# Patient Record
Sex: FEMALE | Race: WHITE | Employment: OTHER | URBAN - NONMETROPOLITAN AREA
[De-identification: names, ages, dates, MRNs, and addresses within clinical notes are randomized per-mention and may not be internally consistent; named-entity substitution may affect disease eponyms.]

---

## 2020-10-17 ENCOUNTER — HOSPITAL ENCOUNTER (OUTPATIENT)
Dept: LAB | Age: 85
Discharge: HOME | End: 2020-10-17
Payer: MEDICARE

## 2020-10-17 ENCOUNTER — TRANSCRIBE ORDER (OUTPATIENT)
Dept: REGISTRATION | Age: 85
End: 2020-10-17

## 2020-10-17 DIAGNOSIS — E53.8 BIOTIN-(PROPIONYL-COA-CARBOXYLASE) LIGASE DEFICIENCY: ICD-10-CM

## 2020-10-17 DIAGNOSIS — E03.9 ACQUIRED HYPOTHYROIDISM: ICD-10-CM

## 2020-10-17 DIAGNOSIS — D50.9 IRON DEFICIENCY ANEMIA, UNSPECIFIED: ICD-10-CM

## 2020-10-17 DIAGNOSIS — I10 ESSENTIAL HYPERTENSION, MALIGNANT: Primary | ICD-10-CM

## 2020-10-17 DIAGNOSIS — I10 ESSENTIAL HYPERTENSION, MALIGNANT: ICD-10-CM

## 2020-10-17 DIAGNOSIS — E55.9 VITAMIN D DEFICIENCY DISEASE: ICD-10-CM

## 2020-10-17 DIAGNOSIS — D64.9 ANEMIA, UNSPECIFIED: ICD-10-CM

## 2020-10-17 DIAGNOSIS — E78.00 PURE HYPERCHOLESTEROLEMIA: ICD-10-CM

## 2020-10-17 DIAGNOSIS — D52.9 FOLATE-DEFICIENCY ANEMIA: ICD-10-CM

## 2020-10-17 LAB
ALBUMIN SERPL-MCNC: 4.1 G/DL (ref 3.5–4.7)
ALBUMIN/GLOB SERPL: 1.3 {RATIO}
ALP SERPL-CCNC: 60 U/L (ref 38–126)
ALT SERPL-CCNC: 11 U/L (ref 3–52)
ANION GAP SERPL CALC-SCNC: 11 MMOL/L
AST SERPL W P-5'-P-CCNC: 17 U/L (ref 14–74)
BASOPHILS # BLD: 0 K/UL (ref 0–0.1)
BASOPHILS NFR BLD: 0 % (ref 0–2)
BILIRUB SERPL-MCNC: 0.8 MG/DL (ref 0.2–1)
BUN SERPL-MCNC: 15 MG/DL (ref 9–21)
BUN/CREAT SERPL: 17
CA-I BLD-MCNC: 9.7 MG/DL (ref 8.5–10.5)
CHLORIDE SERPL-SCNC: 96 MMOL/L (ref 94–111)
CO2 SERPL-SCNC: 27 MMOL/L (ref 21–33)
CREAT SERPL-MCNC: 0.9 MG/DL (ref 0.7–1.2)
EOSINOPHIL # BLD: 0.1 K/UL (ref 0–0.4)
EOSINOPHIL NFR BLD: 1 % (ref 0–5)
ERYTHROCYTE [DISTWIDTH] IN BLOOD BY AUTOMATED COUNT: 14.3 % (ref 11.6–14.5)
GLOBULIN SER CALC-MCNC: 3.1 G/DL
GLUCOSE SERPL-MCNC: 110 MG/DL (ref 70–110)
HCT VFR BLD AUTO: 42.2 % (ref 35–45)
HGB BLD-MCNC: 13.9 G/DL (ref 12–16)
IMM GRANULOCYTES # BLD AUTO: 0 K/UL
IMM GRANULOCYTES NFR BLD AUTO: 0 %
LYMPHOCYTES # BLD: 1.3 K/UL (ref 0.9–3.6)
LYMPHOCYTES NFR BLD: 18 % (ref 21–52)
MCH RBC QN AUTO: 28.6 PG (ref 24–34)
MCHC RBC AUTO-ENTMCNC: 32.9 G/DL (ref 31–37)
MCV RBC AUTO: 86.8 FL (ref 74–97)
MONOCYTES # BLD: 0.5 K/UL (ref 0.05–1.2)
MONOCYTES NFR BLD: 7 % (ref 3–10)
NEUTS SEG # BLD: 5.1 K/UL (ref 1.8–8)
NEUTS SEG NFR BLD: 74 % (ref 40–73)
PLATELET # BLD AUTO: 245 K/UL (ref 135–420)
PMV BLD AUTO: 10.5 FL (ref 9.2–11.8)
POTASSIUM SERPL-SCNC: 4.3 MMOL/L (ref 3.2–5.1)
PROT SERPL-MCNC: 7.2 G/DL (ref 6.1–8.4)
RBC # BLD AUTO: 4.86 M/UL (ref 4.2–5.3)
SODIUM SERPL-SCNC: 134 MMOL/L (ref 135–145)
TSH SERPL DL<=0.05 MIU/L-ACNC: 2.79 UIU/ML (ref 0.35–6.2)
WBC # BLD AUTO: 6.9 K/UL (ref 4.6–13.2)

## 2020-10-17 PROCEDURE — 82306 VITAMIN D 25 HYDROXY: CPT

## 2020-10-17 PROCEDURE — 36415 COLL VENOUS BLD VENIPUNCTURE: CPT

## 2020-10-17 PROCEDURE — 80053 COMPREHEN METABOLIC PANEL: CPT

## 2020-10-17 PROCEDURE — 82728 ASSAY OF FERRITIN: CPT

## 2020-10-17 PROCEDURE — 85025 COMPLETE CBC W/AUTO DIFF WBC: CPT

## 2020-10-17 PROCEDURE — 80061 LIPID PANEL: CPT

## 2020-10-17 PROCEDURE — 82607 VITAMIN B-12: CPT

## 2020-10-17 PROCEDURE — 84439 ASSAY OF FREE THYROXINE: CPT

## 2020-10-17 PROCEDURE — 84443 ASSAY THYROID STIM HORMONE: CPT

## 2020-10-18 LAB
25(OH)D3 SERPL-MCNC: 43.9 NG/ML (ref 30–100)
CHOLEST SERPL-MCNC: 142 MG/DL
FERRITIN SERPL-MCNC: 94 NG/ML (ref 8–252)
HDLC SERPL-MCNC: 66 MG/DL
HDLC SERPL: 2.2 {RATIO} (ref 0–5)
LDLC SERPL CALC-MCNC: 56.6 MG/DL (ref 0–100)
LIPID PROFILE,FLP: NORMAL
T4 FREE SERPL-MCNC: 1 NG/DL (ref 0.8–1.5)
TRIGL SERPL-MCNC: 97 MG/DL (ref ?–150)
VIT B12 SERPL-MCNC: >2000 PG/ML (ref 193–986)
VLDLC SERPL CALC-MCNC: 19.4 MG/DL

## 2021-04-06 ENCOUNTER — TRANSCRIBE ORDER (OUTPATIENT)
Dept: REGISTRATION | Age: 86
End: 2021-04-06

## 2021-04-06 ENCOUNTER — HOSPITAL ENCOUNTER (OUTPATIENT)
Dept: LAB | Age: 86
Discharge: HOME | End: 2021-04-06
Payer: MEDICARE

## 2021-04-06 DIAGNOSIS — D50.9 NORMOCYTIC HYPOCHROMIC ANEMIA: ICD-10-CM

## 2021-04-06 DIAGNOSIS — D52.9 DIHYDROFOLATE REDUCTASE DEFICIENCY: ICD-10-CM

## 2021-04-06 DIAGNOSIS — E78.00 HYPERCHOLESTERINEMIC XANTHOMATOSIS: ICD-10-CM

## 2021-04-06 DIAGNOSIS — D64.9 ABSOLUTE ANEMIA: ICD-10-CM

## 2021-04-06 DIAGNOSIS — N39.0 URINARY TRACT INFECTIOUS DISEASE: ICD-10-CM

## 2021-04-06 DIAGNOSIS — E03.9 ACQUIRED HYPOTHYROIDISM: ICD-10-CM

## 2021-04-06 DIAGNOSIS — I10 ESSENTIAL HYPERTENSION, BENIGN: Primary | ICD-10-CM

## 2021-04-06 DIAGNOSIS — I10 ESSENTIAL HYPERTENSION, BENIGN: ICD-10-CM

## 2021-04-06 LAB
ALBUMIN SERPL-MCNC: 4 G/DL (ref 3.5–4.7)
ALBUMIN/GLOB SERPL: 1.4 {RATIO}
ALP SERPL-CCNC: 57 U/L (ref 38–126)
ALT SERPL-CCNC: 18 U/L (ref 3–52)
ANION GAP SERPL CALC-SCNC: 9 MMOL/L
APPEARANCE UR: ABNORMAL
AST SERPL W P-5'-P-CCNC: 23 U/L (ref 14–74)
BACTERIA URNS QL MICRO: SLIGHT /HPF
BASOPHILS # BLD: 0 K/UL (ref 0–0.1)
BASOPHILS NFR BLD: 0 % (ref 0–2)
BILIRUB SERPL-MCNC: 1 MG/DL (ref 0.2–1)
BILIRUB UR QL: NEGATIVE
BUN SERPL-MCNC: 24 MG/DL (ref 9–21)
BUN/CREAT SERPL: 30
CA-I BLD-MCNC: 9.2 MG/DL (ref 8.5–10.5)
CHLORIDE SERPL-SCNC: 102 MMOL/L (ref 94–111)
CO2 SERPL-SCNC: 28 MMOL/L (ref 21–33)
COLOR UR: ABNORMAL
CREAT SERPL-MCNC: 0.8 MG/DL (ref 0.7–1.2)
EOSINOPHIL # BLD: 0.1 K/UL (ref 0–0.4)
EOSINOPHIL NFR BLD: 2 % (ref 0–5)
EPITH CASTS URNS QL MICRO: ABNORMAL /LPF (ref 0–20)
ERYTHROCYTE [DISTWIDTH] IN BLOOD BY AUTOMATED COUNT: 15.1 % (ref 11.6–14.5)
GLOBULIN SER CALC-MCNC: 2.8 G/DL
GLUCOSE SERPL-MCNC: 100 MG/DL (ref 70–110)
GLUCOSE UR STRIP.AUTO-MCNC: NEGATIVE MG/DL
HCT VFR BLD AUTO: 40.2 % (ref 35–45)
HGB BLD-MCNC: 12.5 G/DL (ref 12–16)
HGB UR QL STRIP: ABNORMAL
IMM GRANULOCYTES # BLD AUTO: 0 K/UL
IMM GRANULOCYTES NFR BLD AUTO: 0 %
KETONES UR QL STRIP.AUTO: NEGATIVE MG/DL
LEUKOCYTE ESTERASE UR QL STRIP.AUTO: ABNORMAL
LYMPHOCYTES # BLD: 1.4 K/UL (ref 0.9–3.6)
LYMPHOCYTES NFR BLD: 24 % (ref 21–52)
MCH RBC QN AUTO: 28.2 PG (ref 24–34)
MCHC RBC AUTO-ENTMCNC: 31.1 G/DL (ref 31–37)
MCV RBC AUTO: 90.7 FL (ref 74–97)
MONOCYTES # BLD: 0.4 K/UL (ref 0.05–1.2)
MONOCYTES NFR BLD: 7 % (ref 3–10)
NEUTS SEG # BLD: 3.9 K/UL (ref 1.8–8)
NEUTS SEG NFR BLD: 67 % (ref 40–73)
NITRITE UR QL STRIP.AUTO: NEGATIVE
PH UR STRIP: 5 [PH] (ref 5–9)
PLATELET # BLD AUTO: 185 K/UL (ref 135–420)
PMV BLD AUTO: 10.7 FL (ref 9.2–11.8)
POTASSIUM SERPL-SCNC: 3.7 MMOL/L (ref 3.2–5.1)
PROT SERPL-MCNC: 6.8 G/DL (ref 6.1–8.4)
PROT UR STRIP-MCNC: 15 MG/DL
RBC # BLD AUTO: 4.43 M/UL (ref 4.2–5.3)
RBC #/AREA URNS HPF: ABNORMAL /HPF (ref 0–2)
SODIUM SERPL-SCNC: 139 MMOL/L (ref 135–145)
SP GR UR REFRACTOMETRY: 1.01 (ref 1–1.03)
TSH SERPL DL<=0.05 MIU/L-ACNC: 2.29 UIU/ML (ref 0.35–6.2)
UROBILINOGEN UR QL STRIP.AUTO: 0.2 EU/DL (ref 0.2–1)
WBC # BLD AUTO: 5.8 K/UL (ref 4.6–13.2)
WBC URNS QL MICRO: >100 /HPF (ref 0–4)

## 2021-04-06 PROCEDURE — 84443 ASSAY THYROID STIM HORMONE: CPT

## 2021-04-06 PROCEDURE — 87077 CULTURE AEROBIC IDENTIFY: CPT

## 2021-04-06 PROCEDURE — 82728 ASSAY OF FERRITIN: CPT

## 2021-04-06 PROCEDURE — 87186 SC STD MICRODIL/AGAR DIL: CPT

## 2021-04-06 PROCEDURE — 36415 COLL VENOUS BLD VENIPUNCTURE: CPT

## 2021-04-06 PROCEDURE — 80053 COMPREHEN METABOLIC PANEL: CPT

## 2021-04-06 PROCEDURE — 81001 URINALYSIS AUTO W/SCOPE: CPT

## 2021-04-06 PROCEDURE — 84439 ASSAY OF FREE THYROXINE: CPT

## 2021-04-06 PROCEDURE — 82746 ASSAY OF FOLIC ACID SERUM: CPT

## 2021-04-06 PROCEDURE — 87086 URINE CULTURE/COLONY COUNT: CPT

## 2021-04-06 PROCEDURE — 80061 LIPID PANEL: CPT

## 2021-04-06 PROCEDURE — 85025 COMPLETE CBC W/AUTO DIFF WBC: CPT

## 2021-04-07 LAB
CHOLEST SERPL-MCNC: 137 MG/DL
FERRITIN SERPL-MCNC: 65 NG/ML (ref 26–388)
FOLATE SERPL-MCNC: 11.9 NG/ML (ref 5–21)
HDLC SERPL-MCNC: 71 MG/DL
HDLC SERPL: 1.9 {RATIO} (ref 0–5)
LDLC SERPL CALC-MCNC: 52.8 MG/DL (ref 0–100)
LIPID PROFILE,FLP: NORMAL
T4 FREE SERPL-MCNC: 0.8 NG/DL (ref 0.8–1.5)
TRIGL SERPL-MCNC: 66 MG/DL (ref ?–150)
VLDLC SERPL CALC-MCNC: 13.2 MG/DL

## 2021-04-09 LAB
BACTERIA SPEC CULT: ABNORMAL
COLONY COUNT,CNT: ABNORMAL
SPECIAL REQUESTS,SREQ: ABNORMAL

## 2021-08-27 ENCOUNTER — TRANSCRIBE ORDER (OUTPATIENT)
Dept: SCHEDULING | Age: 86
End: 2021-08-27

## 2021-08-27 DIAGNOSIS — N39.0 RECURRENT UTI: Primary | ICD-10-CM

## 2021-09-03 ENCOUNTER — HOSPITAL ENCOUNTER (OUTPATIENT)
Dept: ULTRASOUND IMAGING | Age: 86
Discharge: HOME | End: 2021-09-03
Payer: MEDICARE

## 2021-09-03 DIAGNOSIS — N39.0 RECURRENT UTI: ICD-10-CM

## 2021-09-03 PROCEDURE — 76770 US EXAM ABDO BACK WALL COMP: CPT

## 2021-10-03 ENCOUNTER — APPOINTMENT (OUTPATIENT)
Dept: CT IMAGING | Age: 86
End: 2021-10-03
Attending: EMERGENCY MEDICINE
Payer: MEDICARE

## 2021-10-03 ENCOUNTER — HOSPITAL ENCOUNTER (EMERGENCY)
Age: 86
Discharge: HOME | End: 2021-10-03
Attending: EMERGENCY MEDICINE
Payer: MEDICARE

## 2021-10-03 VITALS
SYSTOLIC BLOOD PRESSURE: 157 MMHG | HEIGHT: 69 IN | RESPIRATION RATE: 18 BRPM | HEART RATE: 81 BPM | TEMPERATURE: 97.9 F | OXYGEN SATURATION: 98 % | WEIGHT: 133 LBS | BODY MASS INDEX: 19.7 KG/M2 | DIASTOLIC BLOOD PRESSURE: 75 MMHG

## 2021-10-03 DIAGNOSIS — E87.1 HYPONATREMIA: ICD-10-CM
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
## 2021-10-03 DIAGNOSIS — S41.111A SKIN TEAR OF RIGHT UPPER ARM WITHOUT COMPLICATION, INITIAL ENCOUNTER: ICD-10-CM

## 2021-10-03 PROBLEM — J45.909 ASTHMA: Status: ACTIVE | Noted: 2021-10-03

## 2021-10-03 PROBLEM — R69 ILL-DEFINED CONDITION: Status: ACTIVE | Noted: 2021-10-03

## 2021-10-03 PROBLEM — F03.90 DEMENTIA (HCC): Status: ACTIVE | Noted: 2021-10-03

## 2021-10-03 PROBLEM — I10 HYPERTENSION: Status: ACTIVE | Noted: 2021-10-03

## 2021-10-03 LAB
ANION GAP SERPL CALC-SCNC: 6 MMOL/L
APPEARANCE UR: CLEAR
BASOPHILS # BLD: 0.1 K/UL (ref 0–0.1)
BASOPHILS NFR BLD: 1 % (ref 0–2)
BILIRUB UR QL: NEGATIVE
BUN SERPL-MCNC: 22 MG/DL (ref 9–21)
BUN/CREAT SERPL: 31
CA-I BLD-MCNC: 8.9 MG/DL (ref 8.5–10.5)
CHLORIDE SERPL-SCNC: 95 MMOL/L (ref 94–111)
CO2 SERPL-SCNC: 29 MMOL/L (ref 21–33)
COLOR UR: YELLOW
CREAT SERPL-MCNC: 0.7 MG/DL (ref 0.7–1.2)
DIFFERENTIAL METHOD BLD: ABNORMAL
EOSINOPHIL # BLD: 0.1 K/UL (ref 0–0.4)
EOSINOPHIL NFR BLD: 1 % (ref 0–5)
ERYTHROCYTE [DISTWIDTH] IN BLOOD BY AUTOMATED COUNT: 15.4 % (ref 11.6–14.5)
GLUCOSE SERPL-MCNC: 98 MG/DL (ref 70–110)
GLUCOSE UR STRIP.AUTO-MCNC: NEGATIVE MG/DL
HCT VFR BLD AUTO: 38.1 % (ref 35–45)
HGB BLD-MCNC: 12.6 G/DL (ref 12–16)
HGB UR QL STRIP: NEGATIVE
IMM GRANULOCYTES # BLD AUTO: 0 K/UL (ref 0–0.04)
IMM GRANULOCYTES NFR BLD AUTO: 0 % (ref 0–0.5)
KETONES UR QL STRIP.AUTO: NEGATIVE MG/DL
LEUKOCYTE ESTERASE UR QL STRIP.AUTO: NEGATIVE
LYMPHOCYTES # BLD: 2.2 K/UL (ref 0.9–3.6)
LYMPHOCYTES NFR BLD: 26 % (ref 21–52)
MCH RBC QN AUTO: 29.5 PG (ref 24–34)
MCHC RBC AUTO-ENTMCNC: 33.1 G/DL (ref 31–37)
MCV RBC AUTO: 89.2 FL (ref 78–100)
MONOCYTES # BLD: 0.8 K/UL (ref 0.05–1.2)
MONOCYTES NFR BLD: 9 % (ref 3–10)
NEUTS SEG # BLD: 5.2 K/UL (ref 1.8–8)
NEUTS SEG NFR BLD: 63 % (ref 40–73)
NITRITE UR QL STRIP.AUTO: NEGATIVE
NRBC # BLD: 0 K/UL (ref 0–0.01)
NRBC BLD-RTO: 0 PER 100 WBC
PH UR STRIP: 6 [PH] (ref 5–8)
PLATELET # BLD AUTO: 183 K/UL (ref 135–420)
PMV BLD AUTO: 10.4 FL (ref 9.2–11.8)
POTASSIUM SERPL-SCNC: 4.5 MMOL/L (ref 3.2–5.1)
PROT UR STRIP-MCNC: NEGATIVE MG/DL
RBC # BLD AUTO: 4.27 M/UL (ref 4.2–5.3)
SODIUM SERPL-SCNC: 130 MMOL/L (ref 135–145)
SP GR UR REFRACTOMETRY: 1.01 (ref 1–1.03)
TROPONIN I SERPL-MCNC: <0.02 NG/ML (ref 0.02–0.05)
UROBILINOGEN UR QL STRIP.AUTO: 1 EU/DL (ref 0.2–1)
WBC # BLD AUTO: 8.3 K/UL (ref 4.6–13.2)

## 2021-10-03 PROCEDURE — 99285 EMERGENCY DEPT VISIT HI MDM: CPT

## 2021-10-03 PROCEDURE — 81003 URINALYSIS AUTO W/O SCOPE: CPT

## 2021-10-03 PROCEDURE — 84484 ASSAY OF TROPONIN QUANT: CPT

## 2021-10-03 PROCEDURE — 74011250636 HC RX REV CODE- 250/636: Performed by: EMERGENCY MEDICINE

## 2021-10-03 PROCEDURE — 90715 TDAP VACCINE 7 YRS/> IM: CPT | Performed by: EMERGENCY MEDICINE

## 2021-10-03 PROCEDURE — 70450 CT HEAD/BRAIN W/O DYE: CPT

## 2021-10-03 PROCEDURE — 93005 ELECTROCARDIOGRAM TRACING: CPT

## 2021-10-03 PROCEDURE — 90471 IMMUNIZATION ADMIN: CPT

## 2021-10-03 PROCEDURE — 85025 COMPLETE CBC W/AUTO DIFF WBC: CPT

## 2021-10-03 PROCEDURE — 80048 BASIC METABOLIC PNL TOTAL CA: CPT

## 2021-10-03 RX ORDER — ALLOPURINOL 300 MG/1
300 TABLET ORAL DAILY
COMMUNITY

## 2021-10-03 RX ORDER — CYANOCOBALAMIN 1000 UG/ML
1000 INJECTION, SOLUTION INTRAMUSCULAR; SUBCUTANEOUS ONCE
COMMUNITY
End: 2022-06-29

## 2021-10-03 RX ORDER — LANOLIN ALCOHOL/MO/W.PET/CERES
2000 CREAM (GRAM) TOPICAL DAILY
COMMUNITY

## 2021-10-03 RX ORDER — SODIUM CHLORIDE 9 MG/ML
125 INJECTION, SOLUTION INTRAVENOUS ONCE
Status: DISCONTINUED | OUTPATIENT
Start: 2021-10-03 | End: 2021-10-03

## 2021-10-03 RX ORDER — ALBUTEROL SULFATE 0.63 MG/3ML
0.63 SOLUTION RESPIRATORY (INHALATION)
COMMUNITY
End: 2022-04-24

## 2021-10-03 RX ORDER — MONTELUKAST SODIUM 10 MG/1
10 TABLET ORAL DAILY
COMMUNITY

## 2021-10-03 RX ORDER — CETIRIZINE HCL 10 MG
10 TABLET ORAL
COMMUNITY

## 2021-10-03 RX ORDER — LEVOTHYROXINE AND LIOTHYRONINE 38; 9 UG/1; UG/1
60 TABLET ORAL DAILY
COMMUNITY

## 2021-10-03 RX ORDER — FUROSEMIDE 20 MG/1
20 TABLET ORAL
COMMUNITY

## 2021-10-03 RX ORDER — MELATONIN
1000 DAILY
COMMUNITY

## 2021-10-03 RX ORDER — ALBUTEROL SULFATE 90 UG/1
AEROSOL, METERED RESPIRATORY (INHALATION)
COMMUNITY
End: 2022-04-24

## 2021-10-03 RX ADMIN — TETANUS TOXOID, REDUCED DIPHTHERIA TOXOID AND ACELLULAR PERTUSSIS VACCINE, ADSORBED 0.5 ML: 5; 2.5; 8; 8; 2.5 SUSPENSION INTRAMUSCULAR at 03:37

## 2021-10-03 NOTE — CONSULTS
Hospitalist Consultation Note    NAME:  Butch Sparks   :   1934   MRN:   [id number]     ATTENDING: No admitting provider for patient encounter. PCP:  Sagrario Boland MD    Date/Time:  10/3/2021 5:45 AM      Recommendations/Plan:       Ill-defined condition  -Follow-up with PCP  -May benefit from home therapy on outpatient basis    Hypertension  -Continue with current medication  -Review medication with PCP for any hypotensive issues    Dementia ([facility] [address]  -Follow-up with PCP    Asthma  -Continue with current medications                    Subjective:   REQUESTING PHYSICIAN: Dr Fabian Giordano:    Medical Evaluation and Neurological check  Karishma Moreno is a 80 y.o.  female who I was asked to see for possible admission    Evaluated patient in the emergency department with her daughter bedside. Caregiver reports that mother has been more challenging with her increasing dementia symptoms and frequent falls. They had someone to come into the home to help but have had difficulty with staffing this service. She was again brought into the emergency department today for a fall. Head CT was negative and she was neurologically intact. Review of the record was undertaken as well as a brief medical evaluation and neurologic check. Family are looking for options with respect to placement for 24-hour care.     A case management courtesy consult was placed to request assistance for the family should contact Thais De La Rosa at [phone]            Past Medical History:   Diagnosis Date    Arthropathy     Asthma     Atrial fibrillation ([facility] [address]     Community acquired pneumonia     Hypertension     Ill-defined condition     dementia    Thyroid disease       Past Surgical History:   Procedure Laterality Date    HX ORTHOPAEDIC      left ankle orif    HX TONSILLECTOMY       Social History     Tobacco Use    Smoking status: Never Smoker    Smokeless tobacco: Never Used   Substance Use Topics    Alcohol use: Never      Allergies   Allergen Reactions    Penicillin Other (comments)    Shrimp Extract Other (comments)      Prior to Admission medications    Medication Sig Start Date End Date Taking? Authorizing Provider   allopurinoL (ZYLOPRIM) 300 mg tablet Take 300 mg by mouth daily. Yes Bhavya, MD Jena   thyroid, Pork, ([place] Thyroid) 60 mg tablet Take 60 mg by mouth daily. Yes Bhavya, MD Jena   cyanocobalamin (Vitamin B-12) 1,000 mcg/mL injection 1,000 mcg by IntraMUSCular route once. Yes Bhavya, MD Jena   cyanocobalamin (Vitamin B-12) 1,000 mcg tablet Take 2,000 mcg by mouth daily. Yes Bhavya, MD Jena   montelukast (SINGULAIR) 10 mg tablet Take 10 mg by mouth daily. Yes Bhavya, MD Jena   cholecalciferol (Vitamin D3) (1000 Units /25 mcg) tablet Take 1,000 Units by mouth daily. Yes Bhavya, MD Jena   cetirizine (ZyrTEC) 10 mg tablet Take 10 mg by mouth nightly. Yes Bhavya, MD Jena   albuterol (Proventil HFA) 90 mcg/actuation inhaler Take  by inhalation every six (6) hours as needed for Wheezing. Yes Bhavya, MD Jena   albuterol (ACCUNEB) 0.63 mg/3 mL nebulizer solution 0.63 mg by Nebulization route four (4) times daily as needed for Wheezing. Yes Bhavya, MD Jena   furosemide (Lasix) 20 mg tablet Take 20 mg by mouth daily as needed. Yes Other, MD Jena   doxycycline (VIBRAMYCIN) 100 mg capsule Take 1 Capsule by mouth two (2) times a day. 10/1/21   Junior Turcios, NP   Symbicort 160-4.5 mcg/actuation HFAA  7/10/21   Provider, Historical   lisinopriL (PRINIVIL, ZESTRIL) 5 mg tablet  5/25/21   Provider, Historical   memantine (NAMENDA) 5 mg tablet TAKE 1 TABLET BY MOUTH EVERY DAY FOR 7 DAYS THEN 1 TABLET BY MOUTH TWICE DAILY 8/12/21   Provider, Historical   metoprolol tartrate (LOPRESSOR) 50 mg tablet 50 mg daily. Take 50mg in am and 25mg in pm 5/25/21   Provider, Historical   Xarelto 20 mg tab tablet 15 mg daily (with breakfast).  6/25/21   Provider, Historical   sertraline (ZOLOFT) 50 mg tablet Take 50 mg by mouth daily. 21   Provider, Historical   nitrofurantoin, macrocrystal-monohydrate, (MACROBID) 100 mg capsule Take 1 Capsule by mouth two (2) times a day. 21   Elise Salcido NP       REVIEW OF SYSTEMS:     Total of 12 systems reviewed as follows:        POSITIVE= underlined text  Negative = text not underlined  General:  fever, chills, sweats, generalized weakness, weight loss/gain,      loss of appetite, frequent falls and disorientation at home  Eyes:    blurred vision, eye pain, loss of vision, double vision  ENT:    rhinorrhea, pharyngitis   Respiratory:   cough, sputum production, SOB, wheezing, MCGILL, pleuritic pain   Cardiology:   chest pain, palpitations, orthopnea, PND, edema, syncope   Gastrointestinal:  abdominal pain , N/V, dysphagia, diarrhea, constipation, bleeding   Genitourinary:  frequency, urgency, dysuria, hematuria, incontinence   Muskuloskeletal :  arthralgia, myalgia   Hematology:  easy bruising, nose or gum bleeding, lymphadenopathy   Dermatological: rash, ulceration, pruritis   Endocrine:   hot flashes or polydipsia   Neurological:  headache, dizziness, confusion, focal weakness, paresthesia,     Speech difficulties, memory loss, gait disturbance, neuro exam form completed   Psychological: Obvious signs of dementia    Objective:   VITALS:    Visit Vitals  /65   Pulse 67   Temp 97.9 °F (36.6 °C)   Resp 16   Ht 5' 9\" (1.753 m)   Wt 60.3 kg (133 lb)   SpO2 99%   BMI 19.64 kg/m²     Temp (24hrs), Av.9 °F (36.6 °C), Min:97.9 °F (36.6 °C), Max:97.9 °F (36.6 °C)      PHYSICAL EXAM:   General:    Alert, cooperative, no distress, appears stated age. HEENT: Atraumatic, anicteric sclerae, pink conjunctivae     No oral ulcers, mucosa moist, throat clear  Neck:  Supple, symmetrical,  thyroid: non tender  Lungs:   Clear to auscultation bilaterally. No Wheezing or Rhonchi. No rales. Chest wall:  No tenderness  No Accessory muscle use.   Heart:   Regular rhythm,  No  murmur   No edema  Abdomen:   Soft, non-tender. Not distended. Bowel sounds normal  Extremities: No cyanosis. No clubbing  Skin:     Not pale. Not Jaundiced  No rashes   Psych:  Short-term memory loss and cognitive impairment. Not depressed. Not anxious or agitated. Neurologic: EOMs intact. No facial asymmetry. No aphasia or slurred speech. Symmetrical strength, Alert and oriented X 4. Comments    X Reviewed previous records   >50% of visit spent in counseling and coordination of care X Discussion with patient and/or family and questions answered       TOTAL TIME:     25 mins    LAB DATA REVIEWED:    Recent Results (from the past 24 hour(s))   CBC WITH AUTOMATED DIFF    Collection Time: 10/03/21  2:50 AM   Result Value Ref Range    WBC 8.3 4.6 - 13.2 K/uL    RBC 4.27 4.20 - 5.30 M/uL    HGB 12.6 12.0 - 16.0 g/dL    HCT 38.1 35.0 - 45.0 %    MCV 89.2 78.0 - 100.0 FL    MCH 29.5 24.0 - 34.0 PG    MCHC 33.1 31.0 - 37.0 g/dL    RDW 15.4 (H) 11.6 - 14.5 %    PLATELET 896 439 - 786 K/uL    MPV 10.4 9.2 - 11.8 FL    NRBC 0.0 0.0  WBC    ABSOLUTE NRBC 0.00 0.00 - 0.01 K/uL    NEUTROPHILS 63 40 - 73 %    LYMPHOCYTES 26 21 - 52 %    MONOCYTES 9 3 - 10 %    EOSINOPHILS 1 0 - 5 %    BASOPHILS 1 0 - 2 %    IMMATURE GRANULOCYTES 0 0 - 0.5 %    ABS. NEUTROPHILS 5.2 1.8 - 8.0 K/UL    ABS. LYMPHOCYTES 2.2 0.9 - 3.6 K/UL    ABS. MONOCYTES 0.8 0.05 - 1.2 K/UL    ABS. EOSINOPHILS 0.1 0.0 - 0.4 K/UL    ABS. BASOPHILS 0.1 0.0 - 0.1 K/UL    ABS. IMM.  GRANS. 0.0 0.00 - 0.04 K/UL    DF AUTOMATED     METABOLIC PANEL, BASIC    Collection Time: 10/03/21  2:50 AM   Result Value Ref Range    Sodium 130 (L) 135 - 145 mmol/L    Potassium 4.5 3.2 - 5.1 mmol/L    Chloride 95 94 - 111 mmol/L    CO2 29 21 - 33 mmol/L    Anion gap 6 mmol/L    Glucose 98 70 - 110 mg/dL    BUN 22 (H) 9 - 21 mg/dL    Creatinine 0.70 0.70 - 1.20 mg/dL    BUN/Creatinine ratio 31      GFR est AA >60 ml/min/1.73m2    GFR est non-AA >60 ml/min/1.73m2    Calcium 8.9 8.5 - 10.5 mg/dL   TROPONIN I    Collection Time: 10/03/21  2:50 AM   Result Value Ref Range    Troponin-I, Qt. <0.02 (L) 0.02 - 0.05 ng/mL   URINALYSIS W/ RFLX MICROSCOPIC    Collection Time: 10/03/21  4:00 AM   Result Value Ref Range    Color Yellow      Appearance Clear      Specific gravity 1.014 1.005 - 1.030      pH (UA) 6.0 5.0 - 8.0      Protein Negative Negative mg/dL    Glucose Negative Negative mg/dL    Ketone Negative Negative mg/dL    Bilirubin Negative Negative      Blood Negative Negative      Urobilinogen 1.0 0.2 - 1.0 EU/dL    Nitrites Negative Negative      Leukocyte Esterase Negative Negative         _____________________________  Hospitalist: Meet Rosas, PhD, PA-C

## 2021-10-03 NOTE — ED TRIAGE NOTES
Pt to ER with daughter who reports that patient has recurrent UTIs and has been treated and followed by [facility] for past several months. States that the urologist told them to come to the ER if patient had increased confusion. Daughter reports that the confusion is worse today and that pt has had multiple falls over the past week.  Pt noted to have multiple bruises and bandages in place

## 2021-10-03 NOTE — ED NOTES
Pt resting in bed with daughter at bedside. No c/o or acute distress noted. Pt is AAOx3, is able to move all 4 extremities with purpose, is able to carry on a coherent conversation.

## 2021-10-03 NOTE — ED PROVIDER NOTES
Pt brought in due to increasing confusion daughter, states h/o dementia but getting worse. Very forgetful. Seen at [facility] 4 days ago, placed on doxy for uti, neg head ct, has had numerous falls, fallen and hit head again since dc that night. C/o scrapes to rt arm also. Pt on xarelto for a fib. No palpitations. Pt denies any pain inc no neck or chest pain. Nl po intake. Daughter states pt lives alone and isn't safe to anymore. Unknown last tetanus shot. Past Medical History:   Diagnosis Date    Arthropathy     Asthma     [facility] fibrillation ([facility] [address]     Community acquired pneumonia     Hypertension     Ill-defined condition     dementia    Thyroid disease        Past Surgical History:   Procedure Laterality Date    HX ORTHOPAEDIC      left ankle orif    HX TONSILLECTOMY           History reviewed. No pertinent family history. Social History     Socioeconomic History    Marital status:      Spouse name: Not on file    Number of children: Not on file    Years of education: Not on file    Highest education level: Not on file   Occupational History    Not on file   Tobacco Use    Smoking status: Never Smoker    Smokeless tobacco: Never Used   Substance and Sexual Activity    Alcohol use: Never    Drug use: Never    Sexual activity: Not on file   Other Topics Concern    Not on file   Social History Narrative    Not on file     Social Determinants of Health     Financial Resource Strain:     Difficulty of Paying Living Expenses:    Food Insecurity:     Worried About Running Out of Food in the Last Year:     [address] in the Last Year:    Transportation Needs:     Lack of Transportation (Medical):      Lack of Transportation (Non-Medical):    Physical Activity:     Days of Exercise per Week:     Minutes of Exercise per Session:    Stress:     Feeling of Stress :    Social Connections:     Frequency of Communication with Friends and Family:     Frequency of Social Gatherings with Friends and Family:     Attends Faith Services:     Active Member of Clubs or Organizations:     Attends Club or Organization Meetings:     Marital Status:    Intimate Partner Violence:     Fear of Current or Ex-Partner:     Emotionally Abused:     Physically Abused:     Sexually Abused: ALLERGIES: Penicillin and Shrimp extract    Review of Systems   Constitutional: Negative for fever. HENT: Negative for congestion. Respiratory: Negative for cough and shortness of breath. Cardiovascular: Negative for chest pain. Gastrointestinal: Negative for abdominal pain and vomiting. Musculoskeletal: Negative for back pain. Skin: Positive for wound. Neurological: Negative for light-headedness. Psychiatric/Behavioral: Positive for confusion. All other systems reviewed and are negative. Vitals:    10/03/21 0246 10/03/21 0303   BP: (!) 109/56 135/65   Pulse: 67    Resp: 16    Temp: 97.9 °F (36.6 °C)    SpO2: 99%    Weight: 60.3 kg (133 lb)    Height: 5' 9\" (1.753 m)             Physical Exam  Vitals and nursing note reviewed. Constitutional:       Appearance: She is well-developed. She is not diaphoretic. HENT:      Head: Normocephalic and atraumatic. Eyes:      Pupils: Pupils are equal, round, and reactive to light. Cardiovascular:      Rate and Rhythm: Normal rate and regular rhythm. Heart sounds: No murmur heard. Pulmonary:      Effort: Pulmonary effort is normal.      Breath sounds: No wheezing. Abdominal:      Palpations: Abdomen is soft. Tenderness: There is no abdominal tenderness. Musculoskeletal:         General: No tenderness. Cervical back: Normal range of motion. Skin:     General: Skin is dry. Capillary Refill: Capillary refill takes less than 2 seconds. Findings: No rash.       Comments: + skin tear, healing, rt mid post upper arm   Neurological:      Mental Status: She is alert and oriented to person, place, and time.      Sensory: No sensory deficit. Motor: No weakness. Psychiatric:         Mood and Affect: Mood normal.          MDM       Procedures    Vitals:  Patient Vitals for the past 12 hrs:   Temp Pulse Resp BP SpO2   10/03/21 0303 -- -- -- 135/65 --   10/03/21 0246 97.9 °F (36.6 °C) 67 16 (!) 109/56 99 %         Medications ordered:   Medications   diph,Pertuss(AC),Tet Vac-PF (BOOSTRIX) suspension 0.5 mL (0.5 mL IntraMUSCular Given 10/3/21 0337)         Lab findings:  Recent Results (from the past 12 hour(s))   CBC WITH AUTOMATED DIFF    Collection Time: 10/03/21  2:50 AM   Result Value Ref Range    WBC 8.3 4.6 - 13.2 K/uL    RBC 4.27 4.20 - 5.30 M/uL    HGB 12.6 12.0 - 16.0 g/dL    HCT 38.1 35.0 - 45.0 %    MCV 89.2 78.0 - 100.0 FL    MCH 29.5 24.0 - 34.0 PG    MCHC 33.1 31.0 - 37.0 g/dL    RDW 15.4 (H) 11.6 - 14.5 %    PLATELET 703 843 - 689 K/uL    MPV 10.4 9.2 - 11.8 FL    NRBC 0.0 0.0  WBC    ABSOLUTE NRBC 0.00 0.00 - 0.01 K/uL    NEUTROPHILS 63 40 - 73 %    LYMPHOCYTES 26 21 - 52 %    MONOCYTES 9 3 - 10 %    EOSINOPHILS 1 0 - 5 %    BASOPHILS 1 0 - 2 %    IMMATURE GRANULOCYTES 0 0 - 0.5 %    ABS. NEUTROPHILS 5.2 1.8 - 8.0 K/UL    ABS. LYMPHOCYTES 2.2 0.9 - 3.6 K/UL    ABS. MONOCYTES 0.8 0.05 - 1.2 K/UL    ABS. EOSINOPHILS 0.1 0.0 - 0.4 K/UL    ABS. BASOPHILS 0.1 0.0 - 0.1 K/UL    ABS. IMM.  GRANS. 0.0 0.00 - 0.04 K/UL    DF AUTOMATED     METABOLIC PANEL, BASIC    Collection Time: 10/03/21  2:50 AM   Result Value Ref Range    Sodium 130 (L) 135 - 145 mmol/L    Potassium 4.5 3.2 - 5.1 mmol/L    Chloride 95 94 - 111 mmol/L    CO2 29 21 - 33 mmol/L    Anion gap 6 mmol/L    Glucose 98 70 - 110 mg/dL    BUN 22 (H) 9 - 21 mg/dL    Creatinine 0.70 0.70 - 1.20 mg/dL    BUN/Creatinine ratio 31      GFR est AA >60 ml/min/1.73m2    GFR est non-AA >60 ml/min/1.73m2    Calcium 8.9 8.5 - 10.5 mg/dL   TROPONIN I    Collection Time: 10/03/21  2:50 AM   Result Value Ref Range    Troponin-I, Qt. <0.02 (L) 0.02 - 0.05 ng/mL   URINALYSIS W/ RFLX MICROSCOPIC    Collection Time: 10/03/21  4:00 AM   Result Value Ref Range    Color Yellow      Appearance Clear      Specific gravity 1.014 1.005 - 1.030      pH (UA) 6.0 5.0 - 8.0      Protein Negative Negative mg/dL    Glucose Negative Negative mg/dL    Ketone Negative Negative mg/dL    Bilirubin Negative Negative      Blood Negative Negative      Urobilinogen 1.0 0.2 - 1.0 EU/dL    Nitrites Negative Negative      Leukocyte Esterase Negative Negative             X-Ray, CT or other radiology findings or impressions:  CT HEAD WO CONT   Final Result      1. No acute intracranial abnormalities. Specifically, no hemorrhage, mass effect   or CT evident acute cortical infarction. 2. Generalized cerebral volume loss with sequelae of chronic microvascular   ischemia. Progress notes, Consult notes or additional Procedure notes:   5:43 AM family concerned re confusion, but curr a and o x 3, they state fluctuating worse pta. Labs w no remarkable abnl x mild hyponatremia. D/w PA Linda Munson, who eval pt and d/w family, but no medical admit criteria, declined admit, d/w family re this and need for fu for placement as outpt, family agrees w his plan, to dc per his d/w family. Urged to ret for any changes. Diagnosis:   1. Fall, initial encounter    2. Skin tear of right upper arm without complication, initial encounter    3.  Hyponatremia        Disposition: home    Follow-up Information     Follow up With Specialties Details Why Contact [facility] EMERGENCY DEPT Emergency Medicine Go to  As needed, If symptoms worsen [employer] [address]    Sagrario Boland MD Internal Medicine Call in 1 day  [address] [facility]  [phone]             Patient's Medications   Start Taking    No medications on file   Continue Taking    ALBUTEROL (ACCUNEB) 0.63 MG/3 ML NEBULIZER SOLUTION    0.63 mg by Nebulization route four (4) times daily as needed for Wheezing. ALBUTEROL (PROVENTIL HFA) 90 MCG/ACTUATION INHALER    Take  by inhalation every six (6) hours as needed for Wheezing. ALLOPURINOL (ZYLOPRIM) 300 MG TABLET    Take 300 mg by mouth daily. CETIRIZINE (ZYRTEC) 10 MG TABLET    Take 10 mg by mouth nightly. CHOLECALCIFEROL (VITAMIN D3) (1000 UNITS /25 MCG) TABLET    Take 1,000 Units by mouth daily. CYANOCOBALAMIN (VITAMIN B-12) 1,000 MCG TABLET    Take 2,000 mcg by mouth daily. CYANOCOBALAMIN (VITAMIN B-12) 1,000 MCG/ML INJECTION    1,000 mcg by IntraMUSCular route once. DOXYCYCLINE (VIBRAMYCIN) 100 MG CAPSULE    Take 1 Capsule by mouth two (2) times a day. FUROSEMIDE (LASIX) 20 MG TABLET    Take 20 mg by mouth daily as needed. LISINOPRIL (PRINIVIL, ZESTRIL) 5 MG TABLET        MEMANTINE (NAMENDA) 5 MG TABLET    TAKE 1 TABLET BY MOUTH EVERY DAY FOR 7 DAYS THEN 1 TABLET BY MOUTH TWICE DAILY    METOPROLOL TARTRATE (LOPRESSOR) 50 MG TABLET    50 mg daily. Take 50mg in am and 25mg in pm    MONTELUKAST (SINGULAIR) 10 MG TABLET    Take 10 mg by mouth daily. NITROFURANTOIN, MACROCRYSTAL-MONOHYDRATE, (MACROBID) 100 MG CAPSULE    Take 1 Capsule by mouth two (2) times a day. SERTRALINE (ZOLOFT) 50 MG TABLET    Take 50 mg by mouth daily. SYMBICORT 160-4.5 MCG/ACTUATION HFAA        THYROID, PORK, ([place]OUR THYROID) 60 MG TABLET    Take 60 mg by mouth daily. XARELTO 20 MG TAB TABLET    15 mg daily (with breakfast).    These Medications have changed    No medications on file   Stop Taking    No medications on file

## 2021-10-04 LAB
ATRIAL RATE: 0 BPM
CALCULATED R AXIS, ECG10: 24 DEGREES
CALCULATED T AXIS, ECG11: 51 DEGREES
DIAGNOSIS, 93000: NORMAL
P-R INTERVAL, ECG05: 36 MS
Q-T INTERVAL, ECG07: 394 MS
QRS DURATION, ECG06: 105 MS
QTC CALCULATION (BEZET), ECG08: 426 MS
VENTRICULAR RATE, ECG03: 70 BPM

## 2021-11-15 ENCOUNTER — HOSPITAL ENCOUNTER (OUTPATIENT)
Dept: LAB | Age: 86
Discharge: HOME | End: 2021-11-15
Payer: MEDICARE

## 2021-11-15 LAB
APPEARANCE UR: ABNORMAL
BACTERIA URNS QL MICRO: ABNORMAL /HPF
BILIRUB UR QL: NEGATIVE
COLOR UR: YELLOW
EPITH CASTS URNS QL MICRO: ABNORMAL /LPF (ref 0–20)
GLUCOSE UR STRIP.AUTO-MCNC: NEGATIVE MG/DL
HGB UR QL STRIP: ABNORMAL
KETONES UR QL STRIP.AUTO: NEGATIVE MG/DL
LEUKOCYTE ESTERASE UR QL STRIP.AUTO: ABNORMAL
NITRITE UR QL STRIP.AUTO: POSITIVE
PH UR STRIP: 6 [PH] (ref 5–8)
PROT UR STRIP-MCNC: NEGATIVE MG/DL
RBC #/AREA URNS HPF: ABNORMAL /HPF (ref 0–2)
SP GR UR REFRACTOMETRY: 1.02 (ref 1–1.03)
UROBILINOGEN UR QL STRIP.AUTO: 1 EU/DL (ref 0.2–1)
WBC URNS QL MICRO: >100 /HPF (ref 0–4)

## 2021-11-15 PROCEDURE — 87077 CULTURE AEROBIC IDENTIFY: CPT

## 2021-11-15 PROCEDURE — 87086 URINE CULTURE/COLONY COUNT: CPT

## 2021-11-15 PROCEDURE — 87186 SC STD MICRODIL/AGAR DIL: CPT

## 2021-11-15 PROCEDURE — 81001 URINALYSIS AUTO W/SCOPE: CPT

## 2021-11-17 LAB
BACTERIA SPEC CULT: ABNORMAL
COLONY COUNT,CNT: ABNORMAL
SPECIAL REQUESTS,SREQ: ABNORMAL

## 2022-02-03 ENCOUNTER — HOSPITAL ENCOUNTER (OUTPATIENT)
Dept: LAB | Age: 87
Discharge: HOME | End: 2022-02-03
Payer: MEDICARE

## 2022-02-03 LAB
APPEARANCE UR: CLEAR
BACTERIA URNS QL MICRO: ABNORMAL /HPF
BILIRUB UR QL: NEGATIVE
COLOR UR: YELLOW
EPITH CASTS URNS QL MICRO: ABNORMAL /LPF (ref 0–20)
GLUCOSE UR STRIP.AUTO-MCNC: NEGATIVE MG/DL
HGB UR QL STRIP: NEGATIVE
KETONES UR QL STRIP.AUTO: NEGATIVE MG/DL
LEUKOCYTE ESTERASE UR QL STRIP.AUTO: NEGATIVE
NITRITE UR QL STRIP.AUTO: NEGATIVE
PH UR STRIP: 5 [PH] (ref 5–8)
PROT UR STRIP-MCNC: NEGATIVE MG/DL
RBC #/AREA URNS HPF: ABNORMAL /HPF (ref 0–2)
SP GR UR REFRACTOMETRY: 1.02 (ref 1–1.03)
UROBILINOGEN UR QL STRIP.AUTO: 0.2 EU/DL (ref 0.2–1)
WBC URNS QL MICRO: ABNORMAL /HPF (ref 0–4)

## 2022-02-03 PROCEDURE — 87086 URINE CULTURE/COLONY COUNT: CPT

## 2022-02-03 PROCEDURE — 81001 URINALYSIS AUTO W/SCOPE: CPT

## 2022-02-04 LAB
BACTERIA SPEC CULT: NORMAL
SPECIAL REQUESTS,SREQ: NORMAL

## 2022-03-18 PROBLEM — J45.909 ASTHMA: Status: ACTIVE | Noted: 2021-10-03

## 2022-03-18 PROBLEM — F03.90 DEMENTIA (HCC): Status: ACTIVE | Noted: 2021-10-03

## 2022-03-19 PROBLEM — R69 ILL-DEFINED CONDITION: Status: ACTIVE | Noted: 2021-10-03

## 2022-03-19 PROBLEM — I10 HYPERTENSION: Status: ACTIVE | Noted: 2021-10-03

## 2022-04-24 ENCOUNTER — APPOINTMENT (OUTPATIENT)
Dept: GENERAL RADIOLOGY | Age: 87
End: 2022-04-24
Attending: EMERGENCY MEDICINE
Payer: MEDICARE

## 2022-04-24 ENCOUNTER — HOSPITAL ENCOUNTER (EMERGENCY)
Age: 87
Discharge: HOME | End: 2022-04-24
Attending: EMERGENCY MEDICINE
Payer: MEDICARE

## 2022-04-24 VITALS
RESPIRATION RATE: 18 BRPM | DIASTOLIC BLOOD PRESSURE: 60 MMHG | OXYGEN SATURATION: 98 % | TEMPERATURE: 98.9 F | BODY MASS INDEX: 20.67 KG/M2 | HEART RATE: 74 BPM | SYSTOLIC BLOOD PRESSURE: 125 MMHG | WEIGHT: 140 LBS

## 2022-04-24 DIAGNOSIS — J20.9 ACUTE BRONCHITIS, UNSPECIFIED ORGANISM: ICD-10-CM

## 2022-04-24 DIAGNOSIS — J45.901 PERSISTENT ASTHMA WITH ACUTE EXACERBATION, UNSPECIFIED ASTHMA SEVERITY: Primary | ICD-10-CM

## 2022-04-24 LAB
ANION GAP SERPL CALC-SCNC: 10 MMOL/L
BASOPHILS # BLD: 0 K/UL (ref 0–0.1)
BASOPHILS NFR BLD: 0 % (ref 0–2)
BNP SERPL-MCNC: 746 PG/ML (ref 0–100)
BUN SERPL-MCNC: 23 MG/DL (ref 9–21)
BUN/CREAT SERPL: 23
CA-I BLD-MCNC: 8.9 MG/DL (ref 8.5–10.5)
CHLORIDE SERPL-SCNC: 96 MMOL/L (ref 94–111)
CO2 SERPL-SCNC: 26 MMOL/L (ref 21–33)
CREAT SERPL-MCNC: 1 MG/DL (ref 0.7–1.2)
DIFFERENTIAL METHOD BLD: ABNORMAL
EOSINOPHIL # BLD: 0 K/UL (ref 0–0.4)
EOSINOPHIL NFR BLD: 0 % (ref 0–5)
ERYTHROCYTE [DISTWIDTH] IN BLOOD BY AUTOMATED COUNT: 14.7 % (ref 11.6–14.5)
GLUCOSE SERPL-MCNC: 162 MG/DL (ref 70–110)
HCT VFR BLD AUTO: 37.3 % (ref 35–45)
HGB BLD-MCNC: 12 G/DL (ref 12–16)
IMM GRANULOCYTES # BLD AUTO: 0 K/UL (ref 0–0.04)
IMM GRANULOCYTES NFR BLD AUTO: 0 % (ref 0–0.5)
LYMPHOCYTES # BLD: 1.3 K/UL (ref 0.9–3.6)
LYMPHOCYTES NFR BLD: 15 % (ref 21–52)
MCH RBC QN AUTO: 29.6 PG (ref 24–34)
MCHC RBC AUTO-ENTMCNC: 32.2 G/DL (ref 31–37)
MCV RBC AUTO: 92.1 FL (ref 78–100)
MONOCYTES # BLD: 0.8 K/UL (ref 0.05–1.2)
MONOCYTES NFR BLD: 9 % (ref 3–10)
NEUTS SEG # BLD: 6.6 K/UL (ref 1.8–8)
NEUTS SEG NFR BLD: 76 % (ref 40–73)
NRBC # BLD: 0 K/UL (ref 0–0.01)
NRBC BLD-RTO: 0 PER 100 WBC
PLATELET # BLD AUTO: 149 K/UL (ref 135–420)
PMV BLD AUTO: 10.5 FL (ref 9.2–11.8)
POTASSIUM SERPL-SCNC: 3.9 MMOL/L (ref 3.2–5.1)
RBC # BLD AUTO: 4.05 M/UL (ref 4.2–5.3)
SODIUM SERPL-SCNC: 132 MMOL/L (ref 135–145)
TROPONIN I SERPL-MCNC: 0.02 NG/ML (ref 0.02–0.05)
WBC # BLD AUTO: 8.9 K/UL (ref 4.6–13.2)

## 2022-04-24 PROCEDURE — 71045 X-RAY EXAM CHEST 1 VIEW: CPT

## 2022-04-24 PROCEDURE — 74011000250 HC RX REV CODE- 250: Performed by: EMERGENCY MEDICINE

## 2022-04-24 PROCEDURE — 80048 BASIC METABOLIC PNL TOTAL CA: CPT

## 2022-04-24 PROCEDURE — 74011250637 HC RX REV CODE- 250/637: Performed by: EMERGENCY MEDICINE

## 2022-04-24 PROCEDURE — 96374 THER/PROPH/DIAG INJ IV PUSH: CPT

## 2022-04-24 PROCEDURE — 85025 COMPLETE CBC W/AUTO DIFF WBC: CPT

## 2022-04-24 PROCEDURE — 74011250636 HC RX REV CODE- 250/636: Performed by: EMERGENCY MEDICINE

## 2022-04-24 PROCEDURE — 99285 EMERGENCY DEPT VISIT HI MDM: CPT

## 2022-04-24 PROCEDURE — 94640 AIRWAY INHALATION TREATMENT: CPT

## 2022-04-24 PROCEDURE — 84484 ASSAY OF TROPONIN QUANT: CPT

## 2022-04-24 PROCEDURE — 83880 ASSAY OF NATRIURETIC PEPTIDE: CPT

## 2022-04-24 PROCEDURE — 96375 TX/PRO/DX INJ NEW DRUG ADDON: CPT

## 2022-04-24 PROCEDURE — 93005 ELECTROCARDIOGRAM TRACING: CPT

## 2022-04-24 RX ORDER — BENZONATATE 100 MG/1
100 CAPSULE ORAL
Status: COMPLETED | OUTPATIENT
Start: 2022-04-24 | End: 2022-04-24

## 2022-04-24 RX ORDER — FUROSEMIDE 40 MG/1
40 TABLET ORAL DAILY
Qty: 3 TABLET | Refills: 0 | Status: SHIPPED | OUTPATIENT
Start: 2022-04-24 | End: 2022-04-27

## 2022-04-24 RX ORDER — FUROSEMIDE 10 MG/ML
20 INJECTION INTRAMUSCULAR; INTRAVENOUS ONCE
Status: COMPLETED | OUTPATIENT
Start: 2022-04-24 | End: 2022-04-24

## 2022-04-24 RX ORDER — ALBUTEROL SULFATE 90 UG/1
1-2 AEROSOL, METERED RESPIRATORY (INHALATION)
Qty: 1 G | Refills: 0 | Status: SHIPPED | OUTPATIENT
Start: 2022-04-24

## 2022-04-24 RX ORDER — IPRATROPIUM BROMIDE AND ALBUTEROL SULFATE 2.5; .5 MG/3ML; MG/3ML
3 SOLUTION RESPIRATORY (INHALATION)
Status: COMPLETED | OUTPATIENT
Start: 2022-04-24 | End: 2022-04-24

## 2022-04-24 RX ORDER — AZITHROMYCIN 250 MG/1
250 TABLET, FILM COATED ORAL DAILY
Qty: 4 TABLET | Refills: 0 | Status: SHIPPED | OUTPATIENT
Start: 2022-04-25 | End: 2022-04-29

## 2022-04-24 RX ORDER — PREDNISONE 20 MG/1
60 TABLET ORAL DAILY
Qty: 12 TABLET | Refills: 0 | Status: SHIPPED | OUTPATIENT
Start: 2022-04-25 | End: 2022-04-29

## 2022-04-24 RX ORDER — AZITHROMYCIN 250 MG/1
500 TABLET, FILM COATED ORAL
Status: COMPLETED | OUTPATIENT
Start: 2022-04-24 | End: 2022-04-24

## 2022-04-24 RX ADMIN — METHYLPREDNISOLONE SODIUM SUCCINATE 125 MG: 125 INJECTION, POWDER, FOR SOLUTION INTRAMUSCULAR; INTRAVENOUS at 15:23

## 2022-04-24 RX ADMIN — FUROSEMIDE 20 MG: 10 INJECTION, SOLUTION INTRAMUSCULAR; INTRAVENOUS at 16:50

## 2022-04-24 RX ADMIN — BENZONATATE 100 MG: 100 CAPSULE ORAL at 16:50

## 2022-04-24 RX ADMIN — IPRATROPIUM BROMIDE AND ALBUTEROL SULFATE 3 ML: .5; 2.5 SOLUTION RESPIRATORY (INHALATION) at 15:24

## 2022-04-24 RX ADMIN — AZITHROMYCIN 500 MG: 250 TABLET, FILM COATED ORAL at 16:50

## 2022-04-24 RX ADMIN — IPRATROPIUM BROMIDE AND ALBUTEROL SULFATE 3 ML: .5; 2.5 SOLUTION RESPIRATORY (INHALATION) at 17:31

## 2022-04-24 NOTE — ED PROVIDER NOTES
Pt w h/o mild dementia[place], h/o asthma, has wheezing w cough x 2-3 days. Yellow sputum. No fever. No new confusioon. No leg pain or swelling. Mild body aches, diffuse. No chest pain. no abd pain. Given duoneb today, helped some. Pt on xarelto for h/o a fib. Past Medical History:   Diagnosis Date    Arthropathy     Asthma     Atrial fibrillation ([address]     Community acquired pneumonia     Hypertension     Ill-defined condition     dementia    Thyroid disease        Past Surgical History:   Procedure Laterality Date    HX ORTHOPAEDIC      left ankle orif    HX TONSILLECTOMY           History reviewed. No pertinent family history. Social History     Socioeconomic History    Marital status:      Spouse name: Not on file    Number of children: Not on file    Years of education: Not on file    Highest education level: Not on file   Occupational History    Not on file   Tobacco Use    Smoking status: Never Smoker    Smokeless tobacco: Never Used   Substance and Sexual Activity    Alcohol use: Never    Drug use: Never    Sexual activity: Not on file   Other Topics Concern    Not on file   Social History Narrative    Not on file     Social Determinants of Health     Financial Resource Strain:     Difficulty of Paying Living Expenses: Not on file   Food Insecurity:     Worried About Running Out of Food in the Last Year: Not on file    Danica of Food in the Last Year: Not on file   Transportation Needs:     Lack of Transportation (Medical): Not on file    Lack of Transportation (Non-Medical):  Not on file   Physical Activity:     Days of Exercise per Week: Not on file    Minutes of Exercise per Session: Not on file   Stress:     Feeling of Stress : Not on file   Social Connections:     Frequency of Communication with Friends and Family: Not on file    Frequency of Social Gatherings with Friends and Family: Not on file    Attends Jewish Services: Not on file    Active Member of Clubs or Organizations: Not on file    Attends Club or Organization Meetings: Not on file    Marital Status: Not on file   Intimate Partner Violence:     Fear of Current or Ex-Partner: Not on file    Emotionally Abused: Not on file    Physically Abused: Not on file    Sexually Abused: Not on file   Housing Stability:     Unable to Pay for Housing in the Last Year: Not on file    Number of [place] in the Last Year: Not on file    Unstable Housing in the Last Year: Not on file         ALLERGIES: Penicillin and Shrimp extract    Review of Systems   Constitutional: Negative for fever. HENT: Negative for congestion. Respiratory: Positive for cough and wheezing. Cardiovascular: Negative for chest pain. Gastrointestinal: Negative for abdominal pain and vomiting. Musculoskeletal: Negative for back pain. Skin: Negative for rash. Neurological: Negative for light-headedness. All other systems reviewed and are negative. Vitals:    04/24/22 1500 04/24/22 1524   BP: 125/60    Pulse: 74    Resp: 18    Temp: 98.9 °F (37.2 °C)    SpO2: 96% 97%   Weight: 63.5 kg (140 lb)             Physical Exam  Vitals and nursing note reviewed. Constitutional:       Appearance: She is well-developed. She is not diaphoretic. HENT:      Head: Normocephalic and atraumatic. Eyes:      Pupils: Pupils are equal, round, and reactive to light. Cardiovascular:      Rate and Rhythm: Normal rate and regular rhythm. Heart sounds: No murmur heard. Pulmonary:      Effort: Pulmonary effort is normal.      Breath sounds: Wheezing (diffuse b/l ) present. Abdominal:      Palpations: Abdomen is soft. Tenderness: There is no abdominal tenderness. Musculoskeletal:         General: No tenderness. Cervical back: Normal range of motion. Skin:     General: Skin is dry. Capillary Refill: Capillary refill takes less than 2 seconds. Findings: No rash. Neurological:      Mental Status: She is alert and oriented to person, place, and time. Psychiatric:         Mood and Affect: Mood normal.          MDM       Procedures      Vitals:  Patient Vitals for the past 12 hrs:   Temp Pulse Resp BP SpO2   04/24/22 1524     97 %   04/24/22 1500 98.9 °F (37.2 °C) 74 18 125/60 96 %         Medications ordered:   Medications   albuterol-ipratropium (DUO-NEB) 2.5 MG-0.5 MG/3 ML (has no administration in time range)   methylPREDNISolone (PF) (Solu-MEDROL) injection 125 mg (125 mg IntraVENous Given 4/24/22 1523)   albuterol-ipratropium (DUO-NEB) 2.5 MG-0.5 MG/3 ML (3 mL Nebulization Given 4/24/22 1524)   azithromycin (ZITHROMAX) tablet 500 mg (500 mg Oral Given 4/24/22 1650)   furosemide (LASIX) injection 20 mg (20 mg IntraVENous Given 4/24/22 1650)   benzonatate (TESSALON) capsule 100 mg (100 mg Oral Given 4/24/22 1650)         Lab findings:  Recent Results (from the past 12 hour(s))   CBC WITH AUTOMATED DIFF    Collection Time: 04/24/22  3:18 PM   Result Value Ref Range    WBC 8.9 4.6 - 13.2 K/uL    RBC 4.05 (L) 4.20 - 5.30 M/uL    HGB 12.0 12.0 - 16.0 g/dL    HCT 37.3 35.0 - 45.0 %    MCV 92.1 78.0 - 100.0 FL    MCH 29.6 24.0 - 34.0 PG    MCHC 32.2 31.0 - 37.0 g/dL    RDW 14.7 (H) 11.6 - 14.5 %    PLATELET 453 129 - 861 K/uL    MPV 10.5 9.2 - 11.8 FL    NRBC 0.0 0.0  WBC    ABSOLUTE NRBC 0.00 0.00 - 0.01 K/uL    NEUTROPHILS 76 (H) 40 - 73 %    LYMPHOCYTES 15 (L) 21 - 52 %    MONOCYTES 9 3 - 10 %    EOSINOPHILS 0 0 - 5 %    BASOPHILS 0 0 - 2 %    IMMATURE GRANULOCYTES 0 0 - 0.5 %    ABS. NEUTROPHILS 6.6 1.8 - 8.0 K/UL    ABS. LYMPHOCYTES 1.3 0.9 - 3.6 K/UL    ABS. MONOCYTES 0.8 0.05 - 1.2 K/UL    ABS. EOSINOPHILS 0.0 0.0 - 0.4 K/UL    ABS. BASOPHILS 0.0 0.0 - 0.1 K/UL    ABS. IMM.  GRANS. 0.0 0.00 - 0.04 K/UL    DF AUTOMATED     METABOLIC PANEL, BASIC    Collection Time: 04/24/22  3:18 PM   Result Value Ref Range    Sodium 132 (L) 135 - 145 mmol/L    Potassium 3.9 3.2 - 5.1 mmol/L    Chloride 96 94 - 111 mmol/L    CO2 26 21 - 33 mmol/L    Anion gap 10 mmol/L    Glucose 162 (H) 70 - 110 mg/dL    BUN 23 (H) 9 - 21 mg/dL    Creatinine 1.00 0.70 - 1.20 mg/dL    BUN/Creatinine ratio 23      GFR est AA >60 ml/min/1.73m2    GFR est non-AA 52 ml/min/1.73m2    Calcium 8.9 8.5 - 10.5 mg/dL   TROPONIN I    Collection Time: 04/24/22  3:18 PM   Result Value Ref Range    Troponin-I, Qt. 0.02 0.02 - 0.05 ng/mL   BNP    Collection Time: 04/24/22  3:18 PM   Result Value Ref Range     (H) 0 - 100 pg/mL           X-Ray, CT or other radiology findings or impressions:  XR CHEST PORT   Final Result      Borderline cardiomegaly with coarse interstitial markings bilaterally which may   be chronic. No focal consolidation or pneumothorax. Progress notes, Consult notes or additional Procedure notes:     Ekg: rate 75, irreg irreg c/w a fib, qrs 60, qrs 99, no st/t changes  5:21 PM improved, dec cough, still no sob. Nl sats. Pt and family prefer dc now. To dc per discussion. No emc. Not c/w pe/ptx//hypoxia/sepsis/acs. Det ret inst given. Pt verb und and agrees w dc plan . Diagnosis:   1. Persistent asthma with acute exacerbation, unspecified asthma severity    2. Acute bronchitis, unspecified organism        Disposition: home    Follow-up Information     Follow up With Specialties Details Why Contact [facility] EMERGENCY DEPT Emergency Medicine Go to  As needed, If symptoms worsen [address] MD Internal Medicine   [address] [facility]  [phone]             Patient's Medications   Start Taking    ALBUTEROL (PROVENTIL HFA, VENTOLIN HFA, PROAIR HFA) 90 MCG/ACTUATION INHALER    Take 1-2 Puffs by inhalation every four (4) hours as needed for Wheezing. AZITHROMYCIN (ZITHROMAX Z-YULISA) 250 MG TABLET    Take 1 Tablet by mouth daily for 4 days.     FUROSEMIDE (LASIX) 40 MG TABLET    Take 1 Tablet by mouth daily for 3 days. PREDNISONE (DELTASONE) 20 MG TABLET    Take 60 mg by mouth daily for 4 days. Continue Taking    ALLOPURINOL (ZYLOPRIM) 300 MG TABLET    Take 300 mg by mouth daily. CETIRIZINE (ZYRTEC) 10 MG TABLET    Take 10 mg by mouth nightly. CHOLECALCIFEROL (VITAMIN D3) (1000 UNITS /25 MCG) TABLET    Take 1,000 Units by mouth daily. CYANOCOBALAMIN (VITAMIN B-12) 1,000 MCG TABLET    Take 2,000 mcg by mouth daily. CYANOCOBALAMIN (VITAMIN B-12) 1,000 MCG/ML INJECTION    1,000 mcg by IntraMUSCular route once. DOXYCYCLINE (VIBRAMYCIN) 100 MG CAPSULE    Take 1 Capsule by mouth two (2) times a day. FUROSEMIDE (LASIX) 20 MG TABLET    Take 20 mg by mouth daily as needed. LISINOPRIL (PRINIVIL, ZESTRIL) 5 MG TABLET        MEMANTINE (NAMENDA) 5 MG TABLET    TAKE 1 TABLET BY MOUTH EVERY DAY FOR 7 DAYS THEN 1 TABLET BY MOUTH TWICE DAILY    METOPROLOL TARTRATE (LOPRESSOR) 50 MG TABLET    50 mg daily. Take 50mg in am and 25mg in pm    MONTELUKAST (SINGULAIR) 10 MG TABLET    Take 10 mg by mouth daily. NITROFURANTOIN, MACROCRYSTAL-MONOHYDRATE, (MACROBID) 100 MG CAPSULE    Take 1 Capsule by mouth two (2) times a day. SERTRALINE (ZOLOFT) 50 MG TABLET    Take 50 mg by mouth daily. SYMBICORT 160-4.5 MCG/ACTUATION HFAA        THYROID, PORK, (ARMOUR THYROID) 60 MG TABLET    Take 60 mg by mouth daily. XARELTO 20 MG TAB TABLET    15 mg daily (with breakfast). These Medications have changed    No medications on file   Stop Taking    ALBUTEROL (ACCUNEB) 0.63 MG/3 ML NEBULIZER SOLUTION    0.63 mg by Nebulization route four (4) times daily as needed for Wheezing. ALBUTEROL (PROVENTIL HFA) 90 MCG/ACTUATION INHALER    Take  by inhalation every six (6) hours as needed for Wheezing.

## 2022-04-24 NOTE — DISCHARGE INSTRUCTIONS
Return for pain, fever not resolving with motrin or tylenol, shortness of breath, vomiting, decreased fluid intake, weakness, numbness, dizziness, or any change or concerns. Take the lasix 40 mg each day for 3 days as prescribed instead of your normal 20 mg dose.

## 2022-04-24 NOTE — ED TRIAGE NOTES
Pt family states she has had a cough for the past couple of days that has gotten worse. [place] and it was [place].

## 2022-04-25 LAB
ATRIAL RATE: 0 BPM
CALCULATED R AXIS, ECG10: 60 DEGREES
CALCULATED T AXIS, ECG11: 56 DEGREES
DIAGNOSIS, 93000: NORMAL
P-R INTERVAL, ECG05: 185 MS
Q-T INTERVAL, ECG07: 374 MS
QRS DURATION, ECG06: 99 MS
QTC CALCULATION (BEZET), ECG08: 418 MS
VENTRICULAR RATE, ECG03: 75 BPM

## 2022-05-11 ENCOUNTER — HOSPITAL ENCOUNTER (OUTPATIENT)
Dept: LAB | Age: 87
Discharge: HOME | End: 2022-05-11
Payer: MEDICARE

## 2022-05-11 ENCOUNTER — HOSPITAL ENCOUNTER (OUTPATIENT)
Dept: GENERAL RADIOLOGY | Age: 87
Discharge: HOME | End: 2022-05-11
Payer: MEDICARE

## 2022-05-11 ENCOUNTER — TRANSCRIBE ORDER (OUTPATIENT)
Dept: REGISTRATION | Age: 87
End: 2022-05-11

## 2022-05-11 DIAGNOSIS — D64.9 ANEMIA, UNSPECIFIED: ICD-10-CM

## 2022-05-11 DIAGNOSIS — I50.42 CHRONIC COMBINED SYSTOLIC AND DIASTOLIC HEART FAILURE (HCC): ICD-10-CM

## 2022-05-11 DIAGNOSIS — E03.9 MYXEDEMA HEART DISEASE: ICD-10-CM

## 2022-05-11 DIAGNOSIS — D52.9 FOLATE-DEFICIENCY ANEMIA: ICD-10-CM

## 2022-05-11 DIAGNOSIS — I51.9 MYXEDEMA HEART DISEASE: ICD-10-CM

## 2022-05-11 DIAGNOSIS — D50.9 IRON DEFICIENCY ANEMIA, UNSPECIFIED: ICD-10-CM

## 2022-05-11 DIAGNOSIS — E55.9 AVITAMINOSIS D: ICD-10-CM

## 2022-05-11 DIAGNOSIS — I51.9 MYXEDEMA HEART DISEASE: Primary | ICD-10-CM

## 2022-05-11 DIAGNOSIS — E53.8 BIOTIN-(PROPIONYL-COA-CARBOXYLASE) LIGASE DEFICIENCY: ICD-10-CM

## 2022-05-11 DIAGNOSIS — I50.42 CHRONIC COMBINED SYSTOLIC AND DIASTOLIC HEART FAILURE (HCC): Primary | ICD-10-CM

## 2022-05-11 DIAGNOSIS — E03.9 MYXEDEMA HEART DISEASE: Primary | ICD-10-CM

## 2022-05-11 LAB
25(OH)D3 SERPL-MCNC: 23.3 NG/ML (ref 30–100)
ALBUMIN SERPL-MCNC: 3.2 G/DL (ref 3.4–5)
ALBUMIN/GLOB SERPL: 0.9 {RATIO} (ref 0.8–1.7)
ALP SERPL-CCNC: 86 U/L (ref 45–117)
ALT SERPL-CCNC: 15 U/L (ref 13–56)
ANION GAP SERPL CALC-SCNC: 4 MMOL/L (ref 3–18)
AST SERPL W P-5'-P-CCNC: 17 U/L (ref 10–38)
BASOPHILS # BLD: 0 K/UL (ref 0–0.1)
BASOPHILS NFR BLD: 1 % (ref 0–2)
BILIRUB SERPL-MCNC: 0.6 MG/DL (ref 0.2–1)
BUN SERPL-MCNC: 26 MG/DL (ref 7–18)
BUN/CREAT SERPL: 34 (ref 12–20)
CA-I BLD-MCNC: 9 MG/DL (ref 8.5–10.1)
CHLORIDE SERPL-SCNC: 104 MMOL/L (ref 100–111)
CO2 SERPL-SCNC: 29 MMOL/L (ref 21–32)
CREAT SERPL-MCNC: 0.76 MG/DL (ref 0.6–1.3)
DIFFERENTIAL METHOD BLD: ABNORMAL
EOSINOPHIL # BLD: 0.1 K/UL (ref 0–0.4)
EOSINOPHIL NFR BLD: 2 % (ref 0–5)
ERYTHROCYTE [DISTWIDTH] IN BLOOD BY AUTOMATED COUNT: 15.5 % (ref 11.6–14.5)
FERRITIN SERPL-MCNC: 114 NG/ML (ref 8–388)
FOLATE SERPL-MCNC: 12.9 NG/ML (ref 3.1–17.5)
GLOBULIN SER CALC-MCNC: 3.5 G/DL (ref 2–4)
GLUCOSE SERPL-MCNC: 62 MG/DL (ref 74–99)
HCT VFR BLD AUTO: 37.4 % (ref 35–45)
HGB BLD-MCNC: 11.8 G/DL (ref 12–16)
IMM GRANULOCYTES # BLD AUTO: 0 K/UL (ref 0–0.04)
IMM GRANULOCYTES NFR BLD AUTO: 0 % (ref 0–0.5)
LYMPHOCYTES # BLD: 1.7 K/UL (ref 0.9–3.6)
LYMPHOCYTES NFR BLD: 24 % (ref 21–52)
MCH RBC QN AUTO: 28.5 PG (ref 24–34)
MCHC RBC AUTO-ENTMCNC: 31.6 G/DL (ref 31–37)
MCV RBC AUTO: 90.3 FL (ref 78–100)
MONOCYTES # BLD: 0.6 K/UL (ref 0.05–1.2)
MONOCYTES NFR BLD: 9 % (ref 3–10)
NEUTS SEG # BLD: 4.4 K/UL (ref 1.8–8)
NEUTS SEG NFR BLD: 64 % (ref 40–73)
NRBC # BLD: 0 K/UL (ref 0–0.01)
NRBC BLD-RTO: 0 PER 100 WBC
PLATELET # BLD AUTO: 208 K/UL (ref 135–420)
PMV BLD AUTO: 10.7 FL (ref 9.2–11.8)
POTASSIUM SERPL-SCNC: 4.3 MMOL/L (ref 3.5–5.5)
PROT SERPL-MCNC: 6.7 G/DL (ref 6.4–8.2)
RBC # BLD AUTO: 4.14 M/UL (ref 4.2–5.3)
SODIUM SERPL-SCNC: 137 MMOL/L (ref 136–145)
T4 FREE SERPL-MCNC: 0.8 NG/DL (ref 0.7–1.5)
TSH SERPL DL<=0.05 MIU/L-ACNC: 0.9 UIU/ML (ref 0.36–3.74)
VIT B12 SERPL-MCNC: 1555 PG/ML (ref 211–911)
WBC # BLD AUTO: 6.8 K/UL (ref 4.6–13.2)

## 2022-05-11 PROCEDURE — 82306 VITAMIN D 25 HYDROXY: CPT

## 2022-05-11 PROCEDURE — 84439 ASSAY OF FREE THYROXINE: CPT

## 2022-05-11 PROCEDURE — 82746 ASSAY OF FOLIC ACID SERUM: CPT

## 2022-05-11 PROCEDURE — 84443 ASSAY THYROID STIM HORMONE: CPT

## 2022-05-11 PROCEDURE — 85025 COMPLETE CBC W/AUTO DIFF WBC: CPT

## 2022-05-11 PROCEDURE — 82607 VITAMIN B-12: CPT

## 2022-05-11 PROCEDURE — 71046 X-RAY EXAM CHEST 2 VIEWS: CPT

## 2022-05-11 PROCEDURE — 80053 COMPREHEN METABOLIC PANEL: CPT

## 2022-05-11 PROCEDURE — 82728 ASSAY OF FERRITIN: CPT

## 2022-05-11 PROCEDURE — 36415 COLL VENOUS BLD VENIPUNCTURE: CPT

## 2022-06-22 ENCOUNTER — HOSPITAL ENCOUNTER (EMERGENCY)
Age: 87
Discharge: LEFT BEFORE DISCHARGE | End: 2022-06-22
Payer: MEDICARE

## 2022-06-22 PROCEDURE — 75810000275 HC EMERGENCY DEPT VISIT NO LEVEL OF CARE

## 2022-06-29 ENCOUNTER — APPOINTMENT (OUTPATIENT)
Dept: GENERAL RADIOLOGY | Age: 87
End: 2022-06-29
Attending: INTERNAL MEDICINE
Payer: MEDICARE

## 2022-06-29 ENCOUNTER — HOSPITAL ENCOUNTER (EMERGENCY)
Age: 87
Discharge: HOME | End: 2022-06-29
Attending: INTERNAL MEDICINE
Payer: MEDICARE

## 2022-06-29 VITALS
HEART RATE: 91 BPM | BODY MASS INDEX: 20.73 KG/M2 | WEIGHT: 140 LBS | OXYGEN SATURATION: 97 % | SYSTOLIC BLOOD PRESSURE: 142 MMHG | HEIGHT: 69 IN | TEMPERATURE: 97.7 F | RESPIRATION RATE: 18 BRPM | DIASTOLIC BLOOD PRESSURE: 73 MMHG

## 2022-06-29 DIAGNOSIS — J45.901 MILD ASTHMA WITH ACUTE EXACERBATION, UNSPECIFIED WHETHER PERSISTENT: Primary | ICD-10-CM

## 2022-06-29 LAB
APPEARANCE UR: CLEAR
BACTERIA URNS QL MICRO: NEGATIVE /HPF
BILIRUB UR QL: NEGATIVE
COLOR UR: YELLOW
EPITH CASTS URNS QL MICRO: ABNORMAL /LPF (ref 0–20)
GLUCOSE UR STRIP.AUTO-MCNC: NEGATIVE MG/DL
HGB UR QL STRIP: NEGATIVE
KETONES UR QL STRIP.AUTO: NEGATIVE MG/DL
LEUKOCYTE ESTERASE UR QL STRIP.AUTO: ABNORMAL
NITRITE UR QL STRIP.AUTO: NEGATIVE
PH UR STRIP: 6.5 [PH] (ref 5–8)
PROT UR STRIP-MCNC: NEGATIVE MG/DL
RBC #/AREA URNS HPF: ABNORMAL /HPF (ref 0–2)
SP GR UR REFRACTOMETRY: 1.01 (ref 1–1.03)
UROBILINOGEN UR QL STRIP.AUTO: 1 EU/DL (ref 0.2–1)
WBC URNS QL MICRO: ABNORMAL /HPF (ref 0–4)

## 2022-06-29 PROCEDURE — 74011250637 HC RX REV CODE- 250/637: Performed by: INTERNAL MEDICINE

## 2022-06-29 PROCEDURE — 94640 AIRWAY INHALATION TREATMENT: CPT

## 2022-06-29 PROCEDURE — 99284 EMERGENCY DEPT VISIT MOD MDM: CPT

## 2022-06-29 PROCEDURE — 81001 URINALYSIS AUTO W/SCOPE: CPT

## 2022-06-29 PROCEDURE — 74011000250 HC RX REV CODE- 250: Performed by: INTERNAL MEDICINE

## 2022-06-29 PROCEDURE — 71045 X-RAY EXAM CHEST 1 VIEW: CPT

## 2022-06-29 RX ORDER — IPRATROPIUM BROMIDE AND ALBUTEROL SULFATE 2.5; .5 MG/3ML; MG/3ML
3 SOLUTION RESPIRATORY (INHALATION)
Status: COMPLETED | OUTPATIENT
Start: 2022-06-29 | End: 2022-06-29

## 2022-06-29 RX ORDER — DEXAMETHASONE SODIUM PHOSPHATE 4 MG/ML
6 INJECTION, SOLUTION INTRA-ARTICULAR; INTRALESIONAL; INTRAMUSCULAR; INTRAVENOUS; SOFT TISSUE
Status: COMPLETED | OUTPATIENT
Start: 2022-06-29 | End: 2022-06-29

## 2022-06-29 RX ORDER — SAME BUTANEDISULFONATE/BETAINE 400-600 MG
250 POWDER IN PACKET (EA) ORAL 2 TIMES DAILY
COMMUNITY

## 2022-06-29 RX ADMIN — DEXAMETHASONE SODIUM PHOSPHATE 6 MG: 4 INJECTION, SOLUTION INTRAMUSCULAR; INTRAVENOUS at 14:37

## 2022-06-29 RX ADMIN — IPRATROPIUM BROMIDE AND ALBUTEROL SULFATE 3 ML: .5; 2.5 SOLUTION RESPIRATORY (INHALATION) at 12:55

## 2022-06-29 NOTE — ED TRIAGE NOTES
PER EMS pt. Told staff at [place] [id number] she was having trouble breathing. Pt. Was given inhaler this morning and still had wheezing around noon so she was given an nebulizer. Pt. Felt better after nebulizer but family wanted her scene in the ER to check.

## 2022-06-29 NOTE — ED NOTES
Pt. Continues to rest quietly with daughter at bedside. VSS. Pt. Continues to deny shortness of breath.

## 2022-06-29 NOTE — ED NOTES
Pt. discharged at this time via wheelchair with daughter. I have reviewed discharge instructions with the patient. The patient verbalized understanding.

## 2022-06-29 NOTE — ED PROVIDER NOTES
EMERGENCY DEPARTMENT HISTORY AND PHYSICAL EXAM      Date: 6/29/2022  Patient Name: Carmel Marsh      History of Presenting Illness     Chief Complaint   Patient presents with    Shortness of Breath       History Provided By: Patient    HPI: Carmel Marsh, 80 y.o. DNR female from the [facility] [[phone]] with a past medical history significant for HTN; asthma; gout; hypothyroid; Alzheimers Dementia;CHF; AFib on Xarelto presents to the ED with cc of asthma. Per [facility] note: Pt had inspiratory wheezing this morning and given albuterol inhaler at 9 AM.  Not effective and the patient continued to have shortness of breath with wheezing in the right lung. Some confusion as well (possibly UTI to). Seen in urgent care last week and diagnosed with sinusitis treated with azithromycin for 4 days which was completed. Albuterol nebulizer was given at 12 PM and the patient was sent to the emergency room. On arrival to the ER pt states that she feels much better from the neb given before she left the [facility]. Allergy: cipro; ketek; mobic; PCN; Sulfa. POA: Marga Raw: [phone] C: [phone]    There are no other complaints, changes, or physical findings at this time. PCP: Martha Moore MD    Current Outpatient Medications   Medication Sig Dispense Refill    Saccharomyces boulardii (Florastor) 250 mg capsule Take 250 mg by mouth two (2) times a day.  albuterol (PROVENTIL HFA, VENTOLIN HFA, PROAIR HFA) 90 mcg/actuation inhaler Take 1-2 Puffs by inhalation every four (4) hours as needed for Wheezing. 1 g 0    allopurinoL (ZYLOPRIM) 300 mg tablet Take 300 mg by mouth daily.  thyroid, Pork, ([place] Thyroid) 60 mg tablet Take 60 mg by mouth daily.  cyanocobalamin (Vitamin B-12) 1,000 mcg tablet Take 2,000 mcg by mouth daily.  montelukast (SINGULAIR) 10 mg tablet Take 10 mg by mouth daily.  cetirizine (ZyrTEC) 10 mg tablet Take 10 mg by mouth nightly.       lisinopriL (PRINIVIL, ZESTRIL) 5 mg tablet Take 5 mg by mouth daily.  memantine (NAMENDA) 5 mg tablet TAKE 1 TABLET BY MOUTH EVERY DAY FOR 7 DAYS THEN 1 TABLET BY MOUTH TWICE DAILY      metoprolol tartrate (LOPRESSOR) 50 mg tablet 50 mg daily. Take 50mg in am and 25mg in pm      Xarelto 20 mg tab tablet 15 mg daily (with breakfast).  sertraline (ZOLOFT) 50 mg tablet Take 50 mg by mouth daily.  cholecalciferol (Vitamin D3) (1000 Units /25 mcg) tablet Take 1,000 Units by mouth daily. (Patient not taking: Reported on 6/29/2022)      furosemide (Lasix) 20 mg tablet Take 20 mg by mouth daily as needed. (Patient not taking: Reported on 6/29/2022)      doxycycline (VIBRAMYCIN) 100 mg capsule Take 1 Capsule by mouth two (2) times a day. (Patient not taking: Reported on 6/29/2022) 14 Capsule 0    Symbicort 160-4.5 mcg/actuation HFAA       nitrofurantoin, macrocrystal-monohydrate, (MACROBID) 100 mg capsule Take 1 Capsule by mouth two (2) times a day. (Patient not taking: Reported on 6/29/2022) 10 Capsule 0       Past History     Past Medical History:  Past Medical History:   Diagnosis Date    Anxiety     Arthropathy     Asthma     Atrial fibrillation ([address]     [facility]) [facility]     Community acquired pneumonia     Dementia ([facility]     Gout     Hypertension     Ill-defined condition     dementia    Thyroid disease        Past Surgical History:  Past Surgical History:   Procedure Laterality Date    HX ORTHOPAEDIC      left ankle orif    HX TONSILLECTOMY         Family History:  History reviewed. No pertinent family history. Social History:  Social History     Tobacco Use    Smoking status: Never Smoker    Smokeless tobacco: Never Used   Substance Use Topics    Alcohol use: Never    Drug use: Never       Allergies:   Allergies   Allergen Reactions    Penicillin Other (comments)    Shrimp Extract Other (comments)         Review of Systems     Review of Systems   Constitutional: Negative for chills and fever. HENT: Negative for sore throat and trouble swallowing. Eyes: Negative for visual disturbance. Respiratory: Positive for shortness of breath and wheezing. Negative for cough and chest tightness. Cardiovascular: Negative for chest pain. Gastrointestinal: Negative for abdominal pain, diarrhea, nausea and vomiting. Genitourinary: Negative for dysuria and flank pain. Musculoskeletal: Negative for arthralgias and myalgias. Skin: Negative for rash. Neurological: Negative for headaches. Psychiatric/Behavioral: Negative for confusion. Physical Exam     Physical Exam  Vitals and nursing note reviewed. Constitutional:       General: She is not in acute distress. Appearance: She is well-developed. She is not ill-appearing or diaphoretic. HENT:      Head: Normocephalic. Cardiovascular:      Rate and Rhythm: Normal rate and regular rhythm. Pulses: Normal pulses. Heart sounds: Normal heart sounds. No murmur heard. Pulmonary:      Effort: Pulmonary effort is normal. No tachypnea, accessory muscle usage or respiratory distress. Breath sounds: Examination of the right-lower field reveals wheezing. Examination of the left-lower field reveals wheezing. Wheezing present. No decreased breath sounds, rhonchi or rales. Chest:      Chest wall: No tenderness. Abdominal:      General: Bowel sounds are normal.      Palpations: There is no hepatomegaly. Tenderness: There is no abdominal tenderness. Musculoskeletal:         General: Normal range of motion. Cervical back: Neck supple. Skin:     General: Skin is warm and dry. Neurological:      Mental Status: She is alert and oriented to person, place, and time.    Psychiatric:         Behavior: Behavior normal.         Lab and Diagnostic Study Results     Labs -     Recent Results (from the past 12 hour(s))   URINALYSIS W/ RFLX MICROSCOPIC    Collection Time: 06/29/22  2:51 PM   Result Value Ref Range Color Yellow      Appearance Clear      Specific gravity 1.013 1.005 - 1.030      pH (UA) 6.5 5.0 - 8.0      Protein Negative Negative mg/dL    Glucose Negative Negative mg/dL    Ketone Negative Negative mg/dL    Bilirubin Negative Negative      Blood Negative Negative      Urobilinogen 1.0 0.2 - 1.0 EU/dL    Nitrites Negative Negative      Leukocyte Esterase Trace (A) Negative     URINE MICROSCOPIC    Collection Time: 06/29/22  2:51 PM   Result Value Ref Range    WBC 0-4 0 - 4 /hpf    RBC 0-5 0 - 2 /hpf    Epithelial cells Moderate 0 - 20 /lpf    Bacteria Negative (A) None /hpf       Radiologic Studies -   [unfilled]  CT Results  (Last 48 hours)    None        CXR Results  (Last 48 hours)               06/29/22 1312  XR CHEST PORT Final result    Impression:      1. Stable exam. Otherwise, no acute process. Narrative:  EXAM: XR CHEST PORT       CLINICAL INDICATION/HISTORY: asthma   -Additional: None       COMPARISON: 5/11/2022, 4/24/2022       TECHNIQUE: Frontal view of the chest       _______________       FINDINGS:       HEART AND MEDIASTINUM: Stable midline cardiac silhouette, mediastinal and hilar   contours       LUNGS AND PLEURAL SPACES: Stable appearing chronic coarse bilateral interstitial   markings. No consolidation, pneumothorax or effusion. Peripheral right lung   calcified granuloma, as before. BONY THORAX AND SOFT TISSUES: No acute or destructive osseous abnormality. _______________                 Medical Decision Making and ED Course   - I am the first and primary provider for this patient AND AM THE PRIMARY PROVIDER OF RECORD. - I reviewed the vital signs, available nursing notes, past medical history, past surgical history, family history and social history. - Initial assessment performed. The patients presenting problems have been discussed, and the staff are in agreement with the care plan formulated and outlined with them.   I have encouraged them to ask questions as they arise throughout their visit. Vital Signs-Reviewed the patient's vital signs. Patient Vitals for the past 12 hrs:   Temp Pulse Resp BP SpO2   06/29/22 1607 -- 77 18 (!) 148/89 99 %   06/29/22 1518 -- 89 -- (!) 168/96 96 %   06/29/22 1442 -- 81 -- (!) 141/76 99 %   06/29/22 1347 -- 67 -- (!) 149/78 100 %   06/29/22 1336 -- -- -- -- 99 %   06/29/22 1249 97.7 °F (36.5 °C) 79 18 (!) 148/82 100 %           Records Reviewed: Nursing Notes  ED Course:   2:06 PM  Sounds and feels better after ER neb. Was going to DC pt but daughter here with her wants us to check her urine because she is sometimes confused and it may be from a UTI      Consultations:       Consultations:       Procedures and Critical Care           Disposition     Disposition: Discharge    Remove if not discharged  DISCHARGE PLAN:  1. Current Discharge Medication List      CONTINUE these medications which have NOT CHANGED    Details   Saccharomyces boulardii (Florastor) 250 mg capsule Take 250 mg by mouth two (2) times a day. albuterol (PROVENTIL HFA, VENTOLIN HFA, PROAIR HFA) 90 mcg/actuation inhaler Take 1-2 Puffs by inhalation every four (4) hours as needed for Wheezing. Qty: 1 g, Refills: 0      allopurinoL (ZYLOPRIM) 300 mg tablet Take 300 mg by mouth daily. thyroid, Pork, ([place] Thyroid) 60 mg tablet Take 60 mg by mouth daily. cyanocobalamin (Vitamin B-12) 1,000 mcg tablet Take 2,000 mcg by mouth daily. montelukast (SINGULAIR) 10 mg tablet Take 10 mg by mouth daily. cetirizine (ZyrTEC) 10 mg tablet Take 10 mg by mouth nightly. lisinopriL (PRINIVIL, ZESTRIL) 5 mg tablet Take 5 mg by mouth daily. memantine (NAMENDA) 5 mg tablet TAKE 1 TABLET BY MOUTH EVERY DAY FOR 7 DAYS THEN 1 TABLET BY MOUTH TWICE DAILY      metoprolol tartrate (LOPRESSOR) 50 mg tablet 50 mg daily. Take 50mg in am and 25mg in pm      Xarelto 20 mg tab tablet 15 mg daily (with breakfast).       sertraline (ZOLOFT) 50 mg tablet Take 50 mg by mouth daily. cholecalciferol (Vitamin D3) (1000 Units /25 mcg) tablet Take 1,000 Units by mouth daily. furosemide (Lasix) 20 mg tablet Take 20 mg by mouth daily as needed. doxycycline (VIBRAMYCIN) 100 mg capsule Take 1 Capsule by mouth two (2) times a day. Qty: 14 Capsule, Refills: 0      Symbicort 160-4.5 mcg/actuation HFAA       nitrofurantoin, macrocrystal-monohydrate, (MACROBID) 100 mg capsule Take 1 Capsule by mouth two (2) times a day. Qty: 10 Capsule, Refills: 0           2. Follow-up Information     Follow up With Specialties Details Why Contact Info    Albania Berumen MD Internal Medicine Physician Schedule an appointment as soon as possible for a visit in 1 week  [address] [place] [address]  [phone]          3. Return to ED if worse   4. Current Discharge Medication List          Diagnosis     Clinical Impression:   1. Mild asthma with acute exacerbation, unspecified whether persistent        Attestations:    Rios Castillo MD    Please note that this dictation was completed with [employer], the [employer] voice recognition software. Quite often unanticipated grammatical, syntax, homophones, and other interpretive errors are inadvertently transcribed by the computer software. Please disregard these errors. Please excuse any errors that have escaped final proofreading. Thank you.

## 2022-08-12 ENCOUNTER — HOSPITAL ENCOUNTER (OUTPATIENT)
Dept: LAB | Age: 87
Discharge: HOME | End: 2022-08-12
Payer: MEDICARE

## 2022-08-12 LAB
APPEARANCE UR: CLEAR
BACTERIA URNS QL MICRO: ABNORMAL /HPF
BILIRUB UR QL: NEGATIVE
COLOR UR: YELLOW
EPITH CASTS URNS QL MICRO: ABNORMAL /LPF (ref 0–20)
GLUCOSE UR STRIP.AUTO-MCNC: NEGATIVE MG/DL
HGB UR QL STRIP: NEGATIVE
KETONES UR QL STRIP.AUTO: NEGATIVE MG/DL
LEUKOCYTE ESTERASE UR QL STRIP.AUTO: ABNORMAL
NITRITE UR QL STRIP.AUTO: NEGATIVE
PH UR STRIP: 5.5 [PH] (ref 5–8)
PROT UR STRIP-MCNC: NEGATIVE MG/DL
RBC #/AREA URNS HPF: ABNORMAL /HPF (ref 0–2)
SP GR UR REFRACTOMETRY: 1.02 (ref 1–1.03)
UROBILINOGEN UR QL STRIP.AUTO: 0.2 EU/DL (ref 0.2–1)
WBC URNS QL MICRO: ABNORMAL /HPF (ref 0–4)

## 2022-08-12 PROCEDURE — 81001 URINALYSIS AUTO W/SCOPE: CPT

## 2022-08-12 PROCEDURE — 87086 URINE CULTURE/COLONY COUNT: CPT

## 2022-08-13 LAB
BACTERIA SPEC CULT: NORMAL
SPECIAL REQUESTS,SREQ: NORMAL

## 2022-12-14 ENCOUNTER — HOSPITAL ENCOUNTER (INPATIENT)
Age: 87
LOS: 2 days | Discharge: HOME | End: 2022-12-16
Attending: EMERGENCY MEDICINE | Admitting: INTERNAL MEDICINE
Payer: MEDICARE

## 2022-12-14 ENCOUNTER — APPOINTMENT (OUTPATIENT)
Dept: GENERAL RADIOLOGY | Age: 87
End: 2022-12-14
Attending: EMERGENCY MEDICINE
Payer: MEDICARE

## 2022-12-14 DIAGNOSIS — J45.41 MODERATE PERSISTENT ASTHMA WITH ACUTE EXACERBATION: ICD-10-CM

## 2022-12-14 DIAGNOSIS — I50.9 ACUTE ON CHRONIC CONGESTIVE HEART FAILURE, UNSPECIFIED HEART FAILURE TYPE (HCC): Primary | ICD-10-CM

## 2022-12-14 PROBLEM — J45.902 STATUS ASTHMATICUS WITH COPD (CHRONIC OBSTRUCTIVE PULMONARY DISEASE): Status: ACTIVE | Noted: 2022-12-14

## 2022-12-14 PROBLEM — J45.902 STATUS ASTHMATICUS WITH COPD (CHRONIC OBSTRUCTIVE PULMONARY DISEASE) (HCC): Status: ACTIVE | Noted: 2022-12-14

## 2022-12-14 PROBLEM — J44.9 STATUS ASTHMATICUS WITH COPD (CHRONIC OBSTRUCTIVE PULMONARY DISEASE) (HCC): Status: ACTIVE | Noted: 2022-12-14

## 2022-12-14 PROBLEM — J45.901 ASTHMA ATTACK: Status: ACTIVE | Noted: 2022-12-14

## 2022-12-14 PROBLEM — J44.89 STATUS ASTHMATICUS WITH COPD (CHRONIC OBSTRUCTIVE PULMONARY DISEASE): Status: ACTIVE | Noted: 2022-12-14

## 2022-12-14 LAB
ANION GAP SERPL CALC-SCNC: 8 MMOL/L (ref 3–18)
ATRIAL RATE: 124 BPM
BASOPHILS # BLD: 0 K/UL (ref 0–0.1)
BASOPHILS NFR BLD: 0 % (ref 0–2)
BNP SERPL-MCNC: 3511 PG/ML (ref 0–1800)
BUN SERPL-MCNC: 23 MG/DL (ref 7–18)
BUN/CREAT SERPL: 23 (ref 12–20)
CA-I BLD-MCNC: 8.7 MG/DL (ref 8.5–10.1)
CALCULATED R AXIS, ECG10: -52 DEGREES
CALCULATED T AXIS, ECG11: 78 DEGREES
CHLORIDE SERPL-SCNC: 97 MMOL/L (ref 100–111)
CO2 SERPL-SCNC: 29 MMOL/L (ref 21–32)
COVID-19 RAPID TEST, COVR: NOT DETECTED
CREAT SERPL-MCNC: 0.99 MG/DL (ref 0.6–1.3)
DIAGNOSIS, 93000: NORMAL
DIFFERENTIAL METHOD BLD: ABNORMAL
EOSINOPHIL # BLD: 0 K/UL (ref 0–0.4)
EOSINOPHIL NFR BLD: 0 % (ref 0–5)
ERYTHROCYTE [DISTWIDTH] IN BLOOD BY AUTOMATED COUNT: 14.5 % (ref 11.6–14.5)
FLUAV AG NPH QL IA: NEGATIVE
FLUBV AG NOSE QL IA: NEGATIVE
GLUCOSE SERPL-MCNC: 151 MG/DL (ref 74–99)
HCT VFR BLD AUTO: 37.8 % (ref 35–45)
HGB BLD-MCNC: 12.3 G/DL (ref 12–16)
IMM GRANULOCYTES # BLD AUTO: 0 K/UL
IMM GRANULOCYTES NFR BLD AUTO: 0 %
INR PPP: 2.4 (ref 0.8–1.2)
LYMPHOCYTES # BLD: 0.7 K/UL (ref 0.9–3.6)
LYMPHOCYTES NFR BLD: 13 % (ref 21–52)
MCH RBC QN AUTO: 29.3 PG (ref 24–34)
MCHC RBC AUTO-ENTMCNC: 32.5 G/DL (ref 31–37)
MCV RBC AUTO: 90 FL (ref 78–100)
MONOCYTES # BLD: 0.6 K/UL (ref 0.05–1.2)
MONOCYTES NFR BLD: 10 % (ref 3–10)
NEUTS SEG # BLD: 4.3 K/UL (ref 1.8–8)
NEUTS SEG NFR BLD: 76 % (ref 40–73)
NRBC # BLD: 0 K/UL (ref 0–0.01)
NRBC BLD-RTO: 0 PER 100 WBC
OTHER CELLS NFR BLD MANUAL: 1 %
P-R INTERVAL, ECG05: 148 MS
PLATELET # BLD AUTO: 165 K/UL (ref 135–420)
PMV BLD AUTO: 10.8 FL (ref 9.2–11.8)
POTASSIUM SERPL-SCNC: 4.2 MMOL/L (ref 3.5–5.5)
PROTHROMBIN TIME: 26.6 SEC (ref 11.5–15.2)
Q-T INTERVAL, ECG07: 386 MS
QRS DURATION, ECG06: 105 MS
QTC CALCULATION (BEZET), ECG08: 420 MS
RBC # BLD AUTO: 4.2 M/UL (ref 4.2–5.3)
RBC MORPH BLD: ABNORMAL
SODIUM SERPL-SCNC: 134 MMOL/L (ref 136–145)
TROPONIN-HIGH SENSITIVITY: 13 NG/L (ref 0–54)
VENTRICULAR RATE, ECG03: 71 BPM
WBC # BLD AUTO: 5.6 K/UL (ref 4.6–13.2)

## 2022-12-14 PROCEDURE — 96374 THER/PROPH/DIAG INJ IV PUSH: CPT

## 2022-12-14 PROCEDURE — 71045 X-RAY EXAM CHEST 1 VIEW: CPT

## 2022-12-14 PROCEDURE — 94640 AIRWAY INHALATION TREATMENT: CPT

## 2022-12-14 PROCEDURE — 87804 INFLUENZA ASSAY W/OPTIC: CPT

## 2022-12-14 PROCEDURE — 96375 TX/PRO/DX INJ NEW DRUG ADDON: CPT

## 2022-12-14 PROCEDURE — 74011250636 HC RX REV CODE- 250/636: Performed by: EMERGENCY MEDICINE

## 2022-12-14 PROCEDURE — 74011250637 HC RX REV CODE- 250/637: Performed by: NURSE PRACTITIONER

## 2022-12-14 PROCEDURE — 87635 SARS-COV-2 COVID-19 AMP PRB: CPT

## 2022-12-14 PROCEDURE — 94667 MNPJ CHEST WALL 1ST: CPT

## 2022-12-14 PROCEDURE — 85025 COMPLETE CBC W/AUTO DIFF WBC: CPT

## 2022-12-14 PROCEDURE — 83880 ASSAY OF NATRIURETIC PEPTIDE: CPT

## 2022-12-14 PROCEDURE — 85610 PROTHROMBIN TIME: CPT

## 2022-12-14 PROCEDURE — 93005 ELECTROCARDIOGRAM TRACING: CPT

## 2022-12-14 PROCEDURE — 94668 MNPJ CHEST WALL SBSQ: CPT

## 2022-12-14 PROCEDURE — 74011000250 HC RX REV CODE- 250: Performed by: EMERGENCY MEDICINE

## 2022-12-14 PROCEDURE — 65270000029 HC RM PRIVATE

## 2022-12-14 PROCEDURE — 74011250636 HC RX REV CODE- 250/636: Performed by: NURSE PRACTITIONER

## 2022-12-14 PROCEDURE — 74011000250 HC RX REV CODE- 250: Performed by: NURSE PRACTITIONER

## 2022-12-14 PROCEDURE — 84484 ASSAY OF TROPONIN QUANT: CPT

## 2022-12-14 PROCEDURE — 94644 CONT INHLJ TX 1ST HOUR: CPT

## 2022-12-14 PROCEDURE — 80048 BASIC METABOLIC PNL TOTAL CA: CPT

## 2022-12-14 PROCEDURE — 99285 EMERGENCY DEPT VISIT HI MDM: CPT

## 2022-12-14 PROCEDURE — 94664 DEMO&/EVAL PT USE INHALER: CPT

## 2022-12-14 RX ORDER — MONTELUKAST SODIUM 10 MG/1
10 TABLET ORAL DAILY
Status: DISCONTINUED | OUTPATIENT
Start: 2022-12-15 | End: 2022-12-16 | Stop reason: HOSPADM

## 2022-12-14 RX ORDER — LISINOPRIL 5 MG/1
5 TABLET ORAL DAILY
Status: DISCONTINUED | OUTPATIENT
Start: 2022-12-15 | End: 2022-12-16 | Stop reason: HOSPADM

## 2022-12-14 RX ORDER — ALLOPURINOL 300 MG/1
300 TABLET ORAL DAILY
Status: DISCONTINUED | OUTPATIENT
Start: 2022-12-15 | End: 2022-12-16 | Stop reason: HOSPADM

## 2022-12-14 RX ORDER — ALBUTEROL SULFATE 0.83 MG/ML
2.5 SOLUTION RESPIRATORY (INHALATION)
Status: DISCONTINUED | OUTPATIENT
Start: 2022-12-14 | End: 2022-12-16 | Stop reason: HOSPADM

## 2022-12-14 RX ORDER — ALBUTEROL SULFATE 0.83 MG/ML
5 SOLUTION RESPIRATORY (INHALATION)
Status: COMPLETED | OUTPATIENT
Start: 2022-12-14 | End: 2022-12-14

## 2022-12-14 RX ORDER — METOPROLOL TARTRATE 50 MG/1
50 TABLET ORAL DAILY
Status: DISCONTINUED | OUTPATIENT
Start: 2022-12-15 | End: 2022-12-16 | Stop reason: HOSPADM

## 2022-12-14 RX ORDER — ONDANSETRON 4 MG/1
4 TABLET, ORALLY DISINTEGRATING ORAL
Status: DISCONTINUED | OUTPATIENT
Start: 2022-12-14 | End: 2022-12-16 | Stop reason: HOSPADM

## 2022-12-14 RX ORDER — IPRATROPIUM BROMIDE AND ALBUTEROL SULFATE 2.5; .5 MG/3ML; MG/3ML
3 SOLUTION RESPIRATORY (INHALATION)
Status: DISCONTINUED | OUTPATIENT
Start: 2022-12-14 | End: 2022-12-14

## 2022-12-14 RX ORDER — SAME BUTANEDISULFONATE/BETAINE 400-600 MG
250 POWDER IN PACKET (EA) ORAL 2 TIMES DAILY
Status: DISCONTINUED | OUTPATIENT
Start: 2022-12-15 | End: 2022-12-15

## 2022-12-14 RX ORDER — ACETAMINOPHEN 325 MG/1
650 TABLET ORAL
Status: DISCONTINUED | OUTPATIENT
Start: 2022-12-14 | End: 2022-12-16 | Stop reason: HOSPADM

## 2022-12-14 RX ORDER — FUROSEMIDE 10 MG/ML
20 INJECTION INTRAMUSCULAR; INTRAVENOUS
Status: COMPLETED | OUTPATIENT
Start: 2022-12-14 | End: 2022-12-14

## 2022-12-14 RX ORDER — FUROSEMIDE 10 MG/ML
40 INJECTION INTRAMUSCULAR; INTRAVENOUS DAILY
Status: DISCONTINUED | OUTPATIENT
Start: 2022-12-15 | End: 2022-12-16 | Stop reason: HOSPADM

## 2022-12-14 RX ORDER — MEMANTINE HYDROCHLORIDE 5 MG/1
5 TABLET ORAL DAILY
Status: DISCONTINUED | OUTPATIENT
Start: 2022-12-15 | End: 2022-12-16 | Stop reason: HOSPADM

## 2022-12-14 RX ORDER — LANOLIN ALCOHOL/MO/W.PET/CERES
2000 CREAM (GRAM) TOPICAL DAILY
Status: DISCONTINUED | OUTPATIENT
Start: 2022-12-15 | End: 2022-12-16 | Stop reason: HOSPADM

## 2022-12-14 RX ORDER — SERTRALINE HYDROCHLORIDE 50 MG/1
50 TABLET, FILM COATED ORAL DAILY
Status: DISCONTINUED | OUTPATIENT
Start: 2022-12-15 | End: 2022-12-16 | Stop reason: HOSPADM

## 2022-12-14 RX ORDER — IPRATROPIUM BROMIDE AND ALBUTEROL SULFATE 2.5; .5 MG/3ML; MG/3ML
3 SOLUTION RESPIRATORY (INHALATION)
Status: DISCONTINUED | OUTPATIENT
Start: 2022-12-15 | End: 2022-12-16 | Stop reason: HOSPADM

## 2022-12-14 RX ORDER — IPRATROPIUM BROMIDE AND ALBUTEROL SULFATE 2.5; .5 MG/3ML; MG/3ML
3 SOLUTION RESPIRATORY (INHALATION)
Status: COMPLETED | OUTPATIENT
Start: 2022-12-14 | End: 2022-12-14

## 2022-12-14 RX ORDER — GUAIFENESIN 600 MG/1
600 TABLET, EXTENDED RELEASE ORAL EVERY 12 HOURS
Status: DISCONTINUED | OUTPATIENT
Start: 2022-12-14 | End: 2022-12-16 | Stop reason: HOSPADM

## 2022-12-14 RX ORDER — MAGNESIUM SULFATE 1 G/100ML
1 INJECTION INTRAVENOUS ONCE
Status: COMPLETED | OUTPATIENT
Start: 2022-12-14 | End: 2022-12-14

## 2022-12-14 RX ORDER — BENZONATATE 100 MG/1
100 CAPSULE ORAL
Status: DISCONTINUED | OUTPATIENT
Start: 2022-12-14 | End: 2022-12-16 | Stop reason: HOSPADM

## 2022-12-14 RX ORDER — LEVOTHYROXINE AND LIOTHYRONINE 19; 4.5 UG/1; UG/1
60 TABLET ORAL DAILY
Status: DISCONTINUED | OUTPATIENT
Start: 2022-12-15 | End: 2022-12-15

## 2022-12-14 RX ORDER — CETIRIZINE HYDROCHLORIDE 10 MG/1
10 TABLET ORAL
Status: DISCONTINUED | OUTPATIENT
Start: 2022-12-14 | End: 2022-12-16 | Stop reason: HOSPADM

## 2022-12-14 RX ADMIN — METHYLPREDNISOLONE SODIUM SUCCINATE 60 MG: 40 INJECTION, POWDER, FOR SOLUTION INTRAMUSCULAR; INTRAVENOUS at 20:48

## 2022-12-14 RX ADMIN — ALBUTEROL SULFATE 5 MG: 2.5 SOLUTION RESPIRATORY (INHALATION) at 15:51

## 2022-12-14 RX ADMIN — METHYLPREDNISOLONE SODIUM SUCCINATE 125 MG: 125 INJECTION, POWDER, FOR SOLUTION INTRAMUSCULAR; INTRAVENOUS at 14:48

## 2022-12-14 RX ADMIN — MAGNESIUM SULFATE HEPTAHYDRATE 1 G: 1 INJECTION, SOLUTION INTRAVENOUS at 17:55

## 2022-12-14 RX ADMIN — IPRATROPIUM BROMIDE AND ALBUTEROL SULFATE 3 ML: .5; 2.5 SOLUTION RESPIRATORY (INHALATION) at 23:18

## 2022-12-14 RX ADMIN — CETIRIZINE HYDROCHLORIDE 10 MG: 10 TABLET, FILM COATED ORAL at 21:20

## 2022-12-14 RX ADMIN — FUROSEMIDE 20 MG: 10 INJECTION, SOLUTION INTRAMUSCULAR; INTRAVENOUS at 15:58

## 2022-12-14 RX ADMIN — IPRATROPIUM BROMIDE AND ALBUTEROL SULFATE 3 ML: .5; 2.5 SOLUTION RESPIRATORY (INHALATION) at 15:02

## 2022-12-14 RX ADMIN — GUAIFENESIN 600 MG: 600 TABLET, EXTENDED RELEASE ORAL at 20:48

## 2022-12-14 RX ADMIN — ALBUTEROL SULFATE 5 MG: 2.5 SOLUTION RESPIRATORY (INHALATION) at 16:07

## 2022-12-14 RX ADMIN — ALBUTEROL SULFATE 5 MG: 2.5 SOLUTION RESPIRATORY (INHALATION) at 16:08

## 2022-12-14 NOTE — ED PROVIDER NOTES
EMERGENCY DEPARTMENT HISTORY AND PHYSICAL EXAM      Date: 12/14/2022  Patient Name: Margret Felder    History of Presenting Illness     Chief Complaint   Patient presents with    Wheezing    Shortness of Breath       History Provided By: Patient and Patient's Daughter    HPI: Margret Felder, 80 y.o. female arrives to us from memory care at the [facility] with complaint of cough and congestion for the past week brought in with wheezing, shortness of breath and difficulty breathing. Received a nebulizer at 10 AM at the nursing facility received additional neb in route by EMS. He has a history of asthma, A. fib, dementia and heart failure. No nausea no vomiting no fevers. She denies body aches chills congestion runny nose. There are no other complaints, changes, or physical findings at this time. Past History   Past Medical History:  Past Medical History:   Diagnosis Date    Anxiety     Arthropathy     Asthma     Atrial fibrillation (HCC)     CHF (congestive heart failure) ([facility] [address]     Community acquired pneumonia     Dementia ([facility] [address]     Gout     Hypertension     Ill-defined condition     dementia    Thyroid disease        Past Surgical History:  Past Surgical History:   Procedure Laterality Date    HX ORTHOPAEDIC      left ankle orif    HX TONSILLECTOMY         Family History:  History reviewed. No pertinent family history. Social History:  Social History     Tobacco Use    Smoking status: Never    Smokeless tobacco: Never   Substance Use Topics    Alcohol use: Never    Drug use: Never       Allergies:   Allergies   Allergen Reactions    Cipro [Ciprofloxacin Hcl] Other (comments)    Ketek [Telithromycin] Other (comments)    Mobic [Meloxicam] Other (comments)    Penicillin Other (comments)    Shrimp Extract Other (comments)       PCP: Nano Moore MD    Current Facility-Administered Medications   Medication Dose Route Frequency Provider Last Rate Last Admin    magnesium sulfate 1 g/100 ml IVPB (premix or compounded)  1 g IntraVENous ONCE Alanna Duran  mL/hr at 12/14/22 1755 1 g at 12/14/22 1755     Current Outpatient Medications   Medication Sig Dispense Refill    Saccharomyces boulardii (Florastor) 250 mg capsule Take 250 mg by mouth two (2) times a day. albuterol (PROVENTIL HFA, VENTOLIN HFA, PROAIR HFA) 90 mcg/actuation inhaler Take 1-2 Puffs by inhalation every four (4) hours as needed for Wheezing. 1 g 0    allopurinoL (ZYLOPRIM) 300 mg tablet Take 300 mg by mouth daily. thyroid, Pork, ([place] Thyroid) 60 mg tablet Take 60 mg by mouth daily. cyanocobalamin (Vitamin B-12) 1,000 mcg tablet Take 2,000 mcg by mouth daily. montelukast (SINGULAIR) 10 mg tablet Take 10 mg by mouth daily. cholecalciferol (Vitamin D3) (1000 Units /25 mcg) tablet Take 1,000 Units by mouth daily. (Patient not taking: Reported on 6/29/2022)      cetirizine (ZyrTEC) 10 mg tablet Take 10 mg by mouth nightly. furosemide (Lasix) 20 mg tablet Take 20 mg by mouth daily as needed. (Patient not taking: Reported on 6/29/2022)      doxycycline (VIBRAMYCIN) 100 mg capsule Take 1 Capsule by mouth two (2) times a day. (Patient not taking: Reported on 6/29/2022) 14 Capsule 0    Symbicort 160-4.5 mcg/actuation HFAA       lisinopriL (PRINIVIL, ZESTRIL) 5 mg tablet Take 5 mg by mouth daily. memantine (NAMENDA) 5 mg tablet TAKE 1 TABLET BY MOUTH EVERY DAY FOR 7 DAYS THEN 1 TABLET BY MOUTH TWICE DAILY      metoprolol tartrate (LOPRESSOR) 50 mg tablet 50 mg daily. Take 50mg in am and 25mg in pm      Xarelto 20 mg tab tablet 15 mg daily (with breakfast). sertraline (ZOLOFT) 50 mg tablet Take 50 mg by mouth daily. nitrofurantoin, macrocrystal-monohydrate, (MACROBID) 100 mg capsule Take 1 Capsule by mouth two (2) times a day. (Patient not taking: Reported on 6/29/2022) 10 Capsule 0     Review of Systems   Review of Systems   Constitutional:  Positive for activity change.  Negative for chills, diaphoresis and fatigue. HENT:  Negative for congestion, ear pain, nosebleeds and sore throat. Eyes: Negative. Negative for pain, discharge and redness. Respiratory:  Positive for cough, chest tightness, shortness of breath and wheezing. Cardiovascular: Negative. Negative for chest pain, palpitations and leg swelling. Gastrointestinal: Negative. Negative for abdominal pain, constipation, diarrhea, nausea and vomiting. Endocrine: Negative. Negative for cold intolerance. Genitourinary: Negative. Negative for difficulty urinating, dysuria and hematuria. Musculoskeletal: Negative. Negative for arthralgias, joint swelling and neck pain. Skin: Negative. Negative for color change, pallor, rash and wound. Allergic/Immunologic: Negative. Neurological: Negative. Negative for dizziness, syncope, weakness, light-headedness and headaches. Hematological: Negative. Does not bruise/bleed easily. Psychiatric/Behavioral: Negative. Negative for behavioral problems, confusion and suicidal ideas. All other systems reviewed and are negative. Physical Exam   Physical Exam  Vitals reviewed. Constitutional:       General: She is not in acute distress. Appearance: She is well-developed. She is not ill-appearing. HENT:      Head: Normocephalic. Mouth/Throat:      Mouth: Mucous membranes are moist.   Eyes:      Pupils: Pupils are equal, round, and reactive to light. Cardiovascular:      Rate and Rhythm: Normal rate and regular rhythm. Heart sounds: Murmur heard. Pulmonary:      Effort: Tachypnea, accessory muscle usage and respiratory distress (Moderate) present. Breath sounds: Examination of the right-upper field reveals wheezing. Examination of the left-upper field reveals wheezing. Examination of the right-middle field reveals wheezing. Examination of the left-middle field reveals wheezing. Examination of the right-lower field reveals wheezing.  Examination of the left-lower field reveals wheezing. Wheezing present. Chest:      Chest wall: No mass, deformity, tenderness or crepitus. Musculoskeletal:         General: Normal range of motion. Cervical back: Normal range of motion. Skin:     General: Skin is warm and dry. Capillary Refill: Capillary refill takes less than 2 seconds. Neurological:      General: No focal deficit present. Mental Status: She is alert. Psychiatric:         Mood and Affect: Mood normal.       Lab and Diagnostic Study Results   Labs -     Recent Results (from the past 12 hour(s))   EKG, 12 LEAD, INITIAL    Collection Time: 12/14/22  2:36 PM   Result Value Ref Range    Ventricular Rate 71 BPM    Atrial Rate 124 BPM    P-R Interval 148 ms    QRS Duration 105 ms    Q-T Interval 386 ms    QTC Calculation (Bezet) 420 ms    Calculated R Axis -52 degrees    Calculated T Axis 78 degrees    Diagnosis       Atrial fibrillation  Ventricular premature complex  Left anterior fascicular block  Minimal ST depression, lateral leads     CBC WITH AUTOMATED DIFF    Collection Time: 12/14/22  2:50 PM   Result Value Ref Range    WBC 5.6 4.6 - 13.2 K/uL    RBC 4.20 4. 20 - 5.30 M/uL    HGB 12.3 12.0 - 16.0 g/dL    HCT 37.8 35.0 - 45.0 %    MCV 90.0 78.0 - 100.0 FL    MCH 29.3 24.0 - 34.0 PG    MCHC 32.5 31.0 - 37.0 g/dL    RDW 14.5 11.6 - 14.5 %    PLATELET 421 631 - 071 K/uL    MPV 10.8 9.2 - 11.8 FL    NRBC 0.0 0.0  WBC    ABSOLUTE NRBC 0.00 0.00 - 0.01 K/uL    NEUTROPHILS 76 (H) 40 - 73 %    LYMPHOCYTES 13 (L) 21 - 52 %    MONOCYTES 10 3 - 10 %    EOSINOPHILS 0 0 - 5 %    BASOPHILS 0 0 - 2 %    OTHER CELL 1 %    IMMATURE GRANULOCYTES 0 %    ABS. NEUTROPHILS 4.3 1.8 - 8.0 K/UL    ABS. LYMPHOCYTES 0.7 (L) 0.9 - 3.6 K/UL    ABS. MONOCYTES 0.6 0.05 - 1.2 K/UL    ABS. EOSINOPHILS 0.0 0.0 - 0.4 K/UL    ABS. BASOPHILS 0.0 0.0 - 0.1 K/UL    ABS. IMM.  GRANS. 0.0 K/UL    DF AUTOMATED      RBC COMMENTS Anisocytosis  1+       PROTHROMBIN TIME + INR Collection Time: 12/14/22  2:50 PM   Result Value Ref Range    Prothrombin time 26.6 (H) 11.5 - 15.2 sec    INR 2.4 (H) 0.8 - 1.2     METABOLIC PANEL, BASIC    Collection Time: 12/14/22  2:50 PM   Result Value Ref Range    Sodium 134 (L) 136 - 145 mmol/L    Potassium 4.2 3.5 - 5.5 mmol/L    Chloride 97 (L) 100 - 111 mmol/L    CO2 29 21 - 32 mmol/L    Anion gap 8 3.0 - 18.0 mmol/L    Glucose 151 (H) 74 - 99 mg/dL    BUN 23 (H) 7 - 18 mg/dL    Creatinine 0.99 0.60 - 1.30 mg/dL    BUN/Creatinine ratio 23 (H) 12 - 20      eGFR 55 (L) >60 ml/min/1.73m2    Calcium 8.7 8.5 - 10.1 mg/dL   TROPONIN-HIGH SENSITIVITY    Collection Time: 12/14/22  2:50 PM   Result Value Ref Range    Troponin-High Sensitivity 13 0 - 54 ng/L   NT-PRO BNP    Collection Time: 12/14/22  2:50 PM   Result Value Ref Range    NT pro-BNP 3,511 (H) 0 - 1,800 pg/mL   COVID-19 RAPID TEST    Collection Time: 12/14/22  2:50 PM   Result Value Ref Range    COVID-19 rapid test Not Detected Not Detected     INFLUENZA A & B AG (RAPID TEST)    Collection Time: 12/14/22  2:50 PM   Result Value Ref Range    Influenza A Antigen Negative Negative      Influenza B Antigen Negative Negative         Radiologic Studies -      CXR Results  (Last 48 hours)                 12/14/22 1452  XR CHEST PORT Final result    Impression:      Findings of mild vascular congestion with small bilateral (left greater than   right) pleural effusions. Narrative:  EXAM: XR CHEST PORT       CLINICAL INDICATION/HISTORY: cp, sob   -Additional: None       COMPARISON: June 29, 2022       TECHNIQUE: Frontal view of the chest       _______________       FINDINGS:       HEART AND MEDIASTINUM: Stable appearing cardiac size and mediastinal contours. LUNGS AND PLEURAL SPACES: Mild pulmonary vascular congestion noted with small   left greater than right pleural effusions. No dense consolidation or   pneumothorax.        BONY THORAX AND SOFT TISSUES: No acute osseous abnormality _______________                   Medical Decision Making and ED Course   Differential Diagnosis & Medical Decision Making Provider Note:       Patient presenting to the emergency department with shortness of breath diffuse inspiratory and expiratory wheezes noted in all lung fields on arrival.  She has had multiple rounds of albuterol starting at her nursing facility earlier today. She received steroid here as well as magnesium. She has had additional duo nebs as well as a continuous neb here in the emergency department she is improved but still diffusely wheezing still tachypneic with some accessory muscle use. X-ray shows some mild to moderate pulmonary edema given Lasix for mild volume overload. Clinically no evidence of infection. No viral infection, heart rate is within normal limits respiratory rate oxygen saturation and temperature showed no sign of SIRS. WBCs normal.      Given patient's multiple medication allergies and lack of findings of infectious process holding antibiotics at this time. Given patient's volume status no fluids given at this time. She is not requiring additional oxygen at this time but still having diffuse wheezes and still moderately tachypneic.    - I am the first and primary provider for this patient. I reviewed the vital signs, available nursing notes, past medical history, past surgical history, family history and social history. The patient's presenting problems have been discussed, and the staff are in agreement with the care plan formulated and outlined with them. I have encouraged them to ask questions as they arise throughout their visit. Vital Signs-Reviewed the patient's vital signs.   Patient Vitals for the past 12 hrs:   Temp Pulse Resp BP SpO2   12/14/22 1757 -- (!) 109 22 121/60 98 %   12/14/22 1600 -- -- -- -- 94 %   12/14/22 1558 -- 82 -- 136/78 --   12/14/22 1504 -- -- -- -- 94 %   12/14/22 1410 98.6 °F (37 °C) 95 22 (!) 120/51 96 %       EKG interpretation: (Preliminary): EKG Interpreted by me. Shows shows atrial fibrillation. No STEMI, rate of 71 bpm.   ms. ED Course:      0    Procedures and Critical Care     Performed by: Charlette Lechuga MD  Procedures       CRITICAL CARE NOTE :    5:50 PM    IMPENDING DETERIORATION -Airway and Respiratory  ASSOCIATED RISK FACTORS - Hypoxia, Dysrhythmia, and Metabolic changes  MANAGEMENT- Bedside Assessment and Supervision of Care  INTERPRETATION -  Xrays, ECG, Blood Pressure, and Cardiac Output Measures   INTERVENTIONS - hemodynamic mngmt, vent mngmt, and Metobolic interventions  CASE REVIEW - Hospitalist/Intensivist, Nursing, and Family  TREATMENT RESPONSE -Improved  PERFORMED BY - Self    NOTES   :  I have spent 35 minutes of critical care time involved in lab review, consultations with specialist, family decision- making, bedside attention and documentation. This time excludes time spent in any separate billed procedures. During this entire length of time I was immediately available to the patient . Charlette Lechuga MD    Disposition   Disposition: Admitted to Floor Medical Floor the case was discussed with the admitting physician Dr. Oren Neely       Diagnosis/Clinical Impression     Clinical Impression:   1. Acute on chronic congestive heart failure, unspecified heart failure type [facility])        Attestations: Melinda Pérez MD, am the primary clinician of record. Please note that this dictation was completed with [employer]orm[employer], the computer voice recognition software. Quite often unanticipated grammatical, syntax, homophones, and other interpretive errors are inadvertently transcribed by the computer software. Please disregard these errors. Please excuse any errors that have escaped final proofreading. Thank you.

## 2022-12-14 NOTE — Clinical Note
Status[de-identified] INPATIENT [101]   Type of Bed: Telemetry [19]   Cardiac Monitoring Required?: Yes   Inpatient Hospitalization Certified Necessary for the Following Reasons: 3.  Patient receiving treatment that can only be provided in an inpatient setting (further clarification in H&P documentation)   Admitting Diagnosis: Asthma attack [[id number]]   Admitting Diagnosis: Status asthmaticus with COPD (chronic obstructive pulmonary disease) [facility] [[id number]]   Admitting Physician: Zac Uriostegui [[id number]]   Attending Physician: Zac Uriostegui [[id number]]   Estimated Length of Stay: 2 Midnights   Discharge Plan[de-identified] [address] (e.g. Adult Home, Nursing Home, etc.)

## 2022-12-14 NOTE — ED TRIAGE NOTES
Lives at memory care at the [facility]. Cough and congestion x 1 week. Brought in by EMS for difficulty breathing. Wheezing, SOB, neb at 10 am. Nebulizer adm by EMS in route to ED. Auditory wheezing noted.    # 20 left AC

## 2022-12-14 NOTE — PROGRESS NOTES
Duoneb given to pt via nebulizer, Exp wheezes were loud,  and more audible after tx plus improved aeration. No resp distress. PT sats 99% on room air and HR 68.

## 2022-12-15 ENCOUNTER — APPOINTMENT (OUTPATIENT)
Dept: NON INVASIVE DIAGNOSTICS | Age: 87
End: 2022-12-15
Attending: NURSE PRACTITIONER
Payer: MEDICARE

## 2022-12-15 LAB
ANION GAP SERPL CALC-SCNC: 12 MMOL/L (ref 3–18)
BASOPHILS # BLD: 0 K/UL (ref 0–0.1)
BASOPHILS NFR BLD: 0 % (ref 0–2)
BUN SERPL-MCNC: 23 MG/DL (ref 7–18)
BUN/CREAT SERPL: 26 (ref 12–20)
CA-I BLD-MCNC: 9 MG/DL (ref 8.5–10.1)
CHLORIDE SERPL-SCNC: 93 MMOL/L (ref 100–111)
CO2 SERPL-SCNC: 26 MMOL/L (ref 21–32)
CREAT SERPL-MCNC: 0.9 MG/DL (ref 0.6–1.3)
DIFFERENTIAL METHOD BLD: ABNORMAL
ECHO AO ASC DIAM: 4 CM
ECHO AO ASCENDING AORTA INDEX: 2.25 CM/M2
ECHO AO ROOT DIAM: 3.2 CM
ECHO AO ROOT INDEX: 1.8 CM/M2
ECHO AV AREA PEAK VELOCITY: 2.5 CM2
ECHO AV AREA VTI: 2.5 CM2
ECHO AV AREA/BSA PEAK VELOCITY: 1.4 CM2/M2
ECHO AV AREA/BSA VTI: 1.4 CM2/M2
ECHO AV MEAN GRADIENT: 8 MMHG
ECHO AV MEAN VELOCITY: 1.4 M/S
ECHO AV PEAK GRADIENT: 12 MMHG
ECHO AV PEAK VELOCITY: 1.7 M/S
ECHO AV VELOCITY RATIO: 0.59
ECHO AV VTI: 37.7 CM
ECHO EST RA PRESSURE: 8 MMHG
ECHO IVC PROX: 2.6 CM
ECHO LA AREA 2C: 23.9 CM2
ECHO LA AREA 4C: 28.4 CM2
ECHO LA MAJOR AXIS: 6.2 CM
ECHO LA MINOR AXIS: 5.8 CM
ECHO LA VOL BP: 96 ML (ref 22–52)
ECHO LA VOL/BSA BIPLANE: 54 ML/M2 (ref 16–34)
ECHO LV EDV A2C: 71 ML
ECHO LV EDV A4C: 71 ML
ECHO LV EDV INDEX A4C: 40 ML/M2
ECHO LV EDV NDEX A2C: 40 ML/M2
ECHO LV EJECTION FRACTION A2C: 44 %
ECHO LV EJECTION FRACTION A4C: 47 %
ECHO LV EJECTION FRACTION BIPLANE: 43 % (ref 55–100)
ECHO LV ESV A2C: 39 ML
ECHO LV ESV A4C: 38 ML
ECHO LV ESV INDEX A2C: 22 ML/M2
ECHO LV ESV INDEX A4C: 21 ML/M2
ECHO LV FRACTIONAL SHORTENING: 24 % (ref 28–44)
ECHO LV INTERNAL DIMENSION DIASTOLE INDEX: 2.87 CM/M2
ECHO LV INTERNAL DIMENSION DIASTOLIC: 5.1 CM (ref 3.9–5.3)
ECHO LV INTERNAL DIMENSION SYSTOLIC INDEX: 2.19 CM/M2
ECHO LV INTERNAL DIMENSION SYSTOLIC: 3.9 CM
ECHO LV IVSD: 1.4 CM (ref 0.6–0.9)
ECHO LV MASS 2D: 270.1 G (ref 67–162)
ECHO LV MASS INDEX 2D: 151.7 G/M2 (ref 43–95)
ECHO LV POSTERIOR WALL DIASTOLIC: 1.2 CM (ref 0.6–0.9)
ECHO LV RELATIVE WALL THICKNESS RATIO: 0.47
ECHO LVOT AREA: 4.5 CM2
ECHO LVOT AV VTI INDEX: 0.56
ECHO LVOT DIAM: 2.4 CM
ECHO LVOT MEAN GRADIENT: 2 MMHG
ECHO LVOT PEAK GRADIENT: 4 MMHG
ECHO LVOT PEAK VELOCITY: 1 M/S
ECHO LVOT STROKE VOLUME INDEX: 53.3 ML/M2
ECHO LVOT SV: 95 ML
ECHO LVOT VTI: 21 CM
ECHO MV AREA VTI: 3.5 CM2
ECHO MV LVOT VTI INDEX: 1.3
ECHO MV MAX VELOCITY: 1.3 M/S
ECHO MV MEAN GRADIENT: 3 MMHG
ECHO MV MEAN VELOCITY: 0.7 M/S
ECHO MV PEAK GRADIENT: 7 MMHG
ECHO MV VTI: 27.4 CM
ECHO PULMONARY ARTERY END DIASTOLIC PRESSURE: 10 MMHG
ECHO PULMONARY ARTERY END DIASTOLIC PRESSURE: 21 MMHG
ECHO PV MAX VELOCITY: 0.8 M/S
ECHO PV MAX VELOCITY: 2.3 M/S
ECHO PV PEAK GRADIENT: 3 MMHG
ECHO PV REGURGITANT MAX VELOCITY: 1.6 M/S
ECHO RA AREA 4C: 23.7 CM2
ECHO RA END SYSTOLIC VOLUME APICAL 4 CHAMBER INDEX BSA: 39 ML/M2
ECHO RA VOLUME: 70 ML
ECHO RIGHT VENTRICULAR SYSTOLIC PRESSURE (RVSP): 41 MMHG
ECHO RV BASAL DIMENSION: 4.1 CM
ECHO RV LONGITUDINAL DIMENSION: 6.2 CM
ECHO RV MID DIMENSION: 3 CM
ECHO RV TAPSE: 1.7 CM (ref 1.7–?)
ECHO TV REGURGITANT MAX VELOCITY: 2.88 M/S
ECHO TV REGURGITANT PEAK GRADIENT: 33 MMHG
EOSINOPHIL # BLD: 0 K/UL (ref 0–0.4)
EOSINOPHIL NFR BLD: 1 % (ref 0–5)
ERYTHROCYTE [DISTWIDTH] IN BLOOD BY AUTOMATED COUNT: 14.2 % (ref 11.6–14.5)
GLUCOSE SERPL-MCNC: 253 MG/DL (ref 74–99)
HCT VFR BLD AUTO: 36.9 % (ref 35–45)
HGB BLD-MCNC: 12.2 G/DL (ref 12–16)
IMM GRANULOCYTES # BLD AUTO: 0 K/UL
IMM GRANULOCYTES NFR BLD AUTO: 0 %
LACTATE SERPL-SCNC: 2.3 MMOL/L (ref 0.4–2)
LYMPHOCYTES # BLD: 0.3 K/UL (ref 0.9–3.6)
LYMPHOCYTES NFR BLD: 8 % (ref 21–52)
MAGNESIUM SERPL-MCNC: 2.4 MG/DL (ref 1.6–2.6)
MCH RBC QN AUTO: 28.4 PG (ref 24–34)
MCHC RBC AUTO-ENTMCNC: 33.1 G/DL (ref 31–37)
MCV RBC AUTO: 86 FL (ref 78–100)
MONOCYTES # BLD: 0 K/UL (ref 0.05–1.2)
MONOCYTES NFR BLD: 1 % (ref 3–10)
NEUTS SEG # BLD: 2.9 K/UL (ref 1.8–8)
NEUTS SEG NFR BLD: 83 % (ref 40–73)
NRBC # BLD: 0 K/UL (ref 0–0.01)
NRBC BLD-RTO: 0 PER 100 WBC
OTHER CELLS NFR BLD MANUAL: 7 %
PLATELET # BLD AUTO: 144 K/UL (ref 135–420)
PMV BLD AUTO: 9.7 FL (ref 9.2–11.8)
POTASSIUM SERPL-SCNC: 3.6 MMOL/L (ref 3.5–5.5)
RBC # BLD AUTO: 4.29 M/UL (ref 4.2–5.3)
RBC MORPH BLD: ABNORMAL
SODIUM SERPL-SCNC: 131 MMOL/L (ref 136–145)
TROPONIN-HIGH SENSITIVITY: 14 NG/L (ref 0–54)
TROPONIN-HIGH SENSITIVITY: 15 NG/L (ref 0–54)
WBC # BLD AUTO: 3.5 K/UL (ref 4.6–13.2)

## 2022-12-15 PROCEDURE — 85025 COMPLETE CBC W/AUTO DIFF WBC: CPT

## 2022-12-15 PROCEDURE — 94761 N-INVAS EAR/PLS OXIMETRY MLT: CPT

## 2022-12-15 PROCEDURE — 94640 AIRWAY INHALATION TREATMENT: CPT

## 2022-12-15 PROCEDURE — 74011250636 HC RX REV CODE- 250/636: Performed by: NURSE PRACTITIONER

## 2022-12-15 PROCEDURE — 36415 COLL VENOUS BLD VENIPUNCTURE: CPT

## 2022-12-15 PROCEDURE — 83735 ASSAY OF MAGNESIUM: CPT

## 2022-12-15 PROCEDURE — 77010033678 HC OXYGEN DAILY

## 2022-12-15 PROCEDURE — 97161 PT EVAL LOW COMPLEX 20 MIN: CPT

## 2022-12-15 PROCEDURE — 74011250636 HC RX REV CODE- 250/636: Performed by: INTERNAL MEDICINE

## 2022-12-15 PROCEDURE — 74011000250 HC RX REV CODE- 250: Performed by: INTERNAL MEDICINE

## 2022-12-15 PROCEDURE — 74011250637 HC RX REV CODE- 250/637: Performed by: NURSE PRACTITIONER

## 2022-12-15 PROCEDURE — 65270000029 HC RM PRIVATE

## 2022-12-15 PROCEDURE — 93306 TTE W/DOPPLER COMPLETE: CPT

## 2022-12-15 PROCEDURE — 80048 BASIC METABOLIC PNL TOTAL CA: CPT

## 2022-12-15 PROCEDURE — 83605 ASSAY OF LACTIC ACID: CPT

## 2022-12-15 PROCEDURE — 84484 ASSAY OF TROPONIN QUANT: CPT

## 2022-12-15 RX ORDER — LOPERAMIDE HCL 2 MG
2 TABLET ORAL
COMMUNITY

## 2022-12-15 RX ORDER — LISINOPRIL 2.5 MG/1
2.5 TABLET ORAL DAILY
COMMUNITY

## 2022-12-15 RX ORDER — EPINEPHRINE 0.3 MG/.3ML
0.3 INJECTION SUBCUTANEOUS
COMMUNITY

## 2022-12-15 RX ORDER — FUROSEMIDE 20 MG/1
TABLET ORAL
COMMUNITY

## 2022-12-15 RX ORDER — SAME BUTANEDISULFONATE/BETAINE 400-600 MG
250 POWDER IN PACKET (EA) ORAL 2 TIMES DAILY
Status: DISCONTINUED | OUTPATIENT
Start: 2022-12-15 | End: 2022-12-15 | Stop reason: CLARIF

## 2022-12-15 RX ORDER — ACETAMINOPHEN 500 MG
1000 TABLET ORAL
COMMUNITY

## 2022-12-15 RX ORDER — UREA 10 %
2 LOTION (ML) TOPICAL 2 TIMES DAILY
Status: DISCONTINUED | OUTPATIENT
Start: 2022-12-15 | End: 2022-12-15

## 2022-12-15 RX ORDER — PUMPKIN SEED EXTRACT/SOY GERM 300 MG
2 CAPSULE ORAL DAILY
COMMUNITY

## 2022-12-15 RX ORDER — GUAIFENESIN 600 MG/1
600 TABLET, EXTENDED RELEASE ORAL
COMMUNITY

## 2022-12-15 RX ORDER — ALBUTEROL SULFATE 0.83 MG/ML
2.5 SOLUTION RESPIRATORY (INHALATION) DAILY
Status: ON HOLD | COMMUNITY
End: 2022-12-16 | Stop reason: SDUPTHER

## 2022-12-15 RX ORDER — LEVOTHYROXINE AND LIOTHYRONINE 19; 4.5 UG/1; UG/1
60 TABLET ORAL DAILY
Status: DISCONTINUED | OUTPATIENT
Start: 2022-12-16 | End: 2022-12-16 | Stop reason: HOSPADM

## 2022-12-15 RX ORDER — ZOLPIDEM TARTRATE 5 MG/1
5 TABLET ORAL
COMMUNITY

## 2022-12-15 RX ORDER — PSEUDOEPH/DM/GUAIFEN/ACETAMIN 30-10-324
EXPECTORANT ORAL
COMMUNITY

## 2022-12-15 RX ORDER — ERGOCALCIFEROL 1.25 MG/1
50000 CAPSULE ORAL
COMMUNITY

## 2022-12-15 RX ORDER — SAME BUTANEDISULFONATE/BETAINE 400-600 MG
250 POWDER IN PACKET (EA) ORAL 2 TIMES DAILY
Status: DISCONTINUED | OUTPATIENT
Start: 2022-12-15 | End: 2022-12-16 | Stop reason: HOSPADM

## 2022-12-15 RX ADMIN — GUAIFENESIN 600 MG: 600 TABLET, EXTENDED RELEASE ORAL at 08:59

## 2022-12-15 RX ADMIN — METHYLPREDNISOLONE SODIUM SUCCINATE 40 MG: 40 INJECTION, POWDER, FOR SOLUTION INTRAMUSCULAR; INTRAVENOUS at 17:24

## 2022-12-15 RX ADMIN — METOPROLOL TARTRATE 50 MG: 50 TABLET, FILM COATED ORAL at 08:59

## 2022-12-15 RX ADMIN — CYANOCOBALAMIN TAB 500 MCG 2000 MCG: 500 TAB at 08:59

## 2022-12-15 RX ADMIN — GUAIFENESIN 600 MG: 600 TABLET, EXTENDED RELEASE ORAL at 21:18

## 2022-12-15 RX ADMIN — MOMETASONE FUROATE AND FORMOTEROL FUMARATE DIHYDRATE 2 PUFF: 200; 5 AEROSOL RESPIRATORY (INHALATION) at 07:31

## 2022-12-15 RX ADMIN — MEMANTINE 5 MG: 5 TABLET ORAL at 08:59

## 2022-12-15 RX ADMIN — MOMETASONE FUROATE AND FORMOTEROL FUMARATE DIHYDRATE 2 PUFF: 200; 5 AEROSOL RESPIRATORY (INHALATION) at 20:30

## 2022-12-15 RX ADMIN — LISINOPRIL 5 MG: 5 TABLET ORAL at 08:59

## 2022-12-15 RX ADMIN — ALLOPURINOL 300 MG: 300 TABLET ORAL at 08:59

## 2022-12-15 RX ADMIN — IPRATROPIUM BROMIDE AND ALBUTEROL SULFATE 3 ML: .5; 2.5 SOLUTION RESPIRATORY (INHALATION) at 15:33

## 2022-12-15 RX ADMIN — METHYLPREDNISOLONE SODIUM SUCCINATE 60 MG: 40 INJECTION, POWDER, FOR SOLUTION INTRAMUSCULAR; INTRAVENOUS at 05:08

## 2022-12-15 RX ADMIN — METHYLPREDNISOLONE SODIUM SUCCINATE 60 MG: 40 INJECTION, POWDER, FOR SOLUTION INTRAMUSCULAR; INTRAVENOUS at 00:03

## 2022-12-15 RX ADMIN — FUROSEMIDE 40 MG: 10 INJECTION, SOLUTION INTRAMUSCULAR; INTRAVENOUS at 08:59

## 2022-12-15 RX ADMIN — SERTRALINE 50 MG: 50 TABLET, FILM COATED ORAL at 08:59

## 2022-12-15 RX ADMIN — IPRATROPIUM BROMIDE AND ALBUTEROL SULFATE 3 ML: .5; 2.5 SOLUTION RESPIRATORY (INHALATION) at 11:49

## 2022-12-15 RX ADMIN — IPRATROPIUM BROMIDE AND ALBUTEROL SULFATE 3 ML: .5; 2.5 SOLUTION RESPIRATORY (INHALATION) at 07:31

## 2022-12-15 RX ADMIN — CETIRIZINE HYDROCHLORIDE 10 MG: 10 TABLET, FILM COATED ORAL at 21:18

## 2022-12-15 RX ADMIN — BENZONATATE 100 MG: 100 CAPSULE ORAL at 21:18

## 2022-12-15 RX ADMIN — MONTELUKAST 10 MG: 10 TABLET, FILM COATED ORAL at 08:59

## 2022-12-15 RX ADMIN — IPRATROPIUM BROMIDE AND ALBUTEROL SULFATE 3 ML: .5; 2.5 SOLUTION RESPIRATORY (INHALATION) at 20:30

## 2022-12-15 RX ADMIN — RIVAROXABAN 15 MG: 15 TABLET, FILM COATED ORAL at 08:59

## 2022-12-15 NOTE — PROGRESS NOTES
12/15/22 0325   Critical Result Types   Type of Critical Result Laboratory   Critical Lab Result Types   Critical Lab Value Lactic Acid   Lactic Acid Value 2.3   Notification Information   Notified By (Name) [facility]   Time of Critical Result Notification 0320   Verbal Readback Provided Yes   Provider Notification   Was Provider Notified Yes   Name of Provider SAÚL Heath NP   Provider Gave Verbal Readback Yes   Time Provider Notified [id number]   Date Provider Notified 12/15/22   Were Orders Received No

## 2022-12-15 NOTE — H&P
History and Physical    Subjective:     Sudha Peters is a 80 y.o. female with a past medical history significant for CHF (unknown EF), HTN, HLP, atrial fibrillation (on Xarelto), asthma, dementia, hypothyroidism and gout who presents to the ED today from the [facility] with complaints of upper respiratory symptoms in the last 1 week. History is obtained from patient, and supplemented by her daughter who was present at bedside. Daughter reports that patient had been coughing in the last 1 week. She has also been having runny noses which she also attributes to \"my seasonal allergies\". Per daughter, cough worsened in the last 3 days with associated wheezing, she was started on cough medicine and some inhalers but without any effectiveness. No febrile episodes reported. No nausea, or vomit. Patient denies any weakness or body aches. States \" I am just tired\". Patient also complains of chest tightness associated with coughing 3 days ago but not at the moment. Due to worsening cough and wheezing she was sent over to the ED. In the ED, a chest x-ray shows mild pulmonary vascular congestion with small bilateral pleural effusions. Her proBNP was 3511, afebrile no tachycardia, not hypoxic, Hospitalist was asked to admit. Past Medical History:   Diagnosis Date    Anxiety     Arthropathy     Asthma     Atrial fibrillation (HCC)     CHF (congestive heart failure) ([address]     Community acquired pneumonia     Dementia ([facility] [address]     Gout     Hypertension     Ill-defined condition     dementia    Thyroid disease       Past Surgical History:   Procedure Laterality Date    HX ORTHOPAEDIC      left ankle orif    HX TONSILLECTOMY       History reviewed. No pertinent family history. Social History     Tobacco Use    Smoking status: Never    Smokeless tobacco: Never   Substance Use Topics    Alcohol use: Never       Prior to Admission medications    Medication Sig Start Date End Date Taking?  Authorizing Provider Saccharomyces boulardii (Florastor) 250 mg capsule Take 250 mg by mouth two (2) times a day. Jena Latif MD   albuterol (PROVENTIL HFA, VENTOLIN HFA, PROAIR HFA) 90 mcg/actuation inhaler Take 1-2 Puffs by inhalation every four (4) hours as needed for Wheezing. 4/24/22   Sarah Olea MD   allopurinoL (ZYLOPRIM) 300 mg tablet Take 300 mg by mouth daily. Jena Latif MD   thyroid, Pork, ([place] Thyroid) 60 mg tablet Take 60 mg by mouth daily. Jena Latif MD   cyanocobalamin (Vitamin B-12) 1,000 mcg tablet Take 2,000 mcg by mouth daily. Jena Latif MD   montelukast (SINGULAIR) 10 mg tablet Take 10 mg by mouth daily. Jena Latif MD   cholecalciferol (Vitamin D3) (1000 Units /25 mcg) tablet Take 1,000 Units by mouth daily. Patient not taking: Reported on 6/29/2022    Jena Latif MD   cetirizine (ZyrTEC) 10 mg tablet Take 10 mg by mouth nightly. Jena Latif MD   furosemide (Lasix) 20 mg tablet Take 20 mg by mouth daily as needed. Patient not taking: Reported on 6/29/2022    Jena Latif MD   doxycycline (VIBRAMYCIN) 100 mg capsule Take 1 Capsule by mouth two (2) times a day. Patient not taking: Reported on 6/29/2022 10/1/21   Avinash Dean NP   Symbicort 160-4.5 mcg/actuation HFAA  7/10/21   Provider, Historical   lisinopriL (PRINIVIL, ZESTRIL) 5 mg tablet Take 5 mg by mouth daily. 5/25/21   Provider, Historical   memantine (NAMENDA) 5 mg tablet TAKE 1 TABLET BY MOUTH EVERY DAY FOR 7 DAYS THEN 1 TABLET BY MOUTH TWICE DAILY 8/12/21   Provider, Historical   metoprolol tartrate (LOPRESSOR) 50 mg tablet 50 mg daily. Take 50mg in am and 25mg in pm 5/25/21   Provider, Historical   Xarelto 20 mg tab tablet 15 mg daily (with breakfast). 6/25/21   Provider, Historical   sertraline (ZOLOFT) 50 mg tablet Take 50 mg by mouth daily. 8/19/21   Provider, Historical   nitrofurantoin, macrocrystal-monohydrate, (MACROBID) 100 mg capsule Take 1 Capsule by mouth two (2) times a day.   Patient not taking: Reported on 6/29/2022 8/26/21   Kelsey Vazquez NP     Allergies   Allergen Reactions    Cipro [Ciprofloxacin Hcl] Other (comments)    Ketek [Telithromycin] Other (comments)    Mobic [Meloxicam] Other (comments)    Penicillin Other (comments)    Shrimp Extract Other (comments)        Review of Systems:  Negative except as mentioned in HPI. Objective:     Vitals:  Visit Vitals  /60   Pulse (!) 102   Temp 98.5 °F (36.9 °C)   Resp 19   Ht 5' 9\" (1.753 m)   Wt 63.5 kg (140 lb)   SpO2 100%   BMI 20.67 kg/m²       Physical Exam:  General: Pleasant elderly female who is not ill or toxic appearing, awake, alert, orients x2-3, hard of hearing, and able to follow commands. Speaks in clear full sentences. Head: Normocephalic, without obvious abnormality, atraumatic. Eyes: Conjunctivae/corneas clear. Pupils equal, round, reactive to light. Extraocular movements intact. No icterus. Lungs: Severe inspiratory and expiratory wheezing in all lobes, with mild basilar crackles. Chest wall: Kyphotic chest appearance. Heart: Irregularly irregular rhythm, normal rate, S1-S2 auscultated, no murmur, click, rubs or gallops. Abdomen: soft, flat, non-tender to palpation, bowel sounds active in all quadrants, no palpable masses. Back: No spine tenderness to palpation  Extremities: Extremities normal, atraumatic, no cyanosis or peripheral edema. Pulses: 2+ and symmetric all extremities. Skin: Skin color, texture, turgor normal.   Lymph nodes: Cervical nodes normal.  Neurologic: Awake and oriented, x2. No focal neurologic deficit.       Labs:  Recent Results (from the past 24 hour(s))   EKG, 12 LEAD, INITIAL    Collection Time: 12/14/22  2:36 PM   Result Value Ref Range    Ventricular Rate 71 BPM    Atrial Rate 124 BPM    P-R Interval 148 ms    QRS Duration 105 ms    Q-T Interval 386 ms    QTC Calculation (Bezet) 420 ms    Calculated R Axis -52 degrees    Calculated T Axis 78 degrees    Diagnosis Atrial fibrillation  Ventricular premature complex  Left anterior fascicular block  Minimal ST depression, lateral leads     CBC WITH AUTOMATED DIFF    Collection Time: 12/14/22  2:50 PM   Result Value Ref Range    WBC 5.6 4.6 - 13.2 K/uL    RBC 4.20 4. 20 - 5.30 M/uL    HGB 12.3 12.0 - 16.0 g/dL    HCT 37.8 35.0 - 45.0 %    MCV 90.0 78.0 - 100.0 FL    MCH 29.3 24.0 - 34.0 PG    MCHC 32.5 31.0 - 37.0 g/dL    RDW 14.5 11.6 - 14.5 %    PLATELET 383 752 - 905 K/uL    MPV 10.8 9.2 - 11.8 FL    NRBC 0.0 0.0  WBC    ABSOLUTE NRBC 0.00 0.00 - 0.01 K/uL    NEUTROPHILS 76 (H) 40 - 73 %    LYMPHOCYTES 13 (L) 21 - 52 %    MONOCYTES 10 3 - 10 %    EOSINOPHILS 0 0 - 5 %    BASOPHILS 0 0 - 2 %    OTHER CELL 1 %    IMMATURE GRANULOCYTES 0 %    ABS. NEUTROPHILS 4.3 1.8 - 8.0 K/UL    ABS. LYMPHOCYTES 0.7 (L) 0.9 - 3.6 K/UL    ABS. MONOCYTES 0.6 0.05 - 1.2 K/UL    ABS. EOSINOPHILS 0.0 0.0 - 0.4 K/UL    ABS. BASOPHILS 0.0 0.0 - 0.1 K/UL    ABS. IMM.  GRANS. 0.0 K/UL    DF AUTOMATED      RBC COMMENTS Anisocytosis  1+       PROTHROMBIN TIME + INR    Collection Time: 12/14/22  2:50 PM   Result Value Ref Range    Prothrombin time 26.6 (H) 11.5 - 15.2 sec    INR 2.4 (H) 0.8 - 1.2     METABOLIC PANEL, BASIC    Collection Time: 12/14/22  2:50 PM   Result Value Ref Range    Sodium 134 (L) 136 - 145 mmol/L    Potassium 4.2 3.5 - 5.5 mmol/L    Chloride 97 (L) 100 - 111 mmol/L    CO2 29 21 - 32 mmol/L    Anion gap 8 3.0 - 18.0 mmol/L    Glucose 151 (H) 74 - 99 mg/dL    BUN 23 (H) 7 - 18 mg/dL    Creatinine 0.99 0.60 - 1.30 mg/dL    BUN/Creatinine ratio 23 (H) 12 - 20      eGFR 55 (L) >60 ml/min/1.73m2    Calcium 8.7 8.5 - 10.1 mg/dL   TROPONIN-HIGH SENSITIVITY    Collection Time: 12/14/22  2:50 PM   Result Value Ref Range    Troponin-High Sensitivity 13 0 - 54 ng/L   NT-PRO BNP    Collection Time: 12/14/22  2:50 PM   Result Value Ref Range    NT pro-BNP 3,511 (H) 0 - 1,800 pg/mL   COVID-19 RAPID TEST    Collection Time: 12/14/22  2:50 PM Result Value Ref Range    COVID-19 rapid test Not Detected Not Detected     INFLUENZA A & B AG (RAPID TEST)    Collection Time: 12/14/22  2:50 PM   Result Value Ref Range    Influenza A Antigen Negative Negative      Influenza B Antigen Negative Negative         Imaging:  XR CHEST PORT    Result Date: 12/14/2022  EXAM: XR CHEST PORT CLINICAL INDICATION/HISTORY: cp, sob -Additional: None COMPARISON: June 29, 2022 TECHNIQUE: Frontal view of the chest _______________ FINDINGS: HEART AND MEDIASTINUM: Stable appearing cardiac size and mediastinal contours. LUNGS AND PLEURAL SPACES: Mild pulmonary vascular congestion noted with small left greater than right pleural effusions. No dense consolidation or pneumothorax. BONY THORAX AND SOFT TISSUES: No acute osseous abnormality _______________     Findings of mild vascular congestion with small bilateral (left greater than right) pleural effusions. Assessment & Plan:     #1: Asthma Exacerbation:  -preswnts with 1 week episode of cough and wheezing, with rhinorrhea, and \"allegies\". -s/p Mag sulfate and albuterol in ED  -admit to inpatient, monitor on tele  -cont duoneb q 4 hrs  -start solumedrol 60mg q 6 hr-  -cont montelukast, symbicort, per home dose  -Afebrile, no leukocytosis, influenza panel and rapid covid tests are negative  -trend lactic.   -mucinex 600mg bid  -cont O2 supp prn to keep sats>95%  -consider 6-minute walk at discharge. #2: Acute on Chronic Congestive Heart Failure:  -unknown EF, her trop is negative, I will trend trop,   -Ecg-A. fib/71, Qtc 420,   -check Echocardiogram  -start lasix 40mg IV daily.  -cont O2 supp  -CXR shows mild vascular congestion, with small bilateral pleural effusions (L>R). Her pro-bnp is 3511.  -daily weights, I&Os, 1500ml fluid restriction  -cont her low dose lisinopril.  -Cardiology consultation. #3: Atrial fibrillation:  -chronic issue, controlled rates. -cont metoprolol, and Xarelto.     #4: Hypothyroidism:  -cont Lucero Thyroid per home dose. #5: Hypertension:  -chronic issue, well controlled. -Cont lisinopril and metoprolol. #5: Dementia:  -cont Namenda. #6: Gout:  -cont allopurinol          VTE prophylaxis: Xarelto  Code status: DNR/DNI per daughter at bedside. Total time spent: 45 minutes  Plan of care discussed with patient and nursing staff.

## 2022-12-15 NOTE — PROGRESS NOTES
1930  Received care of pt lying in bed with daughter at bedside. Routine assessment and vs completed. Pt is alert and pleasantly confused to time and place. Pt assisted up to [facility] and tolerated well. 2100  HS meds given and snack provided. 0035  Pt wheezing upon assessment. RT made aware and in and treatment given. Pt tolerated well    0130  Sleeping with no distress noted    0400  Am labs obtained and pt is resting quietly with daughter at bedside.

## 2022-12-15 NOTE — PROGRESS NOTES
Trial period off oxygen, the pt oxygen saturation decreased to 89-90% on room air while resting in bed.

## 2022-12-15 NOTE — PROGRESS NOTES
Admission Medication Reconciliation:    Information obtained from:  [facility]    Comments/Recommendations: Reviewed PTA medications and patient's allergies. Verified home medication list using MAR from [facility] [address] [ciprofloxacin hcl], Ketek [telithromycin], Mobic [meloxicam], Penicillin, and Shrimp extract    Significant PMH/Disease States:   Past Medical History:   Diagnosis Date    Anxiety     Arthropathy     Asthma     Atrial fibrillation (HCC)     CHF (congestive heart failure) ([facility] [address]     Community acquired pneumonia     Dementia ([facility] [address]     Gout     Hypertension     Ill-defined condition     dementia    Thyroid disease      Chief Complaint for this Admission:    Chief Complaint   Patient presents with    Wheezing    Shortness of Breath     Prior to Admission Medications:   Prior to Admission Medications   Prescriptions Last Dose Informant Patient Reported? Taking? EPINEPHrine (EPIPEN) 0.3 mg/0.3 mL injection   Yes Yes   Si.3 mg by IntraMUSCular route once as needed for Allergic Response. Saccharomyces boulardii (FLORASTOR) 250 mg capsule 2022  Yes Yes   Sig: Take 500 mg by mouth two (2) times a day. Symbicort 160-4.5 mcg/actuation HFAA 2022  Yes Yes   Sig: Take 2 Puffs by inhalation two (2) times a day. acetaminophen (TYLENOL) 500 mg tablet   Yes Yes   Sig: Take 1,000 mg by mouth every eight (8) hours as needed for Pain or Fever. albuterol (PROVENTIL HFA, VENTOLIN HFA, PROAIR HFA) 90 mcg/actuation inhaler 2022  No Yes   Sig: Take 1-2 Puffs by inhalation every four (4) hours as needed for Wheezing. albuterol (PROVENTIL VENTOLIN) 2.5 mg /3 mL (0.083 %) nebu   Yes Yes   Si.5 mg by Nebulization route daily. allopurinoL (ZYLOPRIM) 300 mg tablet 2022  Yes Yes   Sig: Take 300 mg by mouth daily. cetirizine (ZYRTEC) 10 mg tablet 2022  Yes Yes   Sig: Take 10 mg by mouth nightly.    cyanocobalamin 1,000 mcg tablet 2022  Yes Yes   Sig: Take 2,000 mcg by mouth daily. d-mannose (AZO D-Mannose) 500 mg cap   Yes Yes   Sig: Take 2 Capsules by mouth daily. ergocalciferol (ERGOCALCIFEROL) 1,250 mcg (50,000 unit) capsule   Yes Yes   Sig: Take 50,000 Units by mouth every seven (7) days. furosemide (LASIX) 20 mg tablet   Yes Yes   Sig: Take  by mouth daily as needed for PRN Reason (Other) (edema). guaiFENesin ER (Mucinex) 600 mg ER tablet   Yes Yes   Sig: Take 600 mg by mouth two (2) times daily as needed for Congestion. lisinopriL (PRINIVIL, ZESTRIL) 2.5 mg tablet   Yes Yes   Sig: Take 2.5 mg by mouth daily. loperamide (Anti-Diarrheal, Loperamide,) 2 mg tablet   Yes Yes   Sig: Take 2 mg by mouth daily as needed for Diarrhea.   memantine (NAMENDA) 5 mg tablet 2022  Yes Yes   Sig: TAKE 1 TABLET BY MOUTH EVERY DAY FOR 7 DAYS THEN 1 TABLET BY MOUTH TWICE DAILY   metoprolol tartrate (LOPRESSOR) 50 mg tablet 2022  Yes Yes   Si mg daily. Take 50mg in am and 25mg in pm   montelukast (SINGULAIR) 10 mg tablet 2022  Yes Yes   Sig: Take 10 mg by mouth daily. neomycin-bacitracin-polymyxin (Triple Antibiotic) 3.5mg-400 unit- 5,000 unit/gram ointment   Yes Yes   Sig: Apply  to affected area daily as needed. Any skin tear or scrape   rivaroxaban (Xarelto) 15 mg tab tablet   Yes Yes   Sig: Take 15 mg by mouth daily. sertraline (ZOLOFT) 50 mg tablet 2022  Yes Yes   Sig: Take 50 mg by mouth daily. thyroid, Pork, (ARMOUR) 60 mg tablet 2022  Yes Yes   Sig: Take 60 mg by mouth daily. zolpidem (AMBIEN) 5 mg tablet   Yes Yes   Sig: Take 5 mg by mouth nightly as needed for Sleep.       Facility-Administered Medications: None       [facility]

## 2022-12-15 NOTE — RT PROTOCOL NOTE
RT Inhaler-Nebulizer Bronchodilator Protocol Note    There is a bronchodilator order in the chart from a provider indicating to follow the RT Bronchodilator Protocol and there is an Initiate RT Bronchodilator Protocol order as well (see protocol at bottom of note). CXR Findings:  Recent Results (from the past 2 days)    XR CHEST PORT 12/14/2022    Impression  Findings of mild vascular congestion with small bilateral (left greater than  right) pleural effusions. The findings from the last RT Protocol Assessment were as follows:  History of Pulmonary Disease: Chronic pulmonary disease  Respiratory Pattern: Regular pattern and RR 12-20 bpm  Breath Sounds: Inspiratory and expiratory or bilateral wheezing and/or rhonchi  Cough: Strong, spontaneous, non-productive  Indication for Bronchodilator Therapy: On home bronchodilators  Bronchodilator Assessment Score: 8    Aerosolized bronchodilator medication orders have been revised according to the RT Inhaler-Nebulizer Bronchodilator Protocol below. Respiratory Therapist to perform RT Therapy Protocol Assessment initially then follow the protocol. Repeat RT Therapy Protocol Assessment PRN for score 0-3 or on second treatment, BID, and PRN for scores above 3. No Indications - adjust the frequency to every 6 hours PRN wheezing or bronchospasm, if no treatments needed after 48 hours then discontinue using Per Protocol order mode. If indication present, adjust the RT bronchodilator orders based on the Bronchodilator Assessment Score as indicated below. Use Inhaler orders unless patient has one or more of the following: on home nebulizer, not able to hold breath for 10 seconds, is not alert and oriented, cannot activate and use MDI correctly, or respiratory rate 25 breaths per minute or more, then use the equivalent nebulizer order(s) with same Frequency and PRN reasons based on the score.   If a patient is on this medication at home then do not decrease Frequency below that used at home. 0-3 - enter or revise RT bronchodilator order(s) to equivalent RT Bronchodilator order with Frequency of every 4 hours PRN for wheezing or increased work of breathing using Per Protocol order mode. 4-6 - enter or revise RT Bronchodilator order(s) to two equivalent RT bronchodilator orders with one order with BID Frequency and one order with Frequency of every 4 hours PRN wheezing or increased work of breathing using Per Protocol order mode. 7-10 - enter or revise RT Bronchodilator order(s) to two equivalent RT bronchodilator orders with one order with TID Frequency and one order with Frequency of every 4 hours PRN wheezing or increased work of breathing using Per Protocol order mode. 11-13 - enter or revise RT Bronchodilator order(s) to one equivalent RT bronchodilator order with QID Frequency and an Albuterol order with Frequency of every 4 hours PRN wheezing or increased work of breathing using Per Protocol order mode. Greater than 13 - enter or revise RT Bronchodilator order(s) to one equivalent RT bronchodilator order with every 4 hours Frequency and an Albuterol order with Frequency of every 2 hours PRN wheezing or increased work of breathing using Per Protocol order mode. RT to enter RT Home Evaluation for COPD & MDI Assessment order using Per Protocol order mode.     Electronically signed by Vera August on 12/14/2022 at 11:24 PM

## 2022-12-15 NOTE — PROGRESS NOTES
Problem: Falls - Risk of  Goal: *Absence of Falls  Description: Document Yadyda Gamma Fall Risk and appropriate interventions in the flowsheet.   Outcome: Progressing Towards Goal  Note: Fall Risk Interventions:                                Problem: Patient Education: Go to Patient Education Activity  Goal: Patient/Family Education  Outcome: Progressing Towards Goal

## 2022-12-15 NOTE — CONSULTS
CARDIOLOGY CONSULTATION      REASON FOR CONSULT: Heart failure    HISTORY OF PRESENT ILLNESS:  Cori Sue is a 80y.o. year-old female with past medical history significant for A-Fib (Xarelto), heart failure, HLD, and asthma who was evaluated today due to cough and shortness of breath and chest tightness. Daughter states symptoms began last week and has become progressively worse. Labs and imaging show Troponin negative, EKG A-Fib rate controlled, CXR with mild  vascular edema. On examination, she is sitting up in bed, no acute distress, on supplemental oxygen with daughter at bedside. She states breathing has improved. She denies chest pain, dizziness, palpitations, or edema. Records from hospital admission course thus far reviewed. Telemetry reviewed. INPATIENT MEDICATIONS:  Home medications reviewed.     Current Facility-Administered Medications:     methylPREDNISolone (PF) (SOLU-MEDROL) injection 60 mg, 60 mg, IntraVENous, Q6H, Oba, Oluwabunmi S, NP, 60 mg at 12/15/22 2710    acetaminophen (TYLENOL) tablet 650 mg, 650 mg, Oral, Q6H PRN, Oba, Oluwabunmi S, NP    furosemide (LASIX) injection 40 mg, 40 mg, IntraVENous, DAILY, Oba, Oluwabunmi S, NP, 40 mg at 12/15/22 0859    guaiFENesin ER (MUCINEX) tablet 600 mg, 600 mg, Oral, Q12H, Oba, Oluwabunmi S, NP, 600 mg at 12/15/22 0859    benzonatate (TESSALON) capsule 100 mg, 100 mg, Oral, TID PRN, Oba, Oluwabunmi S, NP    ondansetron (ZOFRAN ODT) tablet 4 mg, 4 mg, Oral, Q8H PRN, Oba, Oluwabunmi S, NP    allopurinoL (ZYLOPRIM) tablet 300 mg, 300 mg, Oral, DAILY, Oba, Oluwabunmi S, NP, 300 mg at 12/15/22 0859    cetirizine (ZYRTEC) tablet 10 mg, 10 mg, Oral, QHS, Oba, Oluwabunmi S, NP, 10 mg at 12/14/22 2120    cyanocobalamin (VITAMIN B12) tablet 2,000 mcg, 2,000 mcg, Oral, DAILY, Oba, Oluwabunmi S, NP, 2,000 mcg at 12/15/22 0859    lisinopriL (PRINIVIL, ZESTRIL) tablet 5 mg, 5 mg, Oral, DAILY, Oba, Oluwabunmi S, NP, 5 mg at 12/15/22 0859    memantine (NAMENDA) tablet 5 mg, 5 mg, Oral, DAILY, Oba, Oluwabunmi S, NP, 5 mg at 12/15/22 0859    metoprolol tartrate (LOPRESSOR) tablet 50 mg, 50 mg, Oral, DAILY, Oba, Oluwabunmi S, NP, 50 mg at 12/15/22 0859    montelukast (SINGULAIR) tablet 10 mg, 10 mg, Oral, DAILY, Oba, Oluwabunmi S, NP, 10 mg at 12/15/22 9842    Saccharomyces boulardii (FLORASTOR) capsule 250 mg, 250 mg, Oral, BID, Oba, Oluwabunmi S, NP    sertraline (ZOLOFT) tablet 50 mg, 50 mg, Oral, DAILY, Oba, Oluwabunmi S, NP, 50 mg at 12/15/22 0859    mometasone-formoterol (DULERA) 200mcg-5mcg/puff, 2 Puff, Inhalation, BID RT, Oba, Oluwabunmi S, NP, 2 Puff at 12/15/22 0731    thyroid (Pork) (ARMOUR) tablet 60 mg, 60 mg, Oral, DAILY, Oba, Oluwabunmi S, NP    rivaroxaban (XARELTO) tablet 15 mg, 15 mg, Oral, DAILY WITH BREAKFAST, Oba, Oluwabunmi S, NP, 15 mg at 12/15/22 0859    albuterol (PROVENTIL VENTOLIN) nebulizer solution 2.5 mg, 2.5 mg, Nebulization, Q4H PRN, Mckenzie Ahmadi MD    albuterol-ipratropium (DUO-NEB) 2.5 MG-0.5 MG/3 ML, 3 mL, Nebulization, QID RT, Mckenzie Ahmadi MD, 3 mL at 12/15/22 1149     ALLERGIES:  Allergies reviewed with the patient,  Allergies   Allergen Reactions    Cipro [Ciprofloxacin Hcl] Other (comments)    Ketek [Telithromycin] Other (comments)    Mobic [Meloxicam] Other (comments)    Penicillin Other (comments)    Shrimp Extract Other (comments)       REVIEW OF SYSTEMS:  Complete review of systems performed, pertinents noted above, all other systems are negative. Physical examination:  No apparent distress. Heart is irregular, rate and rhythm. Normal S1, S2, no murmurs are appreciated. Lungs are clear bilaterally. Abdomen is soft, nontender, normal bowel sounds. Extremities have no edema.      Visit Vitals  BP (!) 148/68 (BP 1 Location: Left upper arm, BP Patient Position: At rest)   Pulse 100   Temp 97.2 °F (36.2 °C)   Resp 18   Ht 5' 9\" (1.753 m)   Wt 63.5 kg (140 lb)   SpO2 94%   BMI 20.67 kg/m²       Recent labs results and imaging reviewed. Discussed case with Dr. Jennifer Fonseca and our impression and recommendations are as follows:  Heart failure: Troponins are negative x 3. Initial EKG is nonischemic. BNP is 3511. Chest xray indicates mild pulmonary edema. Agree with diuresing with Lasix  IV. Cr 0.90. Recommend daily weights, strict I&Os, and 1500 ml fluid restrictions/24 hours. Continue telemetry monitoring. Will obtain echocardiogram to assess overall cardiac structure and function. Atrial Fibrillation: Rate  controlled. Continue metoprolol and Xarelto. Recommend to keep serum potassium between 4-5 and serum magnesium > 2. Hypertension: Blood pressure acceptable. Continue current medication regimen    Thank you for involving us in the care of this patient. Please do not hesitate to call Dr. Jennifer Fonseca if additional questions arise.     DONELL Miller  12/15/2022

## 2022-12-15 NOTE — PROGRESS NOTES
Problem: Mobility Impaired (Adult and Pediatric)  Goal: *Acute Goals and Plan of Care (Insert Text)  Description: Pt ambulates with MOD (I) and performs transfers with MOD (I) . PLOF: Facility ambulator, Rollator, (I) ADLs    Outcome: Resolved/Met     Problem: Patient Education: Go to Patient Education Activity  Goal: Patient/Family Education  Outcome: Resolved/Met   PHYSICAL THERAPY EVALUATION AND DISCHARGE    Patient: Toya Veras [de-identified]80 y.o. female)  Date: 12/15/2022   Start Time: 0910   Stop Time: 0933  $$ Initial PT Evaluation: Low Complex 20 Min                Primary Diagnosis: Asthma attack [J45.901]  Status asthmaticus with COPD (chronic obstructive pulmonary disease) ([facility] [J44.9, J45.902]       Precautions: N/A       ASSESSMENT :  Based on the objective data described below, the patient presents with asthma exacerbation and she is currently on 2L. She performs mobility well, she is able to toilet without assistance and she ambulates with no c/o dyspnea. She returns to room and to bed and is left with all needs met. She can return to the [facility] once medically stable. Patient does not require further skilled intervention at this level of care. PLAN :  Recommendations and Planned Interventions:   No formal PT needs identified at this time. Discharge Recommendations: Return to the [facility]/Jesse Heard Final: 24/24  Further Equipment Recommendations for Discharge: O2? SUBJECTIVE:   Patient stated I usually walk around pretty good.     OBJECTIVE DATA SUMMARY:     Past Medical History:   Diagnosis Date    Anxiety     Arthropathy     Asthma     Atrial fibrillation (HCC)     CHF (congestive heart failure) ([facility]     Community acquired pneumonia     Dementia ([facility]     Gout     Hypertension     Ill-defined condition     dementia    Thyroid disease      Past Surgical History:   Procedure Laterality Date    HX ORTHOPAEDIC      left ankle orif    HX TONSILLECTOMY       Barriers to Learning/Limitations: None  Compensate with: N/A  Home Situation:   Home Situation  Home Environment: Assisted living (Memory care)  [facility] [place] Name: [facility]  One/Alyssia Cerrato Residence: One story  Living Alone: No  Support Systems: Assisted Living, Child(alyssa)  Patient Expects to be Discharged to[de-identified] Assisted Living  Current DME Used/Available at Home: [employer], rollator  Critical Behavior:  Neurologic State: Alert  Orientation Level: Appropriate for age  Cognition: Follows commands     Psychosocial  Patient Behaviors: Cooperative  Family  Behaviors: Cooperative     Family  Behaviors: Cooperative  Strength:    Strength: Within functional limits    Tone & Sensation:   Tone: Normal  Sensation: Intact  Coordination:  Coordination: Within functional limits  Range Of Motion:   AROM: Within functional limits  PROM: Within functional limits    Posture:  Posture (WDL): Within defined limits     Functional Mobility:  Bed Mobility:  Rolling: Modified independent  Supine to Sit: Modified independent  Sit to Supine: Modified independent  Scooting: Modified independent  Transfers:  Sit to Stand: Modified independent  Stand to Sit: Modified independent  Stand Pivot Transfers: Modified independent     Toilet Transfers : Modified independent     Balance:   Sitting: Intact  Standing: Intact  Ambulation/Gait Training:  Distance (ft): 300 Feet (ft)  Assistive Device: Walker, rolling  Ambulation - Level of Assistance: Modified independent     Gait Description (WDL): Exceptions to WDL   AM-PAC:  24/24; Current research shows that an AM-PAC score of 17 or less is typically not associated with a discharge to the patient's home setting, whereas a score of 18 or greater is typically associated with a discharge to the patient's home setting. Pain:  Pain level pre-treatment: 0/10   Pain level post-treatment: 0/10  Pain Location: N/A  Activity Tolerance:   Good  Please refer to the flowsheet for vital signs taken during this treatment.   After treatment:   [] Patient left in no apparent distress sitting up in chair  [x]         Patient left in no apparent distress in bed  [x]         Call bell left within reach  [x]         Nursing notified  [x]         Daughter present  []         Bed alarm activated  []         SCDs applied    COMMUNICATION/EDUCATION:   [x]         Role of Physical Therapy in the acute care setting. [x]         Fall prevention education was provided and the patient/caregiver indicated understanding. [x]         Patient/family have participated as able in goal setting and plan of care. [x]         Patient/family agree to work toward stated goals and plan of care. []         Patient understands intent and goals of therapy, but is neutral about his/her participation. []         Patient is unable to participate in goal setting/plan of care: ongoing with therapy staff.  []         Other:     Thank you for this referral.  Hernando Mann, PT, DPT   Time Calculation: 23 mins

## 2022-12-15 NOTE — PROGRESS NOTES
Advance Care Planning     General Advance Care Planning (ACP) Conversation      Date of Conversation: 12/14/2022  Conducted with: Healthcare Decision Maker: Named in Advance Directive or Healthcare Power of [facility]:     Primary Decision Maker: Jose Guadalupe Hurd - Child - [phone]  Click here to complete [address] including selection of the [address] Relationship (ie \"Primary\")    Today we documented Decision Maker(s) consistent with Legal Next of Kin hierarchy.     Content/Action Overview:   Has NO ACP documents/care preferences - requested patient complete ACP documents  Reviewed DNR/DNI and patient confirms current DNR status - completed forms on file (place new order if needed)  Topics discussed: treatment goals       Length of Voluntary ACP Conversation in minutes:  <16 minutes (Non-Billable)    Megan Johnson RN

## 2022-12-15 NOTE — PROGRESS NOTES
Hospitalist Progress Note             Date of Service:  12/15/2022  NAME:  Chintan Coffman  :  1934  MRN:  [id number]    Assessment & Plan:      #1: Asthma Exacerbation:  -preswnts with 1 week episode of cough and wheezing, with rhinorrhea, and \"allegies\". -s/p Mag sulfate and albuterol in ED  -admit to inpatient, monitor on tele  -cont duoneb q 4 hrs  -start solumedrol 60mg q 6 hr-  -cont montelukast, symbicort, per home dose  -Afebrile, no leukocytosis, influenza panel and rapid covid tests are negative  -trend lactic.   -mucinex 600mg bid  -cont O2 supp prn to keep sats>95%  -consider 6-minute walk at discharge. #2: Acute on Chronic Congestive Heart Failure:  -unknown EF, her trop is negative, I will trend trop,   -Ecg-A. fib/71, Qtc 420,   -check Echocardiogram  -start lasix 40mg IV daily.  -cont O2 supp  -CXR shows mild vascular congestion, with small bilateral pleural effusions (L>R). Her pro-bnp is 3511.  -daily weights, I&Os, 1500ml fluid restriction  -cont her low dose lisinopril.  -Cardiology consultation. #3: Atrial fibrillation:  -chronic issue, controlled rates. -cont metoprolol, and Xarelto. #4: Hypothyroidism:  -cont [place] Thyroid per home dose. #5: Hypertension:  -chronic issue, well controlled. -Cont lisinopril and metoprolol. #5: Dementia:  -cont [place]. #6: Gout:  -cont allopurinol      Pt is still requiring 02, still actively wheezing, ok at rest, very sob with activity.    Cont aggressive treatgment with steriods and nebs, cont gentle diuresis    Hospital Problems  Date Reviewed: 10/3/2021            Codes Class Noted POA    Asthma attack ICD-10-CM: J45.901  ICD-9-CM: 493.92  2022 Unknown        Status asthmaticus with COPD (chronic obstructive pulmonary disease) ([facility] ICD-10-CM: J44.9, J45.902  ICD-9-CM: 493.21  2022 Unknown             Review of Systems:   A comprehensive review of systems was negative except for that written in the HPI. Vital Signs:    Last 24hrs VS reviewed since prior progress note. Most recent are:  Visit Vitals  BP (!) 148/68 (BP 1 Location: Left upper arm, BP Patient Position: At rest)   Pulse 100   Temp 97.2 °F (36.2 °C)   Resp 18   Ht 5' 9\" (1.753 m)   Wt 63.5 kg (140 lb)   SpO2 94%   BMI 20.67 kg/m²         Intake/Output Summary (Last 24 hours) at 12/15/2022 1312  Last data filed at 12/14/2022 1759  Gross per 24 hour   Intake --   Output 300 ml   Net -300 ml        Physical Examination:             General:          Alert, cooperative, no distress, appears stated age. HEENT:           Atraumatic, anicteric sclerae, pink conjunctivae                          No oral ulcers, mucosa moist, throat clear, dentition fair  Neck:               Supple, symmetrical  Lungs:             + wheeze  Chest wall:      No tenderness  No Accessory muscle use. Heart:              Regular  rhythm,  No  murmur   No edema  Abdomen:        Soft, non-tender. Not distended. Bowel sounds normal  Extremities:     No cyanosis. No clubbing,                            Skin turgor normal, Capillary refill normal  Skin:                Not pale. Not Jaundiced  No rashes   Psych:             Not anxious or agitated.   Neurologic:      Alert, moves all extremities, answers questions appropriately and responds to commands        Data Review:    Review and/or order of clinical lab test  Review and/or order of tests in the radiology section of CPT  Review and/or order of tests in the medicine section of CPT      Labs:     Recent Labs     12/15/22  0433 12/14/22  1450   WBC 3.5* 5.6   HGB 12.2 12.3   HCT 36.9 37.8    165     Recent Labs     12/15/22  0433 12/14/22  1450   * 134*   K 3.6 4.2   CL 93* 97*   CO2 26 29   BUN 23* 23*   CREA 0.90 0.99   * 151*   CA 9.0 8.7   MG 2.4  --      No results for input(s): ALT, AP, TBIL, TBILI, TP, ALB, GLOB, GGT, AML, LPSE in the last 72 hours. No lab exists for component: SGOT, GPT, AMYP, HLPSE  Recent Labs     12/14/22  1450   INR 2.4*   PTP 26.6*      No results for input(s): FE, TIBC, PSAT, FERR in the last 72 hours. Lab Results   Component Value Date/Time    Folate 12.9 05/11/2022 11:06 AM      No results for input(s): PH, PCO2, PO2 in the last 72 hours. No results for input(s): CPK, CKNDX, TROIQ in the last 72 hours.     No lab exists for component: CPKMB  Lab Results   Component Value Date/Time    Cholesterol, total 137 04/06/2021 11:23 AM    HDL Cholesterol 71 04/06/2021 11:23 AM    LDL, calculated 52.8 04/06/2021 11:23 AM    Triglyceride 66 04/06/2021 11:23 AM    CHOL/HDL Ratio 1.9 04/06/2021 11:23 AM     No results found for: [facility]  Lab Results   Component Value Date/Time    Color Yellow 08/12/2022 04:43 PM    Appearance Clear 08/12/2022 04:43 PM    Specific gravity 1.017 08/12/2022 04:43 PM    pH (UA) 5.5 08/12/2022 04:43 PM    Protein Negative 08/12/2022 04:43 PM    Glucose Negative 08/12/2022 04:43 PM    Ketone Negative 08/12/2022 04:43 PM    Bilirubin Negative 08/12/2022 04:43 PM    Urobilinogen 0.2 08/12/2022 04:43 PM    Nitrites Negative 08/12/2022 04:43 PM    Leukocyte Esterase Moderate (A) 08/12/2022 04:43 PM    Epithelial cells Moderate 08/12/2022 04:43 PM    Bacteria 1+ (A) 08/12/2022 04:43 PM    WBC 20-50 08/12/2022 04:43 PM    RBC 0-5 08/12/2022 04:43 PM         Medications Reviewed:     Current Facility-Administered Medications   Medication Dose Route Frequency    methylPREDNISolone (PF) (SOLU-MEDROL) injection 60 mg  60 mg IntraVENous Q6H    acetaminophen (TYLENOL) tablet 650 mg  650 mg Oral Q6H PRN    furosemide (LASIX) injection 40 mg  40 mg IntraVENous DAILY    guaiFENesin ER (MUCINEX) tablet 600 mg  600 mg Oral Q12H    benzonatate (TESSALON) capsule 100 mg  100 mg Oral TID PRN    ondansetron (ZOFRAN ODT) tablet 4 mg  4 mg Oral Q8H PRN    allopurinoL (ZYLOPRIM) tablet 300 mg  300 mg Oral DAILY    cetirizine (ZYRTEC) tablet 10 mg  10 mg Oral QHS    cyanocobalamin (VITAMIN B12) tablet 2,000 mcg  2,000 mcg Oral DAILY    lisinopriL (PRINIVIL, ZESTRIL) tablet 5 mg  5 mg Oral DAILY    memantine (NAMENDA) tablet 5 mg  5 mg Oral DAILY    metoprolol tartrate (LOPRESSOR) tablet 50 mg  50 mg Oral DAILY    montelukast (SINGULAIR) tablet 10 mg  10 mg Oral DAILY    Saccharomyces boulardii (FLORASTOR) capsule 250 mg  250 mg Oral BID    sertraline (ZOLOFT) tablet 50 mg  50 mg Oral DAILY    mometasone-formoterol (DULERA) 200mcg-5mcg/puff  2 Puff Inhalation BID RT    thyroid (Pork) (ARMOUR) tablet 60 mg  60 mg Oral DAILY    rivaroxaban (XARELTO) tablet 15 mg  15 mg Oral DAILY WITH BREAKFAST    albuterol (PROVENTIL VENTOLIN) nebulizer solution 2.5 mg  2.5 mg Nebulization Q4H PRN    albuterol-ipratropium (DUO-NEB) 2.5 MG-0.5 MG/3 ML  3 mL Nebulization QID RT     ______________________________________________________________________  EXPECTED LENGTH OF STAY: 3d 0h  ACTUAL LENGTH OF STAY:          1                 Lexii Mackenzie MD

## 2022-12-15 NOTE — PROGRESS NOTES
Care Management Interventions  PCP Verified by CM: Yes  Palliative Care Criteria Met (RRAT>21 & CHF Dx)?: No  Mode of Transport at Discharge: Other (see comment) (DNR)  Transition of Care Consult (CM Consult): Assisted Living  MyChart Signup: No  Discharge Durable Medical Equipment: No  Health Maintenance Reviewed: Yes  Physical Therapy Consult: Yes  Occupational Therapy Consult: Yes  Speech Therapy Consult: No  Support Systems: Child(alyssa)  Confirm Follow Up Transport: Family  The Patient and/or Patient Representative was Provided with a Choice of Provider and Agrees with the Discharge Plan?: Yes  Freedom of Choice List was Provided with Basic Dialogue that Supports the Patient's Individualized Plan of Care/Goals, Treatment Preferences and Shares the Quality Data Associated with the Providers?: Yes  Discharge Location  Patient Expects to be Discharged to[de-identified] Assisted Living  Reason for Admission: Chart reviewed and noted patient presented from The [address] [place] [address] to the ER with C/O upper respiratory symptoms in the last 1 week. Pt's daughter states the pt has been coughing in the last 1 week. She also is having a runny nose which she attributes to \"my seasonal allergies\". Her cough worsened in the last 3 days with associated wheezing. She was started on cough medicine and some inhalers, but without any effectiveness. Pt states, \"I am just tired\". She C/O chest tightness associated with coughing 3 days ago, but not at the moment. Due to her worsening cough and wheezing, she was sent over to the ER. In the ER, a chest x-ray shows mild pulmonary vascular congestion with small bilateral pleural effusions. Her proBNP was 3511.      Dx: Asthma Exacerbation, Acute on Chronic Congestive Heart Failure     PMH: CHF, HTN, HLP, Atrial Fibrillation, Asthma, Dementia, Hypothyroidism, Gout                        RUR Score: 10%                    Plan for utilizing home health: TBD         PCP: First and Last name: Janeth Paul MD     Name of Practice:    Are you a current patient: Yes/No:    Approximate date of last visit:    Can you participate in a virtual visit with your PCP:                     Current Advanced Directive/Advance Care Plan: DNR      Healthcare Decision Maker: Carly Babin- [phone]   Click here to complete [employer] including selection of the Healthcare Decision Maker Relationship (ie \"Primary\")                             Transition of Care Plan: Patient is a resident at [address] She has a rollator that she uses with ambulation. Teach back and medication reconciliation will be completed. Nurse will make follow-up appointments. [facility]

## 2022-12-16 VITALS
OXYGEN SATURATION: 94 % | TEMPERATURE: 97.5 F | HEART RATE: 88 BPM | WEIGHT: 145 LBS | HEIGHT: 69 IN | DIASTOLIC BLOOD PRESSURE: 78 MMHG | BODY MASS INDEX: 21.48 KG/M2 | RESPIRATION RATE: 17 BRPM | SYSTOLIC BLOOD PRESSURE: 130 MMHG

## 2022-12-16 LAB
ANION GAP SERPL CALC-SCNC: 10 MMOL/L (ref 3–18)
BASOPHILS # BLD: 0 K/UL (ref 0–0.1)
BASOPHILS NFR BLD: 0 % (ref 0–2)
BUN SERPL-MCNC: 29 MG/DL (ref 7–18)
BUN/CREAT SERPL: 29 (ref 12–20)
CA-I BLD-MCNC: 9.2 MG/DL (ref 8.5–10.1)
CHLORIDE SERPL-SCNC: 92 MMOL/L (ref 100–111)
CO2 SERPL-SCNC: 29 MMOL/L (ref 21–32)
CREAT SERPL-MCNC: 0.99 MG/DL (ref 0.6–1.3)
DIFFERENTIAL METHOD BLD: ABNORMAL
EOSINOPHIL # BLD: 0 K/UL (ref 0–0.4)
EOSINOPHIL NFR BLD: 0 % (ref 0–5)
ERYTHROCYTE [DISTWIDTH] IN BLOOD BY AUTOMATED COUNT: 14.3 % (ref 11.6–14.5)
GLUCOSE SERPL-MCNC: 175 MG/DL (ref 74–99)
HCT VFR BLD AUTO: 40.6 % (ref 35–45)
HGB BLD-MCNC: 13.3 G/DL (ref 12–16)
IMM GRANULOCYTES # BLD AUTO: 0.1 K/UL (ref 0–0.04)
IMM GRANULOCYTES NFR BLD AUTO: 1 % (ref 0–0.5)
LYMPHOCYTES # BLD: 1.2 K/UL (ref 0.9–3.6)
LYMPHOCYTES NFR BLD: 8 % (ref 21–52)
MAGNESIUM SERPL-MCNC: 2.1 MG/DL (ref 1.6–2.6)
MCH RBC QN AUTO: 29.6 PG (ref 24–34)
MCHC RBC AUTO-ENTMCNC: 32.8 G/DL (ref 31–37)
MCV RBC AUTO: 90.2 FL (ref 78–100)
MONOCYTES # BLD: 0.5 K/UL (ref 0.05–1.2)
MONOCYTES NFR BLD: 3 % (ref 3–10)
NEUTS SEG # BLD: 14 K/UL (ref 1.8–8)
NEUTS SEG NFR BLD: 88 % (ref 40–73)
NRBC # BLD: 0 K/UL (ref 0–0.01)
NRBC BLD-RTO: 0 PER 100 WBC
PHOSPHATE SERPL-MCNC: 3 MG/DL (ref 2.5–4.9)
PLATELET # BLD AUTO: 195 K/UL (ref 135–420)
PMV BLD AUTO: 10.2 FL (ref 9.2–11.8)
POTASSIUM SERPL-SCNC: 3.6 MMOL/L (ref 3.5–5.5)
RBC # BLD AUTO: 4.5 M/UL (ref 4.2–5.3)
SODIUM SERPL-SCNC: 131 MMOL/L (ref 136–145)
WBC # BLD AUTO: 15.9 K/UL (ref 4.6–13.2)

## 2022-12-16 PROCEDURE — 94640 AIRWAY INHALATION TREATMENT: CPT

## 2022-12-16 PROCEDURE — 94761 N-INVAS EAR/PLS OXIMETRY MLT: CPT

## 2022-12-16 PROCEDURE — 74011250636 HC RX REV CODE- 250/636: Performed by: NURSE PRACTITIONER

## 2022-12-16 PROCEDURE — 84100 ASSAY OF PHOSPHORUS: CPT

## 2022-12-16 PROCEDURE — 85025 COMPLETE CBC W/AUTO DIFF WBC: CPT

## 2022-12-16 PROCEDURE — 74011250637 HC RX REV CODE- 250/637: Performed by: NURSE PRACTITIONER

## 2022-12-16 PROCEDURE — 74011000250 HC RX REV CODE- 250: Performed by: INTERNAL MEDICINE

## 2022-12-16 PROCEDURE — 83735 ASSAY OF MAGNESIUM: CPT

## 2022-12-16 PROCEDURE — 77010033678 HC OXYGEN DAILY

## 2022-12-16 PROCEDURE — 80048 BASIC METABOLIC PNL TOTAL CA: CPT

## 2022-12-16 PROCEDURE — 94618 PULMONARY STRESS TESTING: CPT

## 2022-12-16 PROCEDURE — 36415 COLL VENOUS BLD VENIPUNCTURE: CPT

## 2022-12-16 PROCEDURE — 74011250636 HC RX REV CODE- 250/636: Performed by: INTERNAL MEDICINE

## 2022-12-16 RX ORDER — ALBUTEROL SULFATE 0.83 MG/ML
2.5 SOLUTION RESPIRATORY (INHALATION)
Qty: 60 EACH | Refills: 0 | Status: SHIPPED | OUTPATIENT
Start: 2022-12-16

## 2022-12-16 RX ORDER — METHYLPREDNISOLONE 4 MG/1
TABLET ORAL
Qty: 1 DOSE PACK | Refills: 0 | Status: SHIPPED | OUTPATIENT
Start: 2022-12-16 | End: 2022-12-16 | Stop reason: SDUPTHER

## 2022-12-16 RX ORDER — METHYLPREDNISOLONE 4 MG/1
TABLET ORAL
Qty: 1 DOSE PACK | Refills: 0 | Status: SHIPPED | OUTPATIENT
Start: 2022-12-16

## 2022-12-16 RX ORDER — METOPROLOL SUCCINATE 50 MG/1
50 TABLET, EXTENDED RELEASE ORAL DAILY
Qty: 30 TABLET | Refills: 0 | Status: SHIPPED | OUTPATIENT
Start: 2022-12-16

## 2022-12-16 RX ADMIN — Medication 250 MG: at 09:00

## 2022-12-16 RX ADMIN — FUROSEMIDE 40 MG: 10 INJECTION, SOLUTION INTRAMUSCULAR; INTRAVENOUS at 08:18

## 2022-12-16 RX ADMIN — ALLOPURINOL 300 MG: 300 TABLET ORAL at 08:17

## 2022-12-16 RX ADMIN — MEMANTINE 5 MG: 5 TABLET ORAL at 08:17

## 2022-12-16 RX ADMIN — IPRATROPIUM BROMIDE AND ALBUTEROL SULFATE 3 ML: .5; 2.5 SOLUTION RESPIRATORY (INHALATION) at 08:03

## 2022-12-16 RX ADMIN — METOPROLOL TARTRATE 50 MG: 50 TABLET, FILM COATED ORAL at 08:17

## 2022-12-16 RX ADMIN — METHYLPREDNISOLONE SODIUM SUCCINATE 40 MG: 40 INJECTION, POWDER, FOR SOLUTION INTRAMUSCULAR; INTRAVENOUS at 05:42

## 2022-12-16 RX ADMIN — LEVOTHYROXINE AND LIOTHYRONINE 60 MG: 19; 4.5 TABLET ORAL at 09:00

## 2022-12-16 RX ADMIN — MONTELUKAST 10 MG: 10 TABLET, FILM COATED ORAL at 08:18

## 2022-12-16 RX ADMIN — SERTRALINE 50 MG: 50 TABLET, FILM COATED ORAL at 08:17

## 2022-12-16 RX ADMIN — METHYLPREDNISOLONE SODIUM SUCCINATE 40 MG: 40 INJECTION, POWDER, FOR SOLUTION INTRAMUSCULAR; INTRAVENOUS at 00:55

## 2022-12-16 RX ADMIN — LISINOPRIL 5 MG: 5 TABLET ORAL at 08:17

## 2022-12-16 RX ADMIN — RIVAROXABAN 15 MG: 15 TABLET, FILM COATED ORAL at 08:17

## 2022-12-16 RX ADMIN — MOMETASONE FUROATE AND FORMOTEROL FUMARATE DIHYDRATE 2 PUFF: 200; 5 AEROSOL RESPIRATORY (INHALATION) at 08:03

## 2022-12-16 RX ADMIN — CYANOCOBALAMIN TAB 500 MCG 2000 MCG: 500 TAB at 08:17

## 2022-12-16 RX ADMIN — GUAIFENESIN 600 MG: 600 TABLET, EXTENDED RELEASE ORAL at 08:17

## 2022-12-16 NOTE — PROGRESS NOTES
1942 - Rounded on pt, pt's daughter is at bedside. Pt denies any c/o pain. Pt c/o cough. V/s and physical assessment completed. No other needs expressed at this time. CBWR and bed alarm on.     2118 - Rounded on pt, assisted pt to bedside commdoe and back to bed. HS medications administered per order. Pt tolerated well. CBWR.     2200 - Pt seen getting OOB by tele tech, writer at bedside assisting pt to bedside commode and back to bed.     2315 - Pt's bed alarm sounded and writerat bedside, pt attempting to get OOB. Pt assisted to bedside commode and back to bed. CBWR and reminded pt to use call bell before getting OOB and bed alarm on.     0055 - V/s obtained and scheduled medication administered. CBWR.     0143 - Pt seen bt tele tech trying to get OOB. Writer at bedside and assisted pt to bedside commode and back to bed. Bed alarm on and CBWR.     0300 - Pt's bed alarm sounded, writer at bedside, pt trying to get OOB, assisted pt to bedside commode and back to bed. Pt tolerated well. V/s obtained. Pt denies any pain at this time. CBWR. Bed alarm on.     0500 - Rounded on pt, assisted pt to bedside commode and back to bed. Pt tolerated well. No needs expressed by pt at this time. CBWR.

## 2022-12-16 NOTE — PROGRESS NOTES
Report called to the [facility]. Printed scripts. [facility] to arrange transportation. Patient Son and daughter notified of discharge. IV site Dc'd patient assisted with dressing.

## 2022-12-16 NOTE — PROGRESS NOTES
6 MWT completed on room air with patient using walker for support.        12/16/22 0836   Resting (Room Air)   SpO2 95   HR 88   During Walk (Room Air)   SpO2 92      Walk/Assistance Device Walker   Rate of Dyspnea 1   After Walk   SpO2 95   HR 97   O2 Device   (room air)   Rate of Dyspnea 0   Does the Patient Qualify for Home O2 No   Does the Patient Need Portable Oxygen Tanks No

## 2022-12-16 NOTE — DISCHARGE SUMMARY
Discharge Summary       PATIENT ID: Lashell Armstrong  MRN: [id number]   YOB: 1934    DATE OF ADMISSION: 12/14/2022  2:06 PM    DATE OF DISCHARGE: 12/16/22    PRIMARY CARE PROVIDER: Roman Roberts MD     ATTENDING PHYSICIAN: Kassie Ch MD  DISCHARGING PROVIDER: Kassie Ch MD        CONSULTATIONS: IP CONSULT TO CARDIOLOGY    PROCEDURES/SURGERIES: * No surgery found *    ADMITTING [address] COURSE:   Lashell Armstrong is a 80 y.o. female with a past medical history significant for CHF (unknown EF), HTN, HLP, atrial fibrillation (on Xarelto), asthma, dementia, hypothyroidism and gout who [place] complaints of upper respiratory symptoms in the last 1 week. History is obtained from patient, and supplemented by her daughter who was present at bedside. Daughter reports that patient had been coughing in the last 1 week. She has also been having runny noses which she also attributes to \"my seasonal allergies\". Per daughter, cough worsened in the last 3 days with associated wheezing, she was started on cough medicine and some inhalers but without any effectiveness. No febrile episodes reported. No nausea, or vomit. Patient denies any weakness or body aches. States \" I am just tired\". Patient also complains of chest tightness associated with coughing 3 days ago but not at the moment. Due to worsening cough and wheezing she was sent over to the ED. In the ED, a chest x-ray shows mild pulmonary vascular congestion with small bilateral pleural effusions. Her proBNP was 3511, afebrile no tachycardia, not hypoxic, Hospitalist was asked to admit. DISCHARGE DIAGNOSES / PLAN:      Assessment & Plan:      #1: Asthma Exacerbation:  -preswnts with 1 week episode of cough and wheezing, with rhinorrhea, and \"allegies\".   -s/p Mag sulfate and albuterol in ED  -admit to inpatient, monitor on tele  -cont duoneb q 4 hrs  -start solumedrol 60mg q 6 hr-  -cont montelukast, symbicort, per home dose  -Afebrile, no leukocytosis, influenza panel and rapid covid tests are negative  -trend lactic.   -mucinex 600mg bid  -cont O2 supp prn to keep sats>95%  -consider 6-minute walk at discharge. #2: Acute on Chronic Congestive Heart Failure:  -unknown EF, her trop is negative, I will trend trop,   -Ecg-A. fib/71, Qtc 420,   -check Echocardiogram  -start lasix 40mg IV daily.  -cont O2 supp  -CXR shows mild vascular congestion, with small bilateral pleural effusions (L>R). Her pro-bnp is 3511.  -daily weights, I&Os, 1500ml fluid restriction  -cont her low dose lisinopril.  -Cardiology consultation. #3: Atrial fibrillation:  -chronic issue, controlled rates. -cont metoprolol, and Xarelto. #4: Hypothyroidism:  -cont [place] Thyroid per home dose. #5: Hypertension:  -chronic issue, well controlled. -Cont lisinopril and metoprolol. #5: Dementia:  -cont [place]. #6: Gout:  -cont allopurinol        Pt is still requiring 02, still actively wheezing, ok at rest, very sob with activity. Cont aggressive treatgment with steriods and nebs, cont gentle diuresis    Day of dc  Echo with ef 43%- cont ace, change metoprolol formulation to toprol xl for GDMT  Take medrol dose brett for asthma exacerbation, already has duonebs and nebulizer  Has lasix ordered prn prior to admission  Pt did well on 6 min walk, no 02 needed      FOLLOW UP APPOINTMENTS:    Follow-up Information       Follow up With Specialties Details Why Contact Info    Stacie Marquez MD Internal Medicine Physician   [address] [facility] [address] [phone]                 DIET: Cardiac Diet        DISCHARGE MEDICATIONS:  Current Discharge Medication List        START taking these medications    Details   metoprolol succinate (Toprol XL) 50 mg XL tablet Take 1 Tablet by mouth daily.   Qty: 30 Tablet, Refills: 0  Start date: 12/16/2022      methylPREDNISolone (Medrol, Brett,) 4 mg tablet Take as directed on packaging. Qty: 1 Dose Pack, Refills: 0  Start date: 12/16/2022           CONTINUE these medications which have CHANGED    Details   albuterol (PROVENTIL VENTOLIN) 2.5 mg /3 mL (0.083 %) nebu 3 mL by Nebulization route every four (4) hours as needed for Wheezing, Shortness of Breath or Respiratory Distress. Qty: 60 Each, Refills: 0  Start date: 12/16/2022           CONTINUE these medications which have NOT CHANGED    Details   d-mannose (AZO D-Mannose) 500 mg cap Take 2 Capsules by mouth daily. lisinopriL (PRINIVIL, ZESTRIL) 2.5 mg tablet Take 2.5 mg by mouth daily. ergocalciferol (ERGOCALCIFEROL) 1,250 mcg (50,000 unit) capsule Take 50,000 Units by mouth every seven (7) days. rivaroxaban (Xarelto) 15 mg tab tablet Take 15 mg by mouth daily. acetaminophen (TYLENOL) 500 mg tablet Take 1,000 mg by mouth every eight (8) hours as needed for Pain or Fever. loperamide (Anti-Diarrheal, Loperamide,) 2 mg tablet Take 2 mg by mouth daily as needed for Diarrhea. EPINEPHrine (EPIPEN) 0.3 mg/0.3 mL injection 0.3 mg by IntraMUSCular route once as needed for Allergic Response. furosemide (LASIX) 20 mg tablet Take  by mouth daily as needed for PRN Reason (Other) (edema). guaiFENesin ER (Mucinex) 600 mg ER tablet Take 600 mg by mouth two (2) times daily as needed for Congestion. neomycin-bacitracin-polymyxin (Triple Antibiotic) 3.5mg-400 unit- 5,000 unit/gram ointment Apply  to affected area daily as needed. Any skin tear or scrape      zolpidem (AMBIEN) 5 mg tablet Take 5 mg by mouth nightly as needed for Sleep. Saccharomyces boulardii (FLORASTOR) 250 mg capsule Take 500 mg by mouth two (2) times a day. albuterol (PROVENTIL HFA, VENTOLIN HFA, PROAIR HFA) 90 mcg/actuation inhaler Take 1-2 Puffs by inhalation every four (4) hours as needed for Wheezing. Qty: 1 g, Refills: 0      allopurinoL (ZYLOPRIM) 300 mg tablet Take 300 mg by mouth daily.       thyroid, Pork, (ARMOUR) 60 mg tablet Take 60 mg by mouth daily. cyanocobalamin 1,000 mcg tablet Take 2,000 mcg by mouth daily. montelukast (SINGULAIR) 10 mg tablet Take 10 mg by mouth daily. cetirizine (ZYRTEC) 10 mg tablet Take 10 mg by mouth nightly. Symbicort 160-4.5 mcg/actuation HFAA Take 2 Puffs by inhalation two (2) times a day. memantine (NAMENDA) 5 mg tablet TAKE 1 TABLET BY MOUTH EVERY DAY FOR 7 DAYS THEN 1 TABLET BY MOUTH TWICE DAILY      sertraline (ZOLOFT) 50 mg tablet Take 50 mg by mouth daily. STOP taking these medications       metoprolol tartrate (LOPRESSOR) 50 mg tablet Comments:   Reason for Stopping:                 NOTIFY YOUR PHYSICIAN FOR ANY OF THE FOLLOWING:   Fever over 101 degrees for 24 hours. Chest pain, shortness of breath, fever, chills, nausea, vomiting, diarrhea, change in mentation, falling, weakness, bleeding. Severe pain or pain not relieved by medications. Or, any other signs or symptoms that you may have questions about. DISPOSITION:assisted living    Home With:   OT  PT  [address] [facility]    Independent/assisted living    Hospice    Other:       PATIENT CONDITION AT DISCHARGE:     Functional status    Poor     Deconditioned    x Independent      Cognition     Lucid    x Forgetful     Dementia      Catheters/lines (plus indication)    Whitaker     PICC     PEG    x None      Code status     Full code    x DNR      PHYSICAL EXAMINATION AT DISCHARGE:  General:          Alert, cooperative, no distress, appears stated age.    + weak, frail, white female  HEENT:           Atraumatic, anicteric sclerae, pink conjunctivae                          No oral ulcers, mucosa moist, throat clear, dentition fair  Neck:               Supple, symmetrical  Lungs:             wheeze  Chest wall:      No tenderness  No Accessory muscle use. Heart:              Regular  rhythm,  No  murmur   No edema  Abdomen:        Soft, non-tender. Not distended.   Bowel sounds normal  Extremities:     No cyanosis. No clubbing,                            Skin turgor normal, Capillary refill normal  Skin:                Not pale. Not Jaundiced  No rashes   Psych:             Not anxious or agitated.   Neurologic:      Alert, moves all extremities, answers questions appropriately and responds to commands       CHRONIC MEDICAL DIAGNOSES:  Problem List as of 12/16/2022 Date Reviewed: 10/3/2021            Codes Class Noted - Resolved    Asthma attack ICD-10-CM: J45.901  ICD-9-CM: 493.92  12/14/2022 - Present        Status asthmaticus with COPD (chronic obstructive pulmonary disease) ([facility] ICD-10-CM: J44.9, J45.902  ICD-9-CM: 493.21  12/14/2022 - Present        Dementia ([facility] ICD-10-CM: F03.90  ICD-9-CM: 294.20  10/3/2021 - Present        Asthma ICD-10-CM: J45.909  ICD-9-CM: 493.90  10/3/2021 - Present        Ill-defined condition ICD-10-CM: R69  ICD-9-CM: 799.89  10/3/2021 - Present        Hypertension ICD-10-CM: I10  ICD-9-CM: 401.9  10/3/2021 - Present           Greater than 35 minutes were spent with the patient on counseling and coordination of care    Signed:   Riley Bello MD  12/16/2022  9:24 AM

## 2022-12-16 NOTE — PROGRESS NOTES
No events during shift    Patient assessment  Pt received am medications  Nursing supervisor completed dc paperwork  Tech gave am bathed to patient   [address] [facility] cardiology to call patient daughter (vinita) to review echo  1020-pt dc at this time.

## 2022-12-29 ENCOUNTER — HOSPITAL ENCOUNTER (EMERGENCY)
Age: 87
Discharge: ANOTHER HOSPITAL | End: 2022-12-29
Attending: EMERGENCY MEDICINE
Payer: MEDICARE

## 2022-12-29 ENCOUNTER — HOSPITAL ENCOUNTER (INPATIENT)
Age: 87
LOS: 6 days | Discharge: HOSPICE | DRG: 480 | End: 2023-01-04
Attending: STUDENT IN AN ORGANIZED HEALTH CARE EDUCATION/TRAINING PROGRAM | Admitting: INTERNAL MEDICINE
Payer: MEDICARE

## 2022-12-29 ENCOUNTER — APPOINTMENT (OUTPATIENT)
Dept: CT IMAGING | Age: 87
End: 2022-12-29
Attending: EMERGENCY MEDICINE
Payer: MEDICARE

## 2022-12-29 ENCOUNTER — APPOINTMENT (OUTPATIENT)
Dept: GENERAL RADIOLOGY | Age: 87
End: 2022-12-29
Attending: EMERGENCY MEDICINE
Payer: MEDICARE

## 2022-12-29 VITALS
HEIGHT: 71 IN | RESPIRATION RATE: 14 BRPM | TEMPERATURE: 97.8 F | BODY MASS INDEX: 22.4 KG/M2 | SYSTOLIC BLOOD PRESSURE: 126 MMHG | HEART RATE: 100 BPM | WEIGHT: 160 LBS | DIASTOLIC BLOOD PRESSURE: 51 MMHG | OXYGEN SATURATION: 96 %

## 2022-12-29 DIAGNOSIS — S72.001A CLOSED FRACTURE OF RIGHT HIP, INITIAL ENCOUNTER (HCC): Primary | ICD-10-CM

## 2022-12-29 DIAGNOSIS — J18.9 COMMUNITY ACQUIRED PNEUMONIA, UNSPECIFIED LATERALITY: ICD-10-CM

## 2022-12-29 DIAGNOSIS — R31.9 URINARY TRACT INFECTION WITH HEMATURIA, SITE UNSPECIFIED: ICD-10-CM

## 2022-12-29 DIAGNOSIS — N39.0 URINARY TRACT INFECTION WITH HEMATURIA, SITE UNSPECIFIED: ICD-10-CM

## 2022-12-29 DIAGNOSIS — N30.00 ACUTE CYSTITIS WITHOUT HEMATURIA: ICD-10-CM

## 2022-12-29 PROBLEM — S72.009A HIP FRACTURE (HCC): Status: ACTIVE | Noted: 2022-12-29

## 2022-12-29 LAB
ABO + RH BLD: NORMAL
ALBUMIN SERPL-MCNC: 3 G/DL (ref 3.4–5)
ALBUMIN/GLOB SERPL: 1 {RATIO} (ref 0.8–1.7)
ALP SERPL-CCNC: 85 U/L (ref 45–117)
ALT SERPL-CCNC: 23 U/L (ref 13–56)
ANION GAP SERPL CALC-SCNC: 8 MMOL/L (ref 3–18)
APPEARANCE UR: ABNORMAL
AST SERPL W P-5'-P-CCNC: 20 U/L (ref 10–38)
BACTERIA URNS QL MICRO: ABNORMAL /HPF
BASOPHILS # BLD: 0.1 K/UL (ref 0–0.1)
BASOPHILS NFR BLD: 1 % (ref 0–2)
BILIRUB SERPL-MCNC: 0.5 MG/DL (ref 0.2–1)
BILIRUB UR QL: NEGATIVE
BLOOD GROUP ANTIBODIES SERPL: NEGATIVE
BNP SERPL-MCNC: 1258 PG/ML (ref 0–1800)
BUN SERPL-MCNC: 25 MG/DL (ref 7–18)
BUN/CREAT SERPL: 32 (ref 12–20)
CA-I BLD-MCNC: 8.7 MG/DL (ref 8.5–10.1)
CHLORIDE SERPL-SCNC: 101 MMOL/L (ref 100–111)
CO2 SERPL-SCNC: 29 MMOL/L (ref 21–32)
COLOR UR: YELLOW
COVID-19 RAPID TEST, COVR: NOT DETECTED
CREAT SERPL-MCNC: 0.78 MG/DL (ref 0.6–1.3)
DIFFERENTIAL METHOD BLD: ABNORMAL
EOSINOPHIL # BLD: 0.2 K/UL (ref 0–0.4)
EOSINOPHIL NFR BLD: 2 % (ref 0–5)
EPITH CASTS URNS QL MICRO: ABNORMAL /LPF (ref 0–20)
ERYTHROCYTE [DISTWIDTH] IN BLOOD BY AUTOMATED COUNT: 14.9 % (ref 11.6–14.5)
GLOBULIN SER CALC-MCNC: 3 G/DL (ref 2–4)
GLUCOSE SERPL-MCNC: 147 MG/DL (ref 74–99)
GLUCOSE UR STRIP.AUTO-MCNC: NEGATIVE MG/DL
HCT VFR BLD AUTO: 38 % (ref 35–45)
HGB BLD-MCNC: 12.4 G/DL (ref 12–16)
HGB UR QL STRIP: NEGATIVE
IMM GRANULOCYTES # BLD AUTO: 0.1 K/UL (ref 0–0.04)
IMM GRANULOCYTES NFR BLD AUTO: 1 % (ref 0–0.5)
INR PPP: 1 (ref 0.8–1.2)
KETONES UR QL STRIP.AUTO: NEGATIVE MG/DL
LACTATE SERPL-SCNC: 1.2 MMOL/L (ref 0.4–2)
LEUKOCYTE ESTERASE UR QL STRIP.AUTO: ABNORMAL
LYMPHOCYTES # BLD: 2.3 K/UL (ref 0.9–3.6)
LYMPHOCYTES NFR BLD: 23 % (ref 21–52)
MCH RBC QN AUTO: 29.1 PG (ref 24–34)
MCHC RBC AUTO-ENTMCNC: 32.6 G/DL (ref 31–37)
MCV RBC AUTO: 89.2 FL (ref 78–100)
MONOCYTES # BLD: 0.7 K/UL (ref 0.05–1.2)
MONOCYTES NFR BLD: 7 % (ref 3–10)
NEUTS SEG # BLD: 6.6 K/UL (ref 1.8–8)
NEUTS SEG NFR BLD: 66 % (ref 40–73)
NITRITE UR QL STRIP.AUTO: NEGATIVE
NRBC # BLD: 0 K/UL (ref 0–0.01)
NRBC BLD-RTO: 0 PER 100 WBC
PH UR STRIP: 6 [PH] (ref 5–8)
PLATELET # BLD AUTO: 190 K/UL (ref 135–420)
PMV BLD AUTO: 9.9 FL (ref 9.2–11.8)
POTASSIUM SERPL-SCNC: 4.5 MMOL/L (ref 3.5–5.5)
PROT SERPL-MCNC: 6 G/DL (ref 6.4–8.2)
PROT UR STRIP-MCNC: NEGATIVE MG/DL
PROTHROMBIN TIME: 13.6 SEC (ref 11.5–15.2)
RBC # BLD AUTO: 4.26 M/UL (ref 4.2–5.3)
RBC #/AREA URNS HPF: ABNORMAL /HPF (ref 0–2)
SODIUM SERPL-SCNC: 138 MMOL/L (ref 136–145)
SP GR UR REFRACTOMETRY: 1.02 (ref 1–1.03)
SPECIMEN EXP DATE BLD: NORMAL
TROPONIN-HIGH SENSITIVITY: 20 NG/L (ref 0–54)
UROBILINOGEN UR QL STRIP.AUTO: 0.2 EU/DL (ref 0.2–1)
WBC # BLD AUTO: 9.9 K/UL (ref 4.6–13.2)
WBC URNS QL MICRO: ABNORMAL /HPF (ref 0–4)

## 2022-12-29 PROCEDURE — 99285 EMERGENCY DEPT VISIT HI MDM: CPT

## 2022-12-29 PROCEDURE — 36415 COLL VENOUS BLD VENIPUNCTURE: CPT

## 2022-12-29 PROCEDURE — 87635 SARS-COV-2 COVID-19 AMP PRB: CPT

## 2022-12-29 PROCEDURE — 74011250637 HC RX REV CODE- 250/637: Performed by: PHYSICIAN ASSISTANT

## 2022-12-29 PROCEDURE — 74011250636 HC RX REV CODE- 250/636: Performed by: INTERNAL MEDICINE

## 2022-12-29 PROCEDURE — 85025 COMPLETE CBC W/AUTO DIFF WBC: CPT

## 2022-12-29 PROCEDURE — 83605 ASSAY OF LACTIC ACID: CPT

## 2022-12-29 PROCEDURE — 96376 TX/PRO/DX INJ SAME DRUG ADON: CPT

## 2022-12-29 PROCEDURE — 65270000029 HC RM PRIVATE

## 2022-12-29 PROCEDURE — 86900 BLOOD TYPING SEROLOGIC ABO: CPT

## 2022-12-29 PROCEDURE — 85610 PROTHROMBIN TIME: CPT

## 2022-12-29 PROCEDURE — 87040 BLOOD CULTURE FOR BACTERIA: CPT

## 2022-12-29 PROCEDURE — 74011000258 HC RX REV CODE- 258: Performed by: INTERNAL MEDICINE

## 2022-12-29 PROCEDURE — 94640 AIRWAY INHALATION TREATMENT: CPT

## 2022-12-29 PROCEDURE — 96375 TX/PRO/DX INJ NEW DRUG ADDON: CPT

## 2022-12-29 PROCEDURE — 83880 ASSAY OF NATRIURETIC PEPTIDE: CPT

## 2022-12-29 PROCEDURE — 70450 CT HEAD/BRAIN W/O DYE: CPT

## 2022-12-29 PROCEDURE — 74011000250 HC RX REV CODE- 250: Performed by: INTERNAL MEDICINE

## 2022-12-29 PROCEDURE — 96374 THER/PROPH/DIAG INJ IV PUSH: CPT

## 2022-12-29 PROCEDURE — 74011250636 HC RX REV CODE- 250/636: Performed by: EMERGENCY MEDICINE

## 2022-12-29 PROCEDURE — 84484 ASSAY OF TROPONIN QUANT: CPT

## 2022-12-29 PROCEDURE — 71045 X-RAY EXAM CHEST 1 VIEW: CPT

## 2022-12-29 PROCEDURE — 72125 CT NECK SPINE W/O DYE: CPT

## 2022-12-29 PROCEDURE — 80053 COMPREHEN METABOLIC PANEL: CPT

## 2022-12-29 PROCEDURE — 81001 URINALYSIS AUTO W/SCOPE: CPT

## 2022-12-29 PROCEDURE — 74011250637 HC RX REV CODE- 250/637: Performed by: INTERNAL MEDICINE

## 2022-12-29 PROCEDURE — 51702 INSERT TEMP BLADDER CATH: CPT

## 2022-12-29 PROCEDURE — 73502 X-RAY EXAM HIP UNI 2-3 VIEWS: CPT

## 2022-12-29 RX ORDER — CEFTRIAXONE 1 G/1
1 INJECTION, POWDER, FOR SOLUTION INTRAMUSCULAR; INTRAVENOUS
Status: COMPLETED | OUTPATIENT
Start: 2022-12-29 | End: 2022-12-29

## 2022-12-29 RX ORDER — TRAMADOL HYDROCHLORIDE 50 MG/1
50 TABLET ORAL
Status: DISCONTINUED | OUTPATIENT
Start: 2022-12-29 | End: 2022-12-30

## 2022-12-29 RX ORDER — ACETAMINOPHEN 325 MG/1
650 TABLET ORAL
Status: DISCONTINUED | OUTPATIENT
Start: 2022-12-29 | End: 2022-12-29

## 2022-12-29 RX ORDER — ENOXAPARIN SODIUM 100 MG/ML
40 INJECTION SUBCUTANEOUS DAILY
Status: DISCONTINUED | OUTPATIENT
Start: 2022-12-30 | End: 2022-12-29

## 2022-12-29 RX ORDER — MORPHINE SULFATE 4 MG/ML
4 INJECTION, SOLUTION INTRAMUSCULAR; INTRAVENOUS
Status: COMPLETED | OUTPATIENT
Start: 2022-12-29 | End: 2022-12-29

## 2022-12-29 RX ORDER — MONTELUKAST SODIUM 10 MG/1
10 TABLET ORAL DAILY
Status: DISCONTINUED | OUTPATIENT
Start: 2022-12-30 | End: 2023-01-04 | Stop reason: HOSPADM

## 2022-12-29 RX ORDER — ERGOCALCIFEROL 1.25 MG/1
50000 CAPSULE ORAL
Status: DISCONTINUED | OUTPATIENT
Start: 2022-12-29 | End: 2023-01-04 | Stop reason: HOSPADM

## 2022-12-29 RX ORDER — MORPHINE SULFATE 2 MG/ML
2 INJECTION, SOLUTION INTRAMUSCULAR; INTRAVENOUS
Status: DISCONTINUED | OUTPATIENT
Start: 2022-12-29 | End: 2022-12-29

## 2022-12-29 RX ORDER — MEMANTINE HYDROCHLORIDE 10 MG/1
5 TABLET ORAL DAILY
Status: DISCONTINUED | OUTPATIENT
Start: 2022-12-30 | End: 2023-01-04 | Stop reason: HOSPADM

## 2022-12-29 RX ORDER — DIPHENHYDRAMINE HYDROCHLORIDE 50 MG/ML
25 INJECTION, SOLUTION INTRAMUSCULAR; INTRAVENOUS ONCE
Status: COMPLETED | OUTPATIENT
Start: 2022-12-29 | End: 2022-12-29

## 2022-12-29 RX ORDER — DOXYCYCLINE 100 MG/1
100 CAPSULE ORAL 2 TIMES DAILY
Status: ON HOLD | COMMUNITY

## 2022-12-29 RX ORDER — ALBUTEROL SULFATE 2.5 MG/.5ML
2.5 SOLUTION RESPIRATORY (INHALATION)
Status: DISCONTINUED | OUTPATIENT
Start: 2022-12-29 | End: 2023-01-04 | Stop reason: HOSPADM

## 2022-12-29 RX ORDER — L. ACIDOPHILUS/PECTIN, CITRUS 25MM-100MG
1 TABLET ORAL 2 TIMES DAILY
Status: DISCONTINUED | OUTPATIENT
Start: 2022-12-31 | End: 2023-01-04 | Stop reason: HOSPADM

## 2022-12-29 RX ORDER — MORPHINE SULFATE 2 MG/ML
1 INJECTION, SOLUTION INTRAMUSCULAR; INTRAVENOUS
Status: DISCONTINUED | OUTPATIENT
Start: 2022-12-29 | End: 2022-12-30

## 2022-12-29 RX ORDER — ONDANSETRON 2 MG/ML
4 INJECTION INTRAMUSCULAR; INTRAVENOUS
Status: COMPLETED | OUTPATIENT
Start: 2022-12-29 | End: 2022-12-29

## 2022-12-29 RX ORDER — LANOLIN ALCOHOL/MO/W.PET/CERES
2000 CREAM (GRAM) TOPICAL DAILY
Status: DISCONTINUED | OUTPATIENT
Start: 2022-12-30 | End: 2023-01-04 | Stop reason: HOSPADM

## 2022-12-29 RX ORDER — ALLOPURINOL 100 MG/1
300 TABLET ORAL DAILY
Status: DISCONTINUED | OUTPATIENT
Start: 2022-12-30 | End: 2023-01-04 | Stop reason: HOSPADM

## 2022-12-29 RX ORDER — LISINOPRIL 5 MG/1
2.5 TABLET ORAL DAILY
Status: DISCONTINUED | OUTPATIENT
Start: 2022-12-30 | End: 2023-01-04 | Stop reason: HOSPADM

## 2022-12-29 RX ORDER — SERTRALINE HYDROCHLORIDE 50 MG/1
50 TABLET, FILM COATED ORAL DAILY
Status: DISCONTINUED | OUTPATIENT
Start: 2022-12-30 | End: 2023-01-04 | Stop reason: HOSPADM

## 2022-12-29 RX ORDER — POLYETHYLENE GLYCOL 3350 17 G/17G
17 POWDER, FOR SOLUTION ORAL DAILY PRN
Status: DISCONTINUED | OUTPATIENT
Start: 2022-12-29 | End: 2023-01-02

## 2022-12-29 RX ORDER — FUROSEMIDE 40 MG/1
20 TABLET ORAL DAILY
Status: DISCONTINUED | OUTPATIENT
Start: 2022-12-30 | End: 2023-01-04 | Stop reason: HOSPADM

## 2022-12-29 RX ORDER — SODIUM CHLORIDE 900 MG/100ML
INJECTION INTRAVENOUS
Status: DISCONTINUED
Start: 2022-12-29 | End: 2022-12-29 | Stop reason: HOSPADM

## 2022-12-29 RX ORDER — SODIUM CHLORIDE 0.9 % (FLUSH) 0.9 %
5-40 SYRINGE (ML) INJECTION EVERY 8 HOURS
Status: DISCONTINUED | OUTPATIENT
Start: 2022-12-29 | End: 2023-01-04 | Stop reason: HOSPADM

## 2022-12-29 RX ORDER — SODIUM CHLORIDE 0.9 % (FLUSH) 0.9 %
5-40 SYRINGE (ML) INJECTION AS NEEDED
Status: DISCONTINUED | OUTPATIENT
Start: 2022-12-29 | End: 2023-01-04 | Stop reason: HOSPADM

## 2022-12-29 RX ORDER — LEVOTHYROXINE AND LIOTHYRONINE 9.5; 2.25 UG/1; UG/1
60 TABLET ORAL DAILY
Status: DISCONTINUED | OUTPATIENT
Start: 2022-12-30 | End: 2023-01-04 | Stop reason: HOSPADM

## 2022-12-29 RX ORDER — METOPROLOL SUCCINATE 50 MG/1
50 TABLET, EXTENDED RELEASE ORAL DAILY
Status: DISCONTINUED | OUTPATIENT
Start: 2022-12-30 | End: 2023-01-04 | Stop reason: HOSPADM

## 2022-12-29 RX ORDER — ENOXAPARIN SODIUM 100 MG/ML
40 INJECTION SUBCUTANEOUS EVERY 24 HOURS
Status: ACTIVE | OUTPATIENT
Start: 2022-12-29 | End: 2022-12-30

## 2022-12-29 RX ORDER — ONDANSETRON 2 MG/ML
4 INJECTION INTRAMUSCULAR; INTRAVENOUS
Status: DISCONTINUED | OUTPATIENT
Start: 2022-12-29 | End: 2023-01-02

## 2022-12-29 RX ORDER — BUDESONIDE AND FORMOTEROL FUMARATE DIHYDRATE 160; 4.5 UG/1; UG/1
2 AEROSOL RESPIRATORY (INHALATION) 2 TIMES DAILY
Status: DISCONTINUED | OUTPATIENT
Start: 2022-12-29 | End: 2023-01-04 | Stop reason: HOSPADM

## 2022-12-29 RX ORDER — ONDANSETRON 4 MG/1
4 TABLET, ORALLY DISINTEGRATING ORAL
Status: DISCONTINUED | OUTPATIENT
Start: 2022-12-29 | End: 2023-01-02

## 2022-12-29 RX ORDER — CETIRIZINE HYDROCHLORIDE 10 MG/1
10 TABLET ORAL
Status: DISCONTINUED | OUTPATIENT
Start: 2022-12-29 | End: 2023-01-02

## 2022-12-29 RX ORDER — ACETAMINOPHEN 500 MG
1000 TABLET ORAL EVERY 6 HOURS
Status: DISCONTINUED | OUTPATIENT
Start: 2022-12-29 | End: 2023-01-04 | Stop reason: HOSPADM

## 2022-12-29 RX ADMIN — TRAMADOL HYDROCHLORIDE 50 MG: 50 TABLET, COATED ORAL at 17:40

## 2022-12-29 RX ADMIN — SODIUM CHLORIDE, PRESERVATIVE FREE 10 ML: 5 INJECTION INTRAVENOUS at 14:00

## 2022-12-29 RX ADMIN — ACETAMINOPHEN 1000 MG: 500 TABLET ORAL at 21:32

## 2022-12-29 RX ADMIN — MORPHINE SULFATE 4 MG: 4 INJECTION, SOLUTION INTRAMUSCULAR; INTRAVENOUS at 07:14

## 2022-12-29 RX ADMIN — ONDANSETRON 4 MG: 2 INJECTION INTRAMUSCULAR; INTRAVENOUS at 04:11

## 2022-12-29 RX ADMIN — CETIRIZINE HYDROCHLORIDE 10 MG: 10 TABLET, FILM COATED ORAL at 21:32

## 2022-12-29 RX ADMIN — AZITHROMYCIN MONOHYDRATE 500 MG: 500 INJECTION, POWDER, LYOPHILIZED, FOR SOLUTION INTRAVENOUS at 08:06

## 2022-12-29 RX ADMIN — CEFTRIAXONE 1 G: 1 INJECTION, POWDER, FOR SOLUTION INTRAMUSCULAR; INTRAVENOUS at 07:20

## 2022-12-29 RX ADMIN — BUDESONIDE AND FORMOTEROL FUMARATE DIHYDRATE 2 PUFF: 160; 4.5 AEROSOL RESPIRATORY (INHALATION) at 21:33

## 2022-12-29 RX ADMIN — SODIUM CHLORIDE, PRESERVATIVE FREE 10 ML: 5 INJECTION INTRAVENOUS at 21:33

## 2022-12-29 RX ADMIN — MORPHINE SULFATE 4 MG: 4 INJECTION, SOLUTION INTRAMUSCULAR; INTRAVENOUS at 04:17

## 2022-12-29 RX ADMIN — ACETAMINOPHEN 1000 MG: 500 TABLET ORAL at 17:39

## 2022-12-29 RX ADMIN — MEROPENEM 1 G: 1 INJECTION, POWDER, FOR SOLUTION INTRAVENOUS at 14:56

## 2022-12-29 RX ADMIN — MEROPENEM 1 G: 1 INJECTION, POWDER, FOR SOLUTION INTRAVENOUS at 21:32

## 2022-12-29 RX ADMIN — MORPHINE SULFATE 2 MG: 2 INJECTION, SOLUTION INTRAMUSCULAR; INTRAVENOUS at 14:57

## 2022-12-29 RX ADMIN — DIPHENHYDRAMINE HYDROCHLORIDE 25 MG: 50 INJECTION, SOLUTION INTRAMUSCULAR; INTRAVENOUS at 07:17

## 2022-12-29 NOTE — H&P
GENERAL GENERIC H&P/CONSULT  Presenting complaint:Fall    Subjective: 70-year-old female with past medical history multiple comorbidities presents to [facility] status post fall. Patient states earlier today she tripped and fell subsequently which she has had sudden onset intermittent sharp right hip pain, moderate to severe in intensity, aggravated with movement, alleviated with rest, following which patient presented to the ED. Patient denies any fevers chills nausea vomiting lightheadedness dizziness dyspnea orthopnea paroxysmal nocturnal dyspnea chest pain palpitations headache focal weakness loss sensation auditory or visual symptoms abdominal stool or urinary complaints or any other associated symptoms. Past Medical History:   Diagnosis Date    Anxiety     Arthropathy     Asthma     Atrial fibrillation (HCC)     CHF (congestive heart failure) ([facility]     Community acquired pneumonia     Dementia ([facility]     Gout     Hypertension     Ill-defined condition     dementia    Thyroid disease       Past Surgical History:   Procedure Laterality Date    HX ORTHOPAEDIC      left ankle orif    HX TONSILLECTOMY        Prior to Admission medications    Medication Sig Start Date End Date Taking? Authorizing Provider   doxycycline (MONODOX) 100 mg capsule Take 100 mg by mouth two (2) times a day. 12/28/2022 for 10 days    Other, MD Jena   albuterol (PROVENTIL VENTOLIN) 2.5 mg /3 mL (0.083 %) nebu 3 mL by Nebulization route every four (4) hours as needed for Wheezing, Shortness of Breath or Respiratory Distress. 12/16/22   Shahrzad Cosby MD   metoprolol succinate (Toprol XL) 50 mg XL tablet Take 1 Tablet by mouth daily. 12/16/22   Shahrzad Cosby MD   d-mannose (AZO D-Mannose) 500 mg cap Take 2 Capsules by mouth daily. Provider, Historical   lisinopriL (PRINIVIL, ZESTRIL) 2.5 mg tablet Take 2.5 mg by mouth daily.     Provider, Historical   ergocalciferol (ERGOCALCIFEROL) 1,250 mcg (50,000 unit) capsule Take 50,000 Units by mouth every seven (7) days. Provider, Historical   acetaminophen (TYLENOL) 500 mg tablet Take 1,000 mg by mouth every eight (8) hours as needed for Pain or Fever. Provider, Historical   loperamide (IMMODIUM) 2 mg tablet Take 2 mg by mouth daily as needed for Diarrhea. Provider, Historical   EPINEPHrine (EPIPEN) 0.3 mg/0.3 mL injection 0.3 mg by IntraMUSCular route once as needed for Allergic Response. Provider, Historical   furosemide (LASIX) 20 mg tablet Take  by mouth daily as needed for PRN Reason (Other) (edema). Provider, Historical   guaiFENesin ER (MUCINEX) 600 mg ER tablet Take 600 mg by mouth two (2) times daily as needed for Congestion. Provider, Historical   neomycin-bacitracin-polymyxin (Triple Antibiotic) 3.5mg-400 unit- 5,000 unit/gram ointment Apply  to affected area daily as needed. Any skin tear or scrape    Provider, Historical   zolpidem (AMBIEN) 5 mg tablet Take 5 mg by mouth nightly as needed for Sleep. Provider, Historical   Saccharomyces boulardii (FLORASTOR) 250 mg capsule Take 500 mg by mouth two (2) times a day. Bhavya, MD Jena   albuterol (PROVENTIL HFA, VENTOLIN HFA, PROAIR HFA) 90 mcg/actuation inhaler Take 1-2 Puffs by inhalation every four (4) hours as needed for Wheezing. 4/24/22   Melissa Vincent MD   allopurinoL (ZYLOPRIM) 300 mg tablet Take 300 mg by mouth daily. Jena Latif MD   thyroid, Pork, (ARMOUR) 60 mg tablet Take 60 mg by mouth daily. Jena Latif MD   cyanocobalamin 1,000 mcg tablet Take 2,000 mcg by mouth daily. Jena Latif MD   montelukast (SINGULAIR) 10 mg tablet Take 10 mg by mouth daily. Jena Latif MD   cetirizine (ZYRTEC) 10 mg tablet Take 10 mg by mouth nightly. Jena Latif MD   Symbicort 160-4.5 mcg/actuation HFAA Take 2 Puffs by inhalation two (2) times a day.  7/10/21   Provider, Historical   memantine (NAMENDA) 5 mg tablet TAKE 1 TABLET BY MOUTH EVERY DAY FOR 7 DAYS THEN 1 TABLET BY MOUTH TWICE DAILY 8/12/21   Provider, Historical   sertraline (ZOLOFT) 50 mg tablet Take 50 mg by mouth daily. 8/19/21   Provider, Historical     Allergies   Allergen Reactions    Cipro [Ciprofloxacin Hcl] Other (comments)    Ketek [Telithromycin] Other (comments)    Mobic [Meloxicam] Other (comments)    Penicillin Other (comments)    Shrimp Extract Other (comments)      Social History     Tobacco Use    Smoking status: Never    Smokeless tobacco: Never   Substance Use Topics    Alcohol use: Never      History reviewed. No pertinent family history. Review of Systems   Constitutional:  Positive for activity change, appetite change and fatigue. Negative for chills, diaphoresis, fever and unexpected weight change. HENT:  Negative for congestion, dental problem, drooling, ear discharge, ear pain, facial swelling, hearing loss, mouth sores, nosebleeds, postnasal drip, rhinorrhea, sinus pressure, sinus pain, sneezing, sore throat, tinnitus, trouble swallowing and voice change. Eyes:  Negative for photophobia, pain, discharge, redness, itching and visual disturbance. Respiratory:  Negative for apnea, cough, choking, chest tightness, shortness of breath, wheezing and stridor. Cardiovascular:  Negative for chest pain, palpitations and leg swelling. Gastrointestinal:  Negative for abdominal distention, abdominal pain, anal bleeding, blood in stool, constipation, diarrhea, nausea, rectal pain and vomiting. Endocrine: Negative for cold intolerance, heat intolerance, polydipsia, polyphagia and polyuria. Genitourinary:  Negative for decreased urine volume, difficulty urinating, dyspareunia, dysuria, enuresis, flank pain, frequency, genital sores, hematuria, menstrual problem, pelvic pain, urgency, vaginal bleeding, vaginal discharge and vaginal pain. Musculoskeletal:  Positive for joint swelling. Negative for arthralgias, back pain, gait problem, myalgias, neck pain and neck stiffness.    Skin:  Negative for color change, pallor, rash and wound. Allergic/Immunologic: Negative for environmental allergies, food allergies and immunocompromised state. Neurological:  Positive for weakness. Negative for dizziness, tremors, seizures, syncope, facial asymmetry, speech difficulty, light-headedness, numbness and headaches. Hematological:  Negative for adenopathy. Does not bruise/bleed easily. Psychiatric/Behavioral:  Negative for agitation, behavioral problems, confusion, decreased concentration, dysphoric mood, hallucinations, self-injury, sleep disturbance and suicidal ideas. The patient is not nervous/anxious and is not hyperactive. Objective:    No intake/output data recorded. No intake/output data recorded. Patient Vitals for the past 8 hrs:   BP Temp Pulse Resp SpO2   12/29/22 1230 (!) 109/51 98.2 °F (36.8 °C) 92 20 100 %   12/29/22 1052 -- -- -- -- 98 %   12/29/22 1001 126/71 98.6 °F (37 °C) 98 20 98 %     Physical Exam  Vitals reviewed. Constitutional:       General: She is not in acute distress. Appearance: She is normal weight. She is ill-appearing. She is not toxic-appearing or diaphoretic. HENT:      Head: Normocephalic and atraumatic. Nose: Nose normal. No congestion or rhinorrhea. Mouth/Throat:      Mouth: Mucous membranes are moist.      Pharynx: Oropharynx is clear. No oropharyngeal exudate or posterior oropharyngeal erythema. Eyes:      General: No scleral icterus. Right eye: No discharge. Left eye: No discharge. Extraocular Movements: Extraocular movements intact. Conjunctiva/sclera: Conjunctivae normal.      Pupils: Pupils are equal, round, and reactive to light. Neck:      Vascular: No carotid bruit. Cardiovascular:      Rate and Rhythm: Normal rate and regular rhythm. Pulses: Normal pulses. Heart sounds: Normal heart sounds. No murmur heard. No friction rub. No gallop.    Pulmonary:      Effort: Pulmonary effort is normal. No respiratory distress. Breath sounds: Normal breath sounds. No stridor. No wheezing, rhonchi or rales. Chest:      Chest wall: No tenderness. Abdominal:      General: Abdomen is flat. Bowel sounds are normal. There is no distension. Palpations: Abdomen is soft. There is no mass. Tenderness: There is no abdominal tenderness. There is no right CVA tenderness, left CVA tenderness, guarding or rebound. Hernia: No hernia is present. Musculoskeletal:         General: Swelling, tenderness, deformity and signs of injury present. Cervical back: Normal range of motion and neck supple. No rigidity or tenderness. Right lower leg: No edema. Left lower leg: No edema. Lymphadenopathy:      Cervical: No cervical adenopathy. Skin:     General: Skin is warm. Capillary Refill: Capillary refill takes less than 2 seconds. Coloration: Skin is not jaundiced or pale. Findings: No bruising, erythema, lesion or rash. Neurological:      General: No focal deficit present. Mental Status: She is alert and oriented to person, place, and time. Mental status is at baseline. Cranial Nerves: No cranial nerve deficit. Sensory: No sensory deficit. Motor: No weakness. Coordination: Coordination normal.      Gait: Gait normal.      Deep Tendon Reflexes: Reflexes normal.   Psychiatric:         Mood and Affect: Mood normal.         Behavior: Behavior normal.         Thought Content:  Thought content normal.         Judgment: Judgment normal.        Labs:    Recent Results (from the past 24 hour(s))   CBC WITH AUTOMATED DIFF    Collection Time: 12/29/22  3:26 AM   Result Value Ref Range    WBC 9.9 4.6 - 13.2 K/uL    RBC 4.26 4.20 - 5.30 M/uL    HGB 12.4 12.0 - 16.0 g/dL    HCT 38.0 35.0 - 45.0 %    MCV 89.2 78.0 - 100.0 FL    MCH 29.1 24.0 - 34.0 PG    MCHC 32.6 31.0 - 37.0 g/dL    RDW 14.9 (H) 11.6 - 14.5 %    PLATELET 093 219 - 178 K/uL    MPV 9.9 9.2 - 11.8 FL    NRBC 0.0 0.0  WBC    ABSOLUTE NRBC 0.00 0.00 - 0.01 K/uL    NEUTROPHILS 66 40 - 73 %    LYMPHOCYTES 23 21 - 52 %    MONOCYTES 7 3 - 10 %    EOSINOPHILS 2 0 - 5 %    BASOPHILS 1 0 - 2 %    IMMATURE GRANULOCYTES 1 (H) 0 - 0.5 %    ABS. NEUTROPHILS 6.6 1.8 - 8.0 K/UL    ABS. LYMPHOCYTES 2.3 0.9 - 3.6 K/UL    ABS. MONOCYTES 0.7 0.05 - 1.2 K/UL    ABS. EOSINOPHILS 0.2 0.0 - 0.4 K/UL    ABS. BASOPHILS 0.1 0.0 - 0.1 K/UL    ABS. IMM. GRANS. 0.1 (H) 0.00 - 0.04 K/UL    DF AUTOMATED     TYPE & SCREEN    Collection Time: 12/29/22  3:26 AM   Result Value Ref Range    Crossmatch Expiration 01/01/2023,2359     ABO/Rh(D) A Positive     Antibody screen Negative    PROTHROMBIN TIME + INR    Collection Time: 12/29/22  3:26 AM   Result Value Ref Range    Prothrombin time 13.6 11.5 - 15.2 sec    INR 1.0 0.8 - 1.2     METABOLIC PANEL, COMPREHENSIVE    Collection Time: 12/29/22  3:26 AM   Result Value Ref Range    Sodium 138 136 - 145 mmol/L    Potassium 4.5 3.5 - 5.5 mmol/L    Chloride 101 100 - 111 mmol/L    CO2 29 21 - 32 mmol/L    Anion gap 8 3.0 - 18.0 mmol/L    Glucose 147 (H) 74 - 99 mg/dL    BUN 25 (H) 7 - 18 mg/dL    Creatinine 0.78 0.60 - 1.30 mg/dL    BUN/Creatinine ratio 32 (H) 12 - 20      eGFR >60 >60 ml/min/1.73m2    Calcium 8.7 8.5 - 10.1 mg/dL    Bilirubin, total 0.5 0.2 - 1.0 mg/dL    AST (SGOT) 20 10 - 38 U/L    ALT (SGPT) 23 13 - 56 U/L    Alk.  phosphatase 85 45 - 117 U/L    Protein, total 6.0 (L) 6.4 - 8.2 g/dL    Albumin 3.0 (L) 3.4 - 5.0 g/dL    Globulin 3.0 2.0 - 4.0 g/dL    A-G Ratio 1.0 0.8 - 1.7     TROPONIN-HIGH SENSITIVITY    Collection Time: 12/29/22  3:26 AM   Result Value Ref Range    Troponin-High Sensitivity 20 0 - 54 ng/L   NT-PRO BNP    Collection Time: 12/29/22  3:26 AM   Result Value Ref Range    NT pro-BNP 1,258 0 - 1,800 pg/mL   LACTIC ACID    Collection Time: 12/29/22  3:26 AM   Result Value Ref Range    Lactic acid 1.2 0.4 - 2.0 mmol/L   URINALYSIS W/ RFLX MICROSCOPIC    Collection Time: 12/29/22  5:05 AM   Result Value Ref Range    Color Yellow      Appearance Cloudy      Specific gravity 1.017 1.005 - 1.030      pH (UA) 6.0 5.0 - 8.0      Protein Negative Negative mg/dL    Glucose Negative Negative mg/dL    Ketone Negative Negative mg/dL    Bilirubin Negative Negative      Blood Negative Negative      Urobilinogen 0.2 0.2 - 1.0 EU/dL    Nitrites Negative Negative      Leukocyte Esterase Large (A) Negative     URINE MICROSCOPIC    Collection Time: 12/29/22  5:05 AM   Result Value Ref Range    WBC 20-50 0 - 4 /hpf    RBC 0-5 0 - 2 /hpf    Epithelial cells Few 0 - 20 /lpf    Bacteria 2+ (A) None /hpf   COVID-19 RAPID TEST    Collection Time: 12/29/22  5:10 AM   Result Value Ref Range    COVID-19 rapid test Not Detected Not Detected         ECG: nonspecific ST and T waves changes   Right hip X-ray :IMPRESSION  1. Acute comminuted displaced intertrochanteric right femur fracture with joint  effusion and overlying soft tissue swelling. 2.  Recommend MR for further evaluation of associated injuries. Chest X-ray:IMPRESSION     1. New bilateral lung opacities concerning for multifocal pneumonia  superimposed on chronic lung disease. 2.  Cardiomegaly, atherosclerosis, and vascular congestion - correlate for  cardiac dysfunction.     Assessment:  Active Problems:    Hip fracture ([address] (12/29/2022)      UTI (urinary tract infection) (12/29/2022)        Plan:    Right hip fracture-patient presents with above and symptomatology found to have right hip fracture, secondary to traumatic impact status post mechanical fall  Obtain preoperative labs  Morphine as needed for pain relief  N.p.o. after midnight  Orthopedic surgery consult further evaluation    Urine tract infection-of note patient's urinalysis consistent with urinary tract infection, does not meet sepsis criteria at this time  Follow-up blood cultures  Follow-up urine cultures  Meropenem for broad-spectrum antibiotic coverage    Pneumonia-of note patient does have radiographic findings concerning for pneumonia, is not requiring supplemental oxygen, does not meet sepsis criteria  Obtain blood cultures  Obtain sputum cultures  Continue meropenem for antibiotic coverage  Obtain pulmonology consult further evaluation    Hypertension-continue current antihypertensive medications    Chronic gout-continue current medications    History of dementia-continue medications extending sign     Prophylaxis-Lovenox  FEN-cardiac diet, n.p.o. after midnight, replete potassium and magnesium  DNR, will clarify about surrogate decision-maker, admitted to surgical Telemetry for further management    Signed:   Abi Ruiz MD 12/29/2022

## 2022-12-29 NOTE — ED PROVIDER NOTES
26-year-old female past medical history of dementia congestive heart failure asthma who is a DNR presents the emergency department right hip pain. It is reported the patient was in her bed 10 minutes prior to being found on the ground. Patient has a right hip tenderness. She was transported by EMS. This was an unwitnessed fall. Patient is not a good historian. No other issues expressed. Past Medical History:   Diagnosis Date    Anxiety     Arthropathy     Asthma     Atrial fibrillation (HCC)     CHF (congestive heart failure) ([facility]     Community acquired pneumonia     D[facility] ([facility]     Gout     Hypertension     Ill-defined condition     dementia    Thyroid disease        Past Surgical History:   Procedure Laterality Date    HX ORTHOPAEDIC      left ankle orif    HX TONSILLECTOMY           History reviewed. No pertinent family history.     Social History     Socioeconomic History    Marital status:      Spouse name: Not on file    Number of children: Not on file    Years of education: Not on file    Highest education level: Not on file   Occupational History    Not on file   Tobacco Use    Smoking status: Never    Smokeless tobacco: Never   Substance and Sexual Activity    Alcohol use: Never    Drug use: Never    Sexual activity: Not on file   Other Topics Concern    Not on file   Social History Narrative    Not on file     Social Determinants of Health     Financial Resource Strain: Not on file   Food Insecurity: Not on file   Transportation Needs: Not on file   Physical Activity: Not on file   Stress: Not on file   Social Connections: Not on file   Intimate Partner Violence: Not on file   Housing Stability: Not on file         ALLERGIES: Cipro [ciprofloxacin hcl], Ketek [telithromycin], Mobic [meloxicam], Penicillin, and Shrimp extract    Review of Systems   Unable to perform ROS: Dementia     Vitals:    12/29/22 0324 12/29/22 0454 12/29/22 0609   BP: (!) 168/100 (!) 118/54 (!) 127/48   Pulse: 92 98 95   Resp: 20 12 15   Temp: 97.9 °F (36.6 °C)  97.8 °F (36.6 °C)   SpO2: 99% 93% 96%   Weight: 72.6 kg (160 lb)     Height: 5' 10.5\" (1.791 m)              Physical Exam  Vitals and nursing note reviewed. Constitutional:       General: She is not in acute distress. Appearance: Normal appearance. She is not ill-appearing, toxic-appearing or diaphoretic. HENT:      Head: Normocephalic and atraumatic. Nose: Nose normal. No congestion or rhinorrhea. Eyes:      Extraocular Movements: Extraocular movements intact. Cardiovascular:      Rate and Rhythm: Normal rate and regular rhythm. Pulmonary:      Effort: Pulmonary effort is normal.      Breath sounds: Normal breath sounds. Abdominal:      General: There is no distension. Palpations: Abdomen is soft. There is no mass. Tenderness: There is no abdominal tenderness. There is no right CVA tenderness, left CVA tenderness, guarding or rebound. Hernia: No hernia is present. Musculoskeletal:         General: Tenderness, deformity and signs of injury present. Normal range of motion. Cervical back: Normal range of motion and neck supple. Comments: Right hip tenderness good pulse, leg is shortened and externally rotated. Skin:     General: Skin is warm. Capillary Refill: Capillary refill takes less than 2 seconds. Coloration: Skin is not jaundiced or pale. Findings: No bruising, erythema, lesion or rash. Neurological:      General: No focal deficit present. Mental Status: She is alert. Mental status is at baseline. Psychiatric:         Mood and Affect: Mood normal.         Behavior: Behavior normal.        MDM  Number of Diagnoses or Management Options  Closed fracture of right hip, initial encounter ([facility]  Urinary tract infection with hematuria, site unspecified  Diagnosis management comments: Patient has hip fracture work-up is unremarkable otherwise she has a UTI will cover with Rocephin.   Head CT and neck are negative. Patient is poor historian. This is most likely secondary to dementia we will admit to [facility] we have an accepting doctor. He said ER to ER transfer. Differential diagnosis hip fracture dislocation musculoskeletal pain.        Amount and/or Complexity of Data Reviewed  Clinical lab tests: ordered and reviewed  Tests in the radiology section of CPT®: ordered and reviewed    Risk of Complications, Morbidity, and/or Mortality  Presenting problems: moderate  Diagnostic procedures: moderate  Management options: moderate    Patient Progress  Patient progress: stable         Procedures

## 2022-12-29 NOTE — ED NOTES
Verbal shift change report given to GOSIA Borges (oncoming nurse) by Corbin Saleem (offgoing nurse). Report included the following information SBAR, ED Summary, and Med Rec Status.

## 2022-12-29 NOTE — ED NOTES
Called to radiology to medicate patient due to increased pain and inability to complete ordered tests

## 2022-12-29 NOTE — CONSULTS
ORTHOPEDIC CONSULT    Patient: Ramonita Calixto MRN: [id number]  SSN: [id number]    YOB: 1934  Age: 80 y.o. Sex: female      Subjective:      Ramonita Calixto is a 80 y.o. female who is being seen in the emergency room for evaluation of a right hip fracture. The patient lives at a memory assisted living facility. The patient states she is in ambulator with limited mobility. She does use a Rollator as needed. She states she was getting out of bed to go to the bathroom when she slipped and fell causing injury to her right hip. She denies any dizziness or lightheadedness prior to falling. She is now complaining of moderate pain with any movement of her right lower extremity of her right hip. She denies any injury to head or cervical spine from a fall. She denies any headache or cervical neck pain at this time. She denies any numbness or ting of her right lower extremity. She denies any history of frequent falls. She states she did have a ankle fracture approximately 4 years ago of her left ankle. She denies any other musculoskeletal complaints at this time. Past Medical History:   Diagnosis Date    Anxiety     Arthropathy     Asthma     Atrial fibrillation (HCC)     CHF (congestive heart failure) ([facility]     Community acquired pneumonia     Dementia ([facility]     Gout     Hypertension     Ill-defined condition     dementia    Thyroid disease      Past Surgical History:   Procedure Laterality Date    HX ORTHOPAEDIC      left ankle orif    HX TONSILLECTOMY        History reviewed. No pertinent family history. Social History     Tobacco Use    Smoking status: Never    Smokeless tobacco: Never   Substance Use Topics    Alcohol use: Never      Prior to Admission medications    Medication Sig Start Date End Date Taking? Authorizing Provider   doxycycline (MONODOX) 100 mg capsule Take 100 mg by mouth two (2) times a day.  12/28/2022 for 10 days    Other, MD Jena   albuterol (PROVENTIL VENTOLIN) 2.5 mg /3 mL (0.083 %) nebu 3 mL by Nebulization route every four (4) hours as needed for Wheezing, Shortness of Breath or Respiratory Distress. 12/16/22   Jerardo Merida MD   metoprolol succinate (Toprol XL) 50 mg XL tablet Take 1 Tablet by mouth daily. 12/16/22   Jerardo Merida MD   d-mannose (AZO D-Mannose) 500 mg cap Take 2 Capsules by mouth daily. Provider, Historical   lisinopriL (PRINIVIL, ZESTRIL) 2.5 mg tablet Take 2.5 mg by mouth daily. Provider, Historical   ergocalciferol (ERGOCALCIFEROL) 1,250 mcg (50,000 unit) capsule Take 50,000 Units by mouth every seven (7) days. Provider, Historical   acetaminophen (TYLENOL) 500 mg tablet Take 1,000 mg by mouth every eight (8) hours as needed for Pain or Fever. Provider, Historical   loperamide (IMMODIUM) 2 mg tablet Take 2 mg by mouth daily as needed for Diarrhea. Provider, Historical   EPINEPHrine (EPIPEN) 0.3 mg/0.3 mL injection 0.3 mg by IntraMUSCular route once as needed for Allergic Response. Provider, Historical   furosemide (LASIX) 20 mg tablet Take  by mouth daily as needed for PRN Reason (Other) (edema). Provider, Historical   guaiFENesin ER (MUCINEX) 600 mg ER tablet Take 600 mg by mouth two (2) times daily as needed for Congestion. Provider, Historical   neomycin-bacitracin-polymyxin (Triple Antibiotic) 3.5mg-400 unit- 5,000 unit/gram ointment Apply  to affected area daily as needed. Any skin tear or scrape    Provider, Historical   zolpidem (AMBIEN) 5 mg tablet Take 5 mg by mouth nightly as needed for Sleep. Provider, Historical   Saccharomyces boulardii (FLORASTOR) 250 mg capsule Take 500 mg by mouth two (2) times a day. Other, MD Jena   albuterol (PROVENTIL HFA, VENTOLIN HFA, PROAIR HFA) 90 mcg/actuation inhaler Take 1-2 Puffs by inhalation every four (4) hours as needed for Wheezing. 4/24/22   Mark Gonzales MD   allopurinoL (ZYLOPRIM) 300 mg tablet Take 300 mg by mouth daily.     Jena Latif MD   thyroid, Pork, (ARMOUR) 60 mg tablet Take 60 mg by mouth daily. Other, MD Jena   cyanocobalamin 1,000 mcg tablet Take 2,000 mcg by mouth daily. Other, MD Jena   montelukast (SINGULAIR) 10 mg tablet Take 10 mg by mouth daily. Other, MD Jena   cetirizine (ZYRTEC) 10 mg tablet Take 10 mg by mouth nightly. Other, MD Jena   Symbicort 160-4.5 mcg/actuation HFAA Take 2 Puffs by inhalation two (2) times a day. 7/10/21   Provider, Historical   memantine (NAMENDA) 5 mg tablet TAKE 1 TABLET BY MOUTH EVERY DAY FOR 7 DAYS THEN 1 TABLET BY MOUTH TWICE DAILY 8/12/21   Provider, Historical   sertraline (ZOLOFT) 50 mg tablet Take 50 mg by mouth daily. 8/19/21   Provider, Historical       Allergies   Allergen Reactions    Cipro [Ciprofloxacin Hcl] Other (comments)    Ketek [Telithromycin] Other (comments)    Mobic [Meloxicam] Other (comments)    Penicillin Other (comments)    Shrimp Extract Other (comments)       Review of Systems:  Review of Systems   Constitutional: Negative. HENT: Negative. Eyes: Negative. Respiratory: Negative. Cardiovascular: Negative. Gastrointestinal: Negative. Genitourinary: Negative. Musculoskeletal:  Positive for joint pain. Right hip   Skin: Negative. Neurological: Negative. Endo/Heme/Allergies: Negative. Psychiatric/Behavioral: Negative. Objective:     Current Facility-Administered Medications   Medication Dose Route Frequency    meropenem (MERREM) 1 g in 0.9% sodium chloride (MBP/ADV) 50 mL MBP  1 g IntraVENous Q8H    morphine injection 2 mg  2 mg IntraVENous Q3H PRN    albuterol CONCENTRATE 2.5mg/0.5 mL neb soln  2.5 mg Nebulization Q4H PRN    [START ON 12/30/2022] allopurinoL (ZYLOPRIM) tablet 300 mg  300 mg Oral DAILY    cetirizine (ZYRTEC) tablet 10 mg  10 mg Oral QHS    [START ON 12/30/2022] cyanocobalamin tablet 2,000 mcg  2,000 mcg Oral DAILY    . PHARMACY TO SUBSTITUTE PER PROTOCOL (Reordered from: d-mannose (AZO D-Mannose) 500 mg cap)    Per Protocol    ergocalciferol capsule 50,000 Units  50,000 Units Oral Q7D    [START ON 12/30/2022] furosemide (LASIX) tablet 20 mg  20 mg Oral DAILY    [START ON 12/30/2022] lisinopriL (PRINIVIL, ZESTRIL) tablet 2.5 mg  2.5 mg Oral DAILY    [START ON 12/30/2022] memantine (NAMENDA) tablet 5 mg  5 mg Oral DAILY    [START ON 12/30/2022] metoprolol succinate (TOPROL-XL) XL tablet 50 mg  50 mg Oral DAILY    [START ON 12/30/2022] montelukast (SINGULAIR) tablet 10 mg  10 mg Oral DAILY    neomycin-bacitracin-polymyxin (NEOSPORIN) ointment   Topical QID    . PHARMACY TO SUBSTITUTE PER PROTOCOL (Reordered from: Saccharomyces boulardii (FLORASTOR) 250 mg capsule)    Per Protocol    [START ON 12/30/2022] sertraline (ZOLOFT) tablet 50 mg  50 mg Oral DAILY    budesonide-formoteroL (SYMBICORT) 160-4.5 mcg/actuation HFA inhaler 2 Puff  2 Puff Inhalation BID    [START ON 12/30/2022] thyroid (Pork) (ARMOUR) tablet 60 mg  60 mg Oral DAILY    sodium chloride (NS) flush 5-40 mL  5-40 mL IntraVENous Q8H    sodium chloride (NS) flush 5-40 mL  5-40 mL IntraVENous PRN    acetaminophen (TYLENOL) tablet 650 mg  650 mg Oral Q6H PRN    Or    acetaminophen (TYLENOL) suppository 650 mg  650 mg Rectal Q6H PRN    polyethylene glycol (MIRALAX) packet 17 g  17 g Oral DAILY PRN    ondansetron (ZOFRAN ODT) tablet 4 mg  4 mg Oral Q8H PRN    Or    ondansetron (ZOFRAN) injection 4 mg  4 mg IntraVENous Q6H PRN    [START ON 12/30/2022] enoxaparin (LOVENOX) injection 40 mg  40 mg SubCUTAneous DAILY     Current Outpatient Medications   Medication Sig    doxycycline (MONODOX) 100 mg capsule Take 100 mg by mouth two (2) times a day. 12/28/2022 for 10 days    albuterol (PROVENTIL VENTOLIN) 2.5 mg /3 mL (0.083 %) nebu 3 mL by Nebulization route every four (4) hours as needed for Wheezing, Shortness of Breath or Respiratory Distress. metoprolol succinate (Toprol XL) 50 mg XL tablet Take 1 Tablet by mouth daily.     d-mannose (AZO D-Mannose) 500 mg cap Take 2 Capsules by mouth daily. lisinopriL (PRINIVIL, ZESTRIL) 2.5 mg tablet Take 2.5 mg by mouth daily. ergocalciferol (ERGOCALCIFEROL) 1,250 mcg (50,000 unit) capsule Take 50,000 Units by mouth every seven (7) days. acetaminophen (TYLENOL) 500 mg tablet Take 1,000 mg by mouth every eight (8) hours as needed for Pain or Fever. loperamide (IMMODIUM) 2 mg tablet Take 2 mg by mouth daily as needed for Diarrhea. EPINEPHrine (EPIPEN) 0.3 mg/0.3 mL injection 0.3 mg by IntraMUSCular route once as needed for Allergic Response. furosemide (LASIX) 20 mg tablet Take  by mouth daily as needed for PRN Reason (Other) (edema). guaiFENesin ER (MUCINEX) 600 mg ER tablet Take 600 mg by mouth two (2) times daily as needed for Congestion. neomycin-bacitracin-polymyxin (Triple Antibiotic) 3.5mg-400 unit- 5,000 unit/gram ointment Apply  to affected area daily as needed. Any skin tear or scrape    zolpidem (AMBIEN) 5 mg tablet Take 5 mg by mouth nightly as needed for Sleep. Saccharomyces boulardii (FLORASTOR) 250 mg capsule Take 500 mg by mouth two (2) times a day. albuterol (PROVENTIL HFA, VENTOLIN HFA, PROAIR HFA) 90 mcg/actuation inhaler Take 1-2 Puffs by inhalation every four (4) hours as needed for Wheezing. allopurinoL (ZYLOPRIM) 300 mg tablet Take 300 mg by mouth daily. thyroid, Pork, (ARMOUR) 60 mg tablet Take 60 mg by mouth daily. cyanocobalamin 1,000 mcg tablet Take 2,000 mcg by mouth daily. montelukast (SINGULAIR) 10 mg tablet Take 10 mg by mouth daily. cetirizine (ZYRTEC) 10 mg tablet Take 10 mg by mouth nightly. Symbicort 160-4.5 mcg/actuation HFAA Take 2 Puffs by inhalation two (2) times a day. memantine (NAMENDA) 5 mg tablet TAKE 1 TABLET BY MOUTH EVERY DAY FOR 7 DAYS THEN 1 TABLET BY MOUTH TWICE DAILY    sertraline (ZOLOFT) 50 mg tablet Take 50 mg by mouth daily.       Vitals:    12/29/22 1052 12/29/22 1230 12/29/22 1430 12/29/22 1549   BP:  (!) 109/51 (!) 112/50 105/63   Pulse: 92 97 98   Resp:  20 20 20   Temp:  98.2 °F (36.8 °C) 97.6 °F (36.4 °C)    SpO2: 98% 100% 98% 98%        Alert and oriented x3, No apparent distress    Physical Exam:  Right lower extremity: There is foreshortening and external rotation seen of right lower extremity compared to the left. Skin is warm dry and intact throughout right lower extremity. No erythema ecchymosis seen throughout right lower extremity. There was mild tenderness palpation of her distal femur. As well as tenderness palpation along the greater trochanteric region of her right hip. Mild tenderness palpation along the proximal portion of her right tibia. No calf pain to palpation. DP/PT pulses are faint but palpable bilaterally. Cap refill is 2 seconds. EHL/DF/PF is 4 out of 5. Negative Homans. Right lower extremity neurovascular intact. Labs:  CBC:  Recent Labs     12/29/22 0326   WBC 9.9   RBC 4.26   HGB 12.4   HCT 38.0   MCV 89.2   RDW 14.9*        CHEMISTRIES:  Recent Labs     12/29/22 0326      K 4.5      CO2 29   BUN 25*   CREA 0.78   CA 8.7   PT/INR:  Recent Labs     12/29/22 0326   INR 1.0     APTT:No results for input(s): APTT in the last 72 hours. LIVER PROFILE:  Recent Labs     12/29/22 0326   AST 20   ALT 23       IMAGING:  X-rays taken of her right hip and pelvis show a displaced intertrochanteric fracture. Moderate osteopenia seen throughout pelvic girdle. CT scan taken of her cervical spine shows no acute fractures. Moderate degenerative changes seen throughout cervical spine.   Assessment/Plan:     Hospital Problems  Date Reviewed: 10/3/2021            Codes Class Noted POA    Hip fracture [facility]) ICD-10-CM: C12.302N  ICD-9-CM: 820.8  12/29/2022 Unknown        UTI (urinary tract infection) ICD-10-CM: N39.0  ICD-9-CM: 599.0  12/29/2022 Unknown       Intertrochanteric fracture right hip    Treatment options consisting of surgical intervention versus nonoperative conservative management were discussed with the patient and the family. The patient has been decided to proceed with surgical intervention. The procedure, risk and benefits were explained to the patient in detail including not limited to: Risk of infection, neurovascular injury, gait disturbance, leg length discrepancy, non-/malunion, wound dehiscence, risk of DVT, risk of pneumonia, possible need of blood transfusion related risk, reaction to anesthesia and possible death. The patient expressed full understanding agrees to proceed with surgical intervention. Emergency room work-up the patient has been found to possibly have a urinary tract infection and possible pneumonia. This will require further work-up prior to surgery. Pulmonology consult pending. Family member states that the patient at 1 point was on Xarelto but has stopped that sometime ago. We will plan for possible surgical intervention tomorrow if patient is medically stable to proceed. This patient was examined direct consult with Dr. Josr Arciniega. Thank you for the courtesy of this consult.     Signed By: Chito Colón PA-C     December 29, 2022

## 2022-12-29 NOTE — ROUTINE PROCESS
TRANSFER - OUT REPORT:    Verbal report given to huy on [address]  being transferred to [facility] for routine progression of care       Report consisted of patients Situation, Background, Assessment and   Recommendations(SBAR). Information from the following report(s) SBAR was reviewed with the receiving nurse. Lines:   Peripheral IV 12/29/22 Left (Active)        Opportunity for questions and clarification was provided.       Patient transported with:   [facility]

## 2022-12-29 NOTE — PROGRESS NOTES
Problem: Pressure Injury - Risk of  Goal: *Prevention of pressure injury  Description: Document Nirmal Scale and appropriate interventions in the flowsheet.   Outcome: Progressing Towards Goal  Note: Pressure Injury Interventions:                                            Problem: Patient Education: Go to Patient Education Activity  Goal: Patient/Family Education  Outcome: Progressing Towards Goal

## 2022-12-29 NOTE — ED TRIAGE NOTES
Received via EMS after fall in bathroom, patient states did not get dizzy. Lost footing and fell. Denies chest pian or dyspnea. Denies LOC  or hitting head. Pain in right hip.  Obvious shortening and rotation

## 2022-12-29 NOTE — PROGRESS NOTES
Reason for Admission:  Fx Hip                     RUR Score:  14%                   Plan for utilizing home health:   Daughter signed Choice Letter for SNF [facility]). Pt from [facility] [address] Referral sent via [address] Awaiting therapy evals. PCP: First and Last name:  Fredrick Romero MD     Name of Practice:    Are you a current patient: Yes/No: Yes   Approximate date of last visit: 12/2/.22   Can you participate in a virtual visit with your PCP: Yes, with assist.                      Current Advanced Directive/Advance Care Plan: DNR      Healthcare Decision Maker:              Primary Decision Maker: Gustavo Campbell - Daughter - [phone]                  Transition of Care Plan:                    D/C Plan is SNF ( [address]) via transportation upon discharge.

## 2022-12-29 NOTE — ED NOTES
Patient has been accepted at [facility] ER [facility] number for report [phone]  [facility] contacted for transport ETA approximately 1 Hour

## 2022-12-29 NOTE — ED NOTES
Have contacted [facility] to arrange transfer of patient for fracture of right hip. Family requests [facility] at this time [place] [id number] has no beds and [facility] is holding patients in double digits.  Will try [facility]  and [facility]

## 2022-12-29 NOTE — ROUTINE PROCESS
TRANSFER - OUT REPORT:    Verbal report given to Otis Elizalde RN on [address]  being transferred to [facility] ED for urgent transfer       Report consisted of patients Situation, Background, Assessment and   Recommendations(SBAR). Information from the following report(s) SBAR, ED Summary, MAR, and Cardiac Rhythm SR  was reviewed with the receiving nurse. Lines:   Saline Lock 12/29/22 Left Antecubital (Active)        Opportunity for questions and clarification was provided.       Patient transported with:   [id number]

## 2022-12-29 NOTE — ED PROVIDER NOTES
EMERGENCY DEPARTMENT HISTORY AND PHYSICAL EXAM      Date: 12/29/2022  Patient Name: Sarah Sparks    History of Presenting Illness     Chief Complaint   Patient presents with    Fall       History Provided By: Patient    HPI: Ashwin Lieberman, 80 y.o. female 80-year-old female with past medical history as reviewed below presents for evaluation of right hip pain. Patient was found by staff on the ground near her bed. She reports right hip pain and has not been able to ambulate since the fall. No other injuries noted in the fall. She denies head strike or loss of consciousness. She is transferred from an outside hospital where she was diagnosed with a right hip fracture. She was also diagnosed with a UTI and community-acquired pneumonia. She denies any dyspnea, fevers or chills, no dysuria or urinary frequency. There are no other complaints, changes, or physical findings at this time. Past History     Past Medical History:  Past Medical History:   Diagnosis Date    Anxiety     Arthropathy     Asthma     Atrial fibrillation (HCC)     CHF (congestive heart failure) ([facility] [address]     Community acquired pneumonia     Dementia ([facility] [address]     Gout     Hypertension     Ill-defined condition     dementia    Thyroid disease        Past Surgical History:  Past Surgical History:   Procedure Laterality Date    HX ORTHOPAEDIC      left ankle orif    HX TONSILLECTOMY         Family History:  History reviewed. No pertinent family history. Social History:  Social History     Tobacco Use    Smoking status: Never    Smokeless tobacco: Never   Substance Use Topics    Alcohol use: Never    Drug use: Never       Allergies:   Allergies   Allergen Reactions    Cipro [Ciprofloxacin Hcl] Other (comments)    Ketek [Telithromycin] Other (comments)    Mobic [Meloxicam] Other (comments)    Penicillin Other (comments)    Shrimp Extract Other (comments)       PCP: Nelson Harris MD    Current Facility-Administered Medications on File Prior to Encounter   Medication Dose Route Frequency Provider Last Rate Last Admin    [COMPLETED] morphine injection 4 mg  4 mg IntraVENous NOW Morteza Parada MD   4 mg at 12/29/22 0417    [COMPLETED] ondansetron (ZOFRAN) injection 4 mg  4 mg IntraVENous NOW Morteza Parada MD   4 mg at 12/29/22 0411    [COMPLETED] morphine injection 4 mg  4 mg IntraVENous NOW Morteza Parada MD   4 mg at 12/29/22 0714    [COMPLETED] diphenhydrAMINE (BENADRYL) injection 25 mg  25 mg IntraVENous ONCE Morteza Parada MD   25 mg at 12/29/22 0717    [COMPLETED] cefTRIAXone (ROCEPHIN) injection 1 g  1 g IntraVENous NOW Rajinder Abbott MD   1 g at 12/29/22 0720    [COMPLETED] azithromycin (ZITHROMAX) 500 mg in 0.9% sodium chloride 250 mL ([id number])  500 mg IntraVENous NOW Morteza Parada  mL/hr at 12/29/22 0806 500 mg at 12/29/22 1594    [DISCONTINUED] 0.9% sodium chloride (MBP/ADV) infusion              Current Outpatient Medications on File Prior to Encounter   Medication Sig Dispense Refill    doxycycline (MONODOX) 100 mg capsule Take 100 mg by mouth two (2) times a day. 12/28/2022 for 10 days      albuterol (PROVENTIL VENTOLIN) 2.5 mg /3 mL (0.083 %) nebu 3 mL by Nebulization route every four (4) hours as needed for Wheezing, Shortness of Breath or Respiratory Distress. 60 Each 0    metoprolol succinate (Toprol XL) 50 mg XL tablet Take 1 Tablet by mouth daily. 30 Tablet 0    [DISCONTINUED] methylPREDNISolone (Medrol, Brett,) 4 mg tablet Take as directed on packaging. 1 Dose Pack 0    d-mannose (AZO D-Mannose) 500 mg cap Take 2 Capsules by mouth daily. lisinopriL (PRINIVIL, ZESTRIL) 2.5 mg tablet Take 2.5 mg by mouth daily. ergocalciferol (ERGOCALCIFEROL) 1,250 mcg (50,000 unit) capsule Take 50,000 Units by mouth every seven (7) days. acetaminophen (TYLENOL) 500 mg tablet Take 1,000 mg by mouth every eight (8) hours as needed for Pain or Fever.       loperamide (IMMODIUM) 2 mg tablet Take 2 mg by mouth daily as needed for Diarrhea. EPINEPHrine (EPIPEN) 0.3 mg/0.3 mL injection 0.3 mg by IntraMUSCular route once as needed for Allergic Response. furosemide (LASIX) 20 mg tablet Take  by mouth daily as needed for PRN Reason (Other) (edema). guaiFENesin ER (MUCINEX) 600 mg ER tablet Take 600 mg by mouth two (2) times daily as needed for Congestion. neomycin-bacitracin-polymyxin (Triple Antibiotic) 3.5mg-400 unit- 5,000 unit/gram ointment Apply  to affected area daily as needed. Any skin tear or scrape      zolpidem (AMBIEN) 5 mg tablet Take 5 mg by mouth nightly as needed for Sleep. [DISCONTINUED] rivaroxaban (XARELTO) 15 mg tab tablet Take 15 mg by mouth daily. Saccharomyces boulardii (FLORASTOR) 250 mg capsule Take 500 mg by mouth two (2) times a day. albuterol (PROVENTIL HFA, VENTOLIN HFA, PROAIR HFA) 90 mcg/actuation inhaler Take 1-2 Puffs by inhalation every four (4) hours as needed for Wheezing. 1 g 0    allopurinoL (ZYLOPRIM) 300 mg tablet Take 300 mg by mouth daily. thyroid, Pork, (ARMOUR) 60 mg tablet Take 60 mg by mouth daily. cyanocobalamin 1,000 mcg tablet Take 2,000 mcg by mouth daily. montelukast (SINGULAIR) 10 mg tablet Take 10 mg by mouth daily. cetirizine (ZYRTEC) 10 mg tablet Take 10 mg by mouth nightly. Symbicort 160-4.5 mcg/actuation HFAA Take 2 Puffs by inhalation two (2) times a day. memantine (NAMENDA) 5 mg tablet TAKE 1 TABLET BY MOUTH EVERY DAY FOR 7 DAYS THEN 1 TABLET BY MOUTH TWICE DAILY      sertraline (ZOLOFT) 50 mg tablet Take 50 mg by mouth daily. Review of Systems   Review of Systems   Constitutional:  Negative for fever. HENT:  Negative for congestion. Respiratory:  Negative for cough and shortness of breath. Cardiovascular:  Negative for chest pain. Gastrointestinal:  Negative for abdominal pain, constipation, nausea and vomiting. Genitourinary:  Negative for dysuria.    Musculoskeletal:  Negative for arthralgias and myalgias. Skin:  Negative for rash. Allergic/Immunologic: Negative for immunocompromised state. Neurological:  Negative for syncope. Psychiatric/Behavioral:  Negative for confusion. Physical Exam   Physical Exam  Constitutional:       Appearance: Normal appearance. HENT:      Head: Normocephalic and atraumatic. Nose: Nose normal.      Mouth/Throat:      Mouth: Mucous membranes are moist.   Eyes:      Conjunctiva/sclera: Conjunctivae normal.      Pupils: Pupils are equal, round, and reactive to light. Cardiovascular:      Rate and Rhythm: Normal rate and regular rhythm. Heart sounds: Normal heart sounds. Pulmonary:      Effort: Pulmonary effort is normal.      Breath sounds: Normal breath sounds. Abdominal:      General: There is no distension. Palpations: Abdomen is soft. Tenderness: There is no abdominal tenderness. Musculoskeletal:      Cervical back: Normal range of motion and neck supple. Comments: Right hip tender to palpation, shortened, held in extension. Distal motor and sensation intact. Distal DP and PT pulses 2+   Skin:     General: Skin is warm and dry. Neurological:      General: No focal deficit present. Mental Status: She is alert and oriented to person, place, and time.    Psychiatric:         Mood and Affect: Mood normal.         Behavior: Behavior normal.       Lab and Diagnostic Study Results   Labs -     Recent Results (from the past 12 hour(s))   CBC WITH AUTOMATED DIFF    Collection Time: 12/29/22  3:26 AM   Result Value Ref Range    WBC 9.9 4.6 - 13.2 K/uL    RBC 4.26 4.20 - 5.30 M/uL    HGB 12.4 12.0 - 16.0 g/dL    HCT 38.0 35.0 - 45.0 %    MCV 89.2 78.0 - 100.0 FL    MCH 29.1 24.0 - 34.0 PG    MCHC 32.6 31.0 - 37.0 g/dL    RDW 14.9 (H) 11.6 - 14.5 %    PLATELET 769 983 - 868 K/uL    MPV 9.9 9.2 - 11.8 FL    NRBC 0.0 0.0  WBC    ABSOLUTE NRBC 0.00 0.00 - 0.01 K/uL    NEUTROPHILS 66 40 - 73 %    LYMPHOCYTES 23 21 - 52 % MONOCYTES 7 3 - 10 %    EOSINOPHILS 2 0 - 5 %    BASOPHILS 1 0 - 2 %    IMMATURE GRANULOCYTES 1 (H) 0 - 0.5 %    ABS. NEUTROPHILS 6.6 1.8 - 8.0 K/UL    ABS. LYMPHOCYTES 2.3 0.9 - 3.6 K/UL    ABS. MONOCYTES 0.7 0.05 - 1.2 K/UL    ABS. EOSINOPHILS 0.2 0.0 - 0.4 K/UL    ABS. BASOPHILS 0.1 0.0 - 0.1 K/UL    ABS. IMM. GRANS. 0.1 (H) 0.00 - 0.04 K/UL    DF AUTOMATED     TYPE & SCREEN    Collection Time: 12/29/22  3:26 AM   Result Value Ref Range    Crossmatch Expiration 01/01/2023,2359     ABO/Rh(D) A Positive     Antibody screen Negative    PROTHROMBIN TIME + INR    Collection Time: 12/29/22  3:26 AM   Result Value Ref Range    Prothrombin time 13.6 11.5 - 15.2 sec    INR 1.0 0.8 - 1.2     METABOLIC PANEL, COMPREHENSIVE    Collection Time: 12/29/22  3:26 AM   Result Value Ref Range    Sodium 138 136 - 145 mmol/L    Potassium 4.5 3.5 - 5.5 mmol/L    Chloride 101 100 - 111 mmol/L    CO2 29 21 - 32 mmol/L    Anion gap 8 3.0 - 18.0 mmol/L    Glucose 147 (H) 74 - 99 mg/dL    BUN 25 (H) 7 - 18 mg/dL    Creatinine 0.78 0.60 - 1.30 mg/dL    BUN/Creatinine ratio 32 (H) 12 - 20      eGFR >60 >60 ml/min/1.73m2    Calcium 8.7 8.5 - 10.1 mg/dL    Bilirubin, total 0.5 0.2 - 1.0 mg/dL    AST (SGOT) 20 10 - 38 U/L    ALT (SGPT) 23 13 - 56 U/L    Alk.  phosphatase 85 45 - 117 U/L    Protein, total 6.0 (L) 6.4 - 8.2 g/dL    Albumin 3.0 (L) 3.4 - 5.0 g/dL    Globulin 3.0 2.0 - 4.0 g/dL    A-G Ratio 1.0 0.8 - 1.7     TROPONIN-HIGH SENSITIVITY    Collection Time: 12/29/22  3:26 AM   Result Value Ref Range    Troponin-High Sensitivity 20 0 - 54 ng/L   NT-PRO BNP    Collection Time: 12/29/22  3:26 AM   Result Value Ref Range    NT pro-BNP 1,258 0 - 1,800 pg/mL   LACTIC ACID    Collection Time: 12/29/22  3:26 AM   Result Value Ref Range    Lactic acid 1.2 0.4 - 2.0 mmol/L   URINALYSIS W/ RFLX MICROSCOPIC    Collection Time: 12/29/22  5:05 AM   Result Value Ref Range    Color Yellow      Appearance Cloudy      Specific gravity 1.017 1.005 - 1.030 pH (UA) 6.0 5.0 - 8.0      Protein Negative Negative mg/dL    Glucose Negative Negative mg/dL    Ketone Negative Negative mg/dL    Bilirubin Negative Negative      Blood Negative Negative      Urobilinogen 0.2 0.2 - 1.0 EU/dL    Nitrites Negative Negative      Leukocyte Esterase Large (A) Negative     URINE MICROSCOPIC    Collection Time: 12/29/22  5:05 AM   Result Value Ref Range    WBC 20-50 0 - 4 /hpf    RBC 0-5 0 - 2 /hpf    Epithelial cells Few 0 - 20 /lpf    Bacteria 2+ (A) None /hpf   COVID-19 RAPID TEST    Collection Time: 12/29/22  5:10 AM   Result Value Ref Range    COVID-19 rapid test Not Detected Not Detected         Radiologic Studies -   @[email or web address]@  CT Results  (Last 48 hours)                 12/29/22 0408  CT SPINE CERV WO CONT Final result    Impression:      1. No acute displaced cervical spine fracture. 2.  Osseous degenerative changes and additional findings, as detailed in the   body of the report. MR cervical spine may be useful for further characterization   of disc pathology, central canal stenosis, and neural foraminal narrowing. 3.  Bilateral lung opacities may be related to infection (multifocal pneumonia). While these chest imaging findings can be seen with COVID-19, they are not   specific and can overlap with other viral infections, including influenza. Narrative:  EXAM: CT CERVICAL SPINE       CLINICAL INDICATION/HISTORY: fall.   -Additional: None. COMPARISON: CT head 12/29/2022       TECHNIQUE: Serial axial images of the cervical spine were obtained from the   skull base to the thoracic inlet without intravenous contrast. Sagittal and   coronal reformations were obtained from the source data. Automated exposure   control, mAs and/or kVp reductions based on patient size, and iterative   reconstruction. The specific techniques utilized on this CT exam have been   documented in the patient's electronic medical record.  Digital Imaging and   Communications in Medicine (DICOM) format image data are available to   nonaffiliated external healthcare facilities or entities on a secure, media   free, reciprocally searchable basis with patient authorization for at least a   12-month period after this study. _______________       FINDINGS:       VERTEBRAE AND DISCS: Bones are diffusely demineralized which limits evaluation   of subtle nondisplaced fractures. No acute displaced cervical spine fracture. The dens is intact. The gland to dental relationship is anatomic Multilevel   degenerative changes with trace anterolisthesis C3 on C4 and trace   retrolisthesis C5 on C6 which is likely degenerative. SPINAL CANAL AND FORAMINA: Multilevel facet arthropathy and uncovertebral joint   spurring produce central canal and bilateral foraminal stenoses most pronounced   C5-C7. EXTRASPINAL SOFT TISSUES: No prevertebral soft tissue swelling. Atherosclerotic   calcification is seen in both carotid bulbs. LUNG APICES: Patchy bilateral lung opacities (right greater than left). OTHER: None.       _______________           12/29/22 0407  CT HEAD WO CONT Final result    Impression:      1. No acute intracranial abnormalities are identified. Specifically no acute   intracranial hemorrhage. 2. Diffuse volume loss, chronic microvascular ischemic changes, and additional   findings as detailed in the body of the report. Narrative:  EXAM: CT head       INDICATION: Fall. COMPARISON: CT 10/3/2021. TECHNIQUE: Axial CT imaging of the head  was obtained from skull base to vertex   without intravenous contrast. One or more dose reduction techniques were used on   this CT: automated exposure control, adjustment of the mAs and/or kVp according   to patient size, and iterative reconstruction techniques. The specific   techniques used on this CT exam have been documented in the patient's electronic   medical record.  Digital Imaging and Communications in Medicine (DICOM) format   image data are available to nonaffiliated external healthcare facilities or   entities on a secure, media free, reciprocally searchable basis with patient   authorization for at least a 12-month period after this study. _______________       FINDINGS:       BRAIN AND POSTERIOR FOSSA: There is diffuse prominence of the ventricular   system, associated with proportional widening of the cortical cerebral sulci,   compatible with generalized volume loss. There is no intracranial hemorrhage,   mass effect, or shift of midline structures. Patchy hypodense white matter   lesions are seen within the periventricular and subcortical white matter which   are nonspecific, but likely represent chronic small vessel changes. No CT   evidence of acute large vessel infarct. EXTRA-AXIAL SPACES AND MENINGES: There are no abnormal extra-axial fluid   collections. CALVARIUM: No acute osseous abnormality. SINUSES: The visualized portions of the paranasal sinuses and mastoid air cells   are well aerated. OTHER: Atherosclerosis of the vascular siphons. Postsurgical changes are seen   both globes. _______________                 CXR Results  (Last 48 hours)                 12/29/22 0450  XR CHEST PORT Final result    Impression:      1. New bilateral lung opacities concerning for multifocal pneumonia   superimposed on chronic lung disease. 2.  Cardiomegaly, atherosclerosis, and vascular congestion - correlate for   cardiac dysfunction. Narrative:  EXAM: PORTABLE  FRONTAL CHEST RADIOGRAPH       CLINICAL INDICATION/HISTORY: Fall       COMPARISON: 12/14/2022       TECHNIQUE: Portable frontal view of the chest       _______________       FINDINGS:       SUPPORT DEVICES: EKG leads overlie the patient. HEART AND MEDIASTINUM: Mild cardiomegaly and pulmonary vascular congestion. Atherosclerosis       LUNGS: Patchy bilateral lung opacities. No lobar consolidation.  Redemonstrated right lower lobe granuloma. PLEURAL SPACES:No large pneumothorax. No large pleural effusion. BONY THORAX AND SOFT TISSUES: No acute abnormality. Bones appear diffusely   demineralized. Multilevel osseous degenerative changes. _______________                   Medical Decision Making and ED Course   Differential Diagnosis & Medical Decision Making Provider Note:   80-year-old female presents for evaluation of right hip pain following a fall out of bed. Patient was initially seen at an outside emergency department this morning, diagnosed with right hip fracture. Likely pneumonia found incidentally on imaging. Patient also diagnosed with UTI. Treated with Rocephin and azithromycin at the outside hospital, blood cultures drawn there. Patient sent for evaluation by orthopedic surgery. Acute comminuted displaced intertrochanteric right femur fracture with joint  effusion and overlying soft tissue swelling.     - I am the first provider for this patient. I reviewed the vital signs, available nursing notes, past medical history, past surgical history, family history and social history. The patients presenting problems have been discussed, and they are in agreement with the care plan formulated and outlined with them. I have encouraged them to ask questions as they arise throughout their visit. Vital Signs-Reviewed the patient's vital signs.   Patient Vitals for the past 12 hrs:   Temp Pulse Resp BP SpO2   12/29/22 1001 98.6 °F (37 °C) 98 20 (!) 126/7 98 %       ED Course:   ED Course as of 12/29/22 1508   Thu Dec 29, 2022   1132 Dr. Daquan Henson re-paged [BQ]      ED Course User Index  [BQ] MD Daquan Loyd to see once admitted    Procedures   Performed by: Yolande Reyes MD  Procedures      Disposition   Disposition: Admitted to Floor Medical Floor the case was discussed with the admitting physician Ravi        Diagnosis/Clinical Impression     Clinical Impression:   1. Closed fracture of right hip, initial encounter ([facility] [address]    2. Acute cystitis without hematuria    3. Community acquired pneumonia, unspecified laterality        Attestations: Jeff BUSTAMANTE MD, am the primary clinician of record. Please note that this dictation was completed with [employer], the computer voice recognition software. Quite often unanticipated grammatical, syntax, homophones, and other interpretive errors are inadvertently transcribed by the computer software. Please disregard these errors. Please excuse any errors that have escaped final proofreading. Thank you.

## 2022-12-29 NOTE — ACP (ADVANCE CARE PLANNING)
Advance Care Planning   Healthcare Decision Maker:       Primary Decision Maker: Willi Bocanegra - Daughter - [phone]

## 2022-12-30 ENCOUNTER — APPOINTMENT (OUTPATIENT)
Dept: GENERAL RADIOLOGY | Age: 87
DRG: 480 | End: 2022-12-30
Attending: ORTHOPAEDIC SURGERY
Payer: MEDICARE

## 2022-12-30 ENCOUNTER — ANESTHESIA (OUTPATIENT)
Dept: SURGERY | Age: 87
DRG: 480 | End: 2022-12-30
Payer: MEDICARE

## 2022-12-30 ENCOUNTER — ANESTHESIA EVENT (OUTPATIENT)
Dept: SURGERY | Age: 87
DRG: 480 | End: 2022-12-30
Payer: MEDICARE

## 2022-12-30 LAB
ANION GAP SERPL CALC-SCNC: 7 MMOL/L (ref 5–15)
BASOPHILS # BLD: 0 K/UL (ref 0–0.1)
BASOPHILS NFR BLD: 0 % (ref 0–1)
BUN SERPL-MCNC: 21 MG/DL (ref 6–20)
BUN/CREAT SERPL: 29 (ref 12–20)
CA-I BLD-MCNC: 8.3 MG/DL (ref 8.5–10.1)
CHLORIDE SERPL-SCNC: 102 MMOL/L (ref 97–108)
CO2 SERPL-SCNC: 28 MMOL/L (ref 21–32)
CREAT SERPL-MCNC: 0.73 MG/DL (ref 0.55–1.02)
DIFFERENTIAL METHOD BLD: ABNORMAL
EOSINOPHIL # BLD: 0.2 K/UL (ref 0–0.4)
EOSINOPHIL NFR BLD: 2 % (ref 0–7)
ERYTHROCYTE [DISTWIDTH] IN BLOOD BY AUTOMATED COUNT: 14.8 % (ref 11.5–14.5)
GLUCOSE BLD STRIP.AUTO-MCNC: 93 MG/DL (ref 65–100)
GLUCOSE SERPL-MCNC: 126 MG/DL (ref 65–100)
HCT VFR BLD AUTO: 30.3 % (ref 35–47)
HGB BLD-MCNC: 10 G/DL (ref 11.5–16)
IMM GRANULOCYTES # BLD AUTO: 0.1 K/UL (ref 0–0.04)
IMM GRANULOCYTES NFR BLD AUTO: 0 % (ref 0–0.5)
LYMPHOCYTES # BLD: 1.8 K/UL (ref 0.8–3.5)
LYMPHOCYTES NFR BLD: 15 % (ref 12–49)
MAGNESIUM SERPL-MCNC: 2.1 MG/DL (ref 1.6–2.4)
MCH RBC QN AUTO: 30.2 PG (ref 26–34)
MCHC RBC AUTO-ENTMCNC: 33 G/DL (ref 30–36.5)
MCV RBC AUTO: 91.5 FL (ref 80–99)
MONOCYTES # BLD: 0.9 K/UL (ref 0–1)
MONOCYTES NFR BLD: 7 % (ref 5–13)
NEUTS SEG # BLD: 9.5 K/UL (ref 1.8–8)
NEUTS SEG NFR BLD: 76 % (ref 32–75)
NRBC # BLD: 0 K/UL (ref 0–0.01)
NRBC BLD-RTO: 0 PER 100 WBC
PERFORMED BY, TECHID: NORMAL
PLATELET # BLD AUTO: 140 K/UL (ref 150–400)
PMV BLD AUTO: 10.6 FL (ref 8.9–12.9)
POTASSIUM SERPL-SCNC: 4.6 MMOL/L (ref 3.5–5.1)
RBC # BLD AUTO: 3.31 M/UL (ref 3.8–5.2)
SODIUM SERPL-SCNC: 137 MMOL/L (ref 136–145)
WBC # BLD AUTO: 12.5 K/UL (ref 3.6–11)

## 2022-12-30 PROCEDURE — 0QS606Z REPOSITION RIGHT UPPER FEMUR WITH INTRAMEDULLARY INTERNAL FIXATION DEVICE, OPEN APPROACH: ICD-10-PCS | Performed by: ORTHOPAEDIC SURGERY

## 2022-12-30 PROCEDURE — 74011250636 HC RX REV CODE- 250/636: Performed by: NURSE ANESTHETIST, CERTIFIED REGISTERED

## 2022-12-30 PROCEDURE — 74011250636 HC RX REV CODE- 250/636: Performed by: INTERNAL MEDICINE

## 2022-12-30 PROCEDURE — 76010000131 HC OR TIME 2 TO 2.5 HR: Performed by: ORTHOPAEDIC SURGERY

## 2022-12-30 PROCEDURE — 76060000035 HC ANESTHESIA 2 TO 2.5 HR: Performed by: ORTHOPAEDIC SURGERY

## 2022-12-30 PROCEDURE — 74011000250 HC RX REV CODE- 250: Performed by: NURSE ANESTHETIST, CERTIFIED REGISTERED

## 2022-12-30 PROCEDURE — C1769 GUIDE WIRE: HCPCS | Performed by: ORTHOPAEDIC SURGERY

## 2022-12-30 PROCEDURE — 76000 FLUOROSCOPY <1 HR PHYS/QHP: CPT

## 2022-12-30 PROCEDURE — 94761 N-INVAS EAR/PLS OXIMETRY MLT: CPT

## 2022-12-30 PROCEDURE — C1713 ANCHOR/SCREW BN/BN,TIS/BN: HCPCS | Performed by: ORTHOPAEDIC SURGERY

## 2022-12-30 PROCEDURE — 82962 GLUCOSE BLOOD TEST: CPT

## 2022-12-30 PROCEDURE — 83735 ASSAY OF MAGNESIUM: CPT

## 2022-12-30 PROCEDURE — 80048 BASIC METABOLIC PNL TOTAL CA: CPT

## 2022-12-30 PROCEDURE — 74011250637 HC RX REV CODE- 250/637: Performed by: PHYSICIAN ASSISTANT

## 2022-12-30 PROCEDURE — 77030011264 HC ELECTRD BLD EXT COVD -A: Performed by: ORTHOPAEDIC SURGERY

## 2022-12-30 PROCEDURE — 36415 COLL VENOUS BLD VENIPUNCTURE: CPT

## 2022-12-30 PROCEDURE — 74011000250 HC RX REV CODE- 250: Performed by: ORTHOPAEDIC SURGERY

## 2022-12-30 PROCEDURE — 2709999900 HC NON-CHARGEABLE SUPPLY: Performed by: ORTHOPAEDIC SURGERY

## 2022-12-30 PROCEDURE — 85025 COMPLETE CBC W/AUTO DIFF WBC: CPT

## 2022-12-30 PROCEDURE — 74011000250 HC RX REV CODE- 250: Performed by: INTERNAL MEDICINE

## 2022-12-30 PROCEDURE — 77030016451 HC BIT DRL AO3 STRY -C: Performed by: ORTHOPAEDIC SURGERY

## 2022-12-30 PROCEDURE — 76210000006 HC OR PH I REC 0.5 TO 1 HR: Performed by: ORTHOPAEDIC SURGERY

## 2022-12-30 PROCEDURE — 74011000258 HC RX REV CODE- 258: Performed by: NURSE ANESTHETIST, CERTIFIED REGISTERED

## 2022-12-30 PROCEDURE — 74011000250 HC RX REV CODE- 250: Performed by: ANESTHESIOLOGY

## 2022-12-30 PROCEDURE — 74011000258 HC RX REV CODE- 258: Performed by: INTERNAL MEDICINE

## 2022-12-30 PROCEDURE — 65270000029 HC RM PRIVATE

## 2022-12-30 PROCEDURE — 74011250637 HC RX REV CODE- 250/637: Performed by: INTERNAL MEDICINE

## 2022-12-30 PROCEDURE — 94640 AIRWAY INHALATION TREATMENT: CPT

## 2022-12-30 PROCEDURE — 77030031139 HC SUT VCRL2 J&J -A: Performed by: ORTHOPAEDIC SURGERY

## 2022-12-30 DEVICE — LAG SCREW, TI
Type: IMPLANTABLE DEVICE | Site: HIP | Status: FUNCTIONAL
Brand: GAMMA

## 2022-12-30 DEVICE — TROCHANTERIC NAIL KIT, TI
Type: IMPLANTABLE DEVICE | Site: HIP | Status: FUNCTIONAL
Brand: GAMMA

## 2022-12-30 DEVICE — LOCKING SCREW, FULLY THREADED: Type: IMPLANTABLE DEVICE | Site: HIP | Status: FUNCTIONAL

## 2022-12-30 RX ORDER — CEFAZOLIN SODIUM 1 G/3ML
INJECTION, POWDER, FOR SOLUTION INTRAMUSCULAR; INTRAVENOUS AS NEEDED
Status: DISCONTINUED | OUTPATIENT
Start: 2022-12-30 | End: 2022-12-30 | Stop reason: HOSPADM

## 2022-12-30 RX ORDER — MORPHINE SULFATE 2 MG/ML
1 INJECTION, SOLUTION INTRAMUSCULAR; INTRAVENOUS
Status: ACTIVE | OUTPATIENT
Start: 2022-12-30 | End: 2022-12-31

## 2022-12-30 RX ORDER — ASPIRIN 325 MG
325 TABLET, DELAYED RELEASE (ENTERIC COATED) ORAL 2 TIMES DAILY
Status: DISCONTINUED | OUTPATIENT
Start: 2022-12-30 | End: 2023-01-04 | Stop reason: HOSPADM

## 2022-12-30 RX ORDER — DIPHENHYDRAMINE HYDROCHLORIDE 50 MG/ML
12.5 INJECTION, SOLUTION INTRAMUSCULAR; INTRAVENOUS AS NEEDED
Status: DISCONTINUED | OUTPATIENT
Start: 2022-12-30 | End: 2022-12-30 | Stop reason: HOSPADM

## 2022-12-30 RX ORDER — MIDAZOLAM HYDROCHLORIDE 1 MG/ML
0.5 INJECTION, SOLUTION INTRAMUSCULAR; INTRAVENOUS
Status: DISCONTINUED | OUTPATIENT
Start: 2022-12-30 | End: 2022-12-30 | Stop reason: HOSPADM

## 2022-12-30 RX ORDER — LIDOCAINE HYDROCHLORIDE 10 MG/ML
0.1 INJECTION, SOLUTION EPIDURAL; INFILTRATION; INTRACAUDAL; PERINEURAL AS NEEDED
Status: DISCONTINUED | OUTPATIENT
Start: 2022-12-30 | End: 2022-12-30 | Stop reason: HOSPADM

## 2022-12-30 RX ORDER — SODIUM CHLORIDE 0.9 % (FLUSH) 0.9 %
5-40 SYRINGE (ML) INJECTION AS NEEDED
Status: DISCONTINUED | OUTPATIENT
Start: 2022-12-30 | End: 2023-01-04 | Stop reason: HOSPADM

## 2022-12-30 RX ORDER — HYDROMORPHONE HYDROCHLORIDE 1 MG/ML
0.5 INJECTION, SOLUTION INTRAMUSCULAR; INTRAVENOUS; SUBCUTANEOUS
Status: DISCONTINUED | OUTPATIENT
Start: 2022-12-30 | End: 2022-12-30 | Stop reason: HOSPADM

## 2022-12-30 RX ORDER — OXYCODONE AND ACETAMINOPHEN 5; 325 MG/1; MG/1
1 TABLET ORAL AS NEEDED
Status: DISCONTINUED | OUTPATIENT
Start: 2022-12-30 | End: 2022-12-30 | Stop reason: HOSPADM

## 2022-12-30 RX ORDER — EPHEDRINE SULFATE/0.9% NACL/PF 50 MG/5 ML
5 SYRINGE (ML) INTRAVENOUS AS NEEDED
Status: DISCONTINUED | OUTPATIENT
Start: 2022-12-30 | End: 2022-12-30 | Stop reason: HOSPADM

## 2022-12-30 RX ORDER — MORPHINE SULFATE 2 MG/ML
2 INJECTION, SOLUTION INTRAMUSCULAR; INTRAVENOUS
Status: DISCONTINUED | OUTPATIENT
Start: 2022-12-30 | End: 2022-12-30 | Stop reason: HOSPADM

## 2022-12-30 RX ORDER — SODIUM CHLORIDE 0.9 % (FLUSH) 0.9 %
5-40 SYRINGE (ML) INJECTION AS NEEDED
Status: DISCONTINUED | OUTPATIENT
Start: 2022-12-30 | End: 2022-12-30 | Stop reason: HOSPADM

## 2022-12-30 RX ORDER — ONDANSETRON 2 MG/ML
4 INJECTION INTRAMUSCULAR; INTRAVENOUS AS NEEDED
Status: DISCONTINUED | OUTPATIENT
Start: 2022-12-30 | End: 2022-12-30 | Stop reason: HOSPADM

## 2022-12-30 RX ORDER — SODIUM CHLORIDE 9 MG/ML
125 INJECTION, SOLUTION INTRAVENOUS CONTINUOUS
Status: DISPENSED | OUTPATIENT
Start: 2022-12-30 | End: 2022-12-31

## 2022-12-30 RX ORDER — TRAMADOL HYDROCHLORIDE 50 MG/1
50 TABLET ORAL
Status: DISCONTINUED | OUTPATIENT
Start: 2022-12-30 | End: 2023-01-02

## 2022-12-30 RX ORDER — MIDAZOLAM HYDROCHLORIDE 1 MG/ML
1 INJECTION, SOLUTION INTRAMUSCULAR; INTRAVENOUS AS NEEDED
Status: DISCONTINUED | OUTPATIENT
Start: 2022-12-30 | End: 2022-12-30 | Stop reason: HOSPADM

## 2022-12-30 RX ORDER — PROPOFOL 10 MG/ML
INJECTION, EMULSION INTRAVENOUS AS NEEDED
Status: DISCONTINUED | OUTPATIENT
Start: 2022-12-30 | End: 2022-12-30 | Stop reason: HOSPADM

## 2022-12-30 RX ORDER — SODIUM CHLORIDE 0.9 % (FLUSH) 0.9 %
5-40 SYRINGE (ML) INJECTION EVERY 8 HOURS
Status: DISCONTINUED | OUTPATIENT
Start: 2022-12-30 | End: 2022-12-30 | Stop reason: HOSPADM

## 2022-12-30 RX ORDER — SODIUM CHLORIDE, SODIUM LACTATE, POTASSIUM CHLORIDE, CALCIUM CHLORIDE 600; 310; 30; 20 MG/100ML; MG/100ML; MG/100ML; MG/100ML
INJECTION, SOLUTION INTRAVENOUS
Status: DISCONTINUED | OUTPATIENT
Start: 2022-12-30 | End: 2022-12-30 | Stop reason: HOSPADM

## 2022-12-30 RX ORDER — FENTANYL CITRATE 50 UG/ML
50 INJECTION, SOLUTION INTRAMUSCULAR; INTRAVENOUS
Status: DISCONTINUED | OUTPATIENT
Start: 2022-12-30 | End: 2022-12-30 | Stop reason: HOSPADM

## 2022-12-30 RX ORDER — FENTANYL CITRATE 50 UG/ML
50 INJECTION, SOLUTION INTRAMUSCULAR; INTRAVENOUS AS NEEDED
Status: DISCONTINUED | OUTPATIENT
Start: 2022-12-30 | End: 2022-12-30 | Stop reason: HOSPADM

## 2022-12-30 RX ORDER — FENTANYL CITRATE 50 UG/ML
INJECTION, SOLUTION INTRAMUSCULAR; INTRAVENOUS AS NEEDED
Status: DISCONTINUED | OUTPATIENT
Start: 2022-12-30 | End: 2022-12-30 | Stop reason: HOSPADM

## 2022-12-30 RX ORDER — OXYCODONE HYDROCHLORIDE 5 MG/1
2.5 TABLET ORAL
Status: CANCELLED | OUTPATIENT
Start: 2022-12-30

## 2022-12-30 RX ORDER — CELECOXIB 200 MG/1
200 CAPSULE ORAL DAILY
Status: DISCONTINUED | OUTPATIENT
Start: 2022-12-31 | End: 2023-01-04 | Stop reason: HOSPADM

## 2022-12-30 RX ORDER — BUPIVACAINE HYDROCHLORIDE 7.5 MG/ML
INJECTION, SOLUTION INTRASPINAL
Status: COMPLETED | OUTPATIENT
Start: 2022-12-30 | End: 2022-12-30

## 2022-12-30 RX ORDER — SODIUM CHLORIDE 0.9 % (FLUSH) 0.9 %
5-40 SYRINGE (ML) INJECTION EVERY 8 HOURS
Status: DISCONTINUED | OUTPATIENT
Start: 2022-12-30 | End: 2023-01-04 | Stop reason: HOSPADM

## 2022-12-30 RX ORDER — BUPIVACAINE HYDROCHLORIDE 5 MG/ML
INJECTION, SOLUTION EPIDURAL; INTRACAUDAL AS NEEDED
Status: DISCONTINUED | OUTPATIENT
Start: 2022-12-30 | End: 2022-12-30 | Stop reason: HOSPADM

## 2022-12-30 RX ORDER — AMOXICILLIN 250 MG
1 CAPSULE ORAL 2 TIMES DAILY
Status: DISCONTINUED | OUTPATIENT
Start: 2022-12-30 | End: 2023-01-02

## 2022-12-30 RX ORDER — FACIAL-BODY WIPES
10 EACH TOPICAL DAILY PRN
Status: DISCONTINUED | OUTPATIENT
Start: 2023-01-01 | End: 2023-01-02

## 2022-12-30 RX ADMIN — MEROPENEM 1 G: 1 INJECTION, POWDER, FOR SOLUTION INTRAVENOUS at 05:05

## 2022-12-30 RX ADMIN — PROPOFOL 10 MG: 10 INJECTION, EMULSION INTRAVENOUS at 22:15

## 2022-12-30 RX ADMIN — PROPOFOL 10 MG: 10 INJECTION, EMULSION INTRAVENOUS at 21:40

## 2022-12-30 RX ADMIN — PHENYLEPHRINE HYDROCHLORIDE 100 MCG: 10 INJECTION INTRAVENOUS at 21:13

## 2022-12-30 RX ADMIN — BUPIVACAINE HYDROCHLORIDE 12 MG: 7.5 INJECTION, SOLUTION INTRASPINAL at 20:59

## 2022-12-30 RX ADMIN — PROPOFOL 10 MG: 10 INJECTION, EMULSION INTRAVENOUS at 21:06

## 2022-12-30 RX ADMIN — ALLOPURINOL 300 MG: 100 TABLET ORAL at 09:00

## 2022-12-30 RX ADMIN — METOPROLOL SUCCINATE 50 MG: 50 TABLET, EXTENDED RELEASE ORAL at 10:52

## 2022-12-30 RX ADMIN — PROPOFOL 10 MG: 10 INJECTION, EMULSION INTRAVENOUS at 21:30

## 2022-12-30 RX ADMIN — TRAMADOL HYDROCHLORIDE 50 MG: 50 TABLET, COATED ORAL at 10:52

## 2022-12-30 RX ADMIN — MEROPENEM 1 G: 1 INJECTION, POWDER, FOR SOLUTION INTRAVENOUS at 18:37

## 2022-12-30 RX ADMIN — FENTANYL CITRATE 25 MCG: 50 INJECTION, SOLUTION INTRAMUSCULAR; INTRAVENOUS at 20:56

## 2022-12-30 RX ADMIN — CEFAZOLIN SODIUM 2 G: 1 INJECTION, POWDER, FOR SOLUTION INTRAMUSCULAR; INTRAVENOUS at 21:21

## 2022-12-30 RX ADMIN — BACITRACIN ZINC, NEOMYCIN, POLYMYXIN B: 400; 3.5; 5 OINTMENT TOPICAL at 18:41

## 2022-12-30 RX ADMIN — PHENYLEPHRINE HYDROCHLORIDE 100 MCG: 10 INJECTION INTRAVENOUS at 22:05

## 2022-12-30 RX ADMIN — PROPOFOL 10 MG: 10 INJECTION, EMULSION INTRAVENOUS at 22:05

## 2022-12-30 RX ADMIN — PROPOFOL 10 MG: 10 INJECTION, EMULSION INTRAVENOUS at 21:44

## 2022-12-30 RX ADMIN — PHENYLEPHRINE HYDROCHLORIDE 100 MCG: 10 INJECTION INTRAVENOUS at 21:00

## 2022-12-30 RX ADMIN — PHENYLEPHRINE HYDROCHLORIDE 100 MCG: 10 INJECTION INTRAVENOUS at 21:14

## 2022-12-30 RX ADMIN — PROPOFOL 25 MG: 10 INJECTION, EMULSION INTRAVENOUS at 20:59

## 2022-12-30 RX ADMIN — PROPOFOL 10 MG: 10 INJECTION, EMULSION INTRAVENOUS at 21:16

## 2022-12-30 RX ADMIN — PROPOFOL 10 MG: 10 INJECTION, EMULSION INTRAVENOUS at 21:50

## 2022-12-30 RX ADMIN — FENTANYL CITRATE 25 MCG: 50 INJECTION, SOLUTION INTRAMUSCULAR; INTRAVENOUS at 20:52

## 2022-12-30 RX ADMIN — PROPOFOL 10 MG: 10 INJECTION, EMULSION INTRAVENOUS at 21:55

## 2022-12-30 RX ADMIN — PROPOFOL 10 MG: 10 INJECTION, EMULSION INTRAVENOUS at 22:09

## 2022-12-30 RX ADMIN — FENTANYL CITRATE 25 MCG: 50 INJECTION, SOLUTION INTRAMUSCULAR; INTRAVENOUS at 20:48

## 2022-12-30 RX ADMIN — PROPOFOL 10 MG: 10 INJECTION, EMULSION INTRAVENOUS at 21:13

## 2022-12-30 RX ADMIN — BUDESONIDE AND FORMOTEROL FUMARATE DIHYDRATE 2 PUFF: 160; 4.5 AEROSOL RESPIRATORY (INHALATION) at 10:03

## 2022-12-30 RX ADMIN — PROPOFOL 10 MG: 10 INJECTION, EMULSION INTRAVENOUS at 21:22

## 2022-12-30 RX ADMIN — PROPOFOL 25 MG: 10 INJECTION, EMULSION INTRAVENOUS at 20:50

## 2022-12-30 RX ADMIN — PROPOFOL 10 MG: 10 INJECTION, EMULSION INTRAVENOUS at 21:09

## 2022-12-30 RX ADMIN — FENTANYL CITRATE 25 MCG: 50 INJECTION, SOLUTION INTRAMUSCULAR; INTRAVENOUS at 20:50

## 2022-12-30 RX ADMIN — PROPOFOL 10 MG: 10 INJECTION, EMULSION INTRAVENOUS at 21:01

## 2022-12-30 RX ADMIN — ACETAMINOPHEN 1000 MG: 500 TABLET ORAL at 05:05

## 2022-12-30 RX ADMIN — ACETAMINOPHEN 1000 MG: 500 TABLET ORAL at 10:52

## 2022-12-30 RX ADMIN — PROPOFOL 10 MG: 10 INJECTION, EMULSION INTRAVENOUS at 21:59

## 2022-12-30 RX ADMIN — PROPOFOL 10 MG: 10 INJECTION, EMULSION INTRAVENOUS at 22:12

## 2022-12-30 RX ADMIN — PROPOFOL 10 MG: 10 INJECTION, EMULSION INTRAVENOUS at 21:03

## 2022-12-30 RX ADMIN — PROPOFOL 10 MG: 10 INJECTION, EMULSION INTRAVENOUS at 21:35

## 2022-12-30 RX ADMIN — SODIUM CHLORIDE, PRESERVATIVE FREE 10 ML: 5 INJECTION INTRAVENOUS at 05:05

## 2022-12-30 RX ADMIN — PROPOFOL 10 MG: 10 INJECTION, EMULSION INTRAVENOUS at 21:26

## 2022-12-30 RX ADMIN — PHENYLEPHRINE HYDROCHLORIDE 50 MCG/MIN: 10 INJECTION INTRAVENOUS at 21:10

## 2022-12-30 RX ADMIN — SODIUM CHLORIDE, POTASSIUM CHLORIDE, SODIUM LACTATE AND CALCIUM CHLORIDE: 600; 310; 30; 20 INJECTION, SOLUTION INTRAVENOUS at 20:48

## 2022-12-30 RX ADMIN — PROPOFOL 10 MG: 10 INJECTION, EMULSION INTRAVENOUS at 21:19

## 2022-12-30 NOTE — ANESTHESIA PREPROCEDURE EVALUATION
Relevant Problems   RESPIRATORY SYSTEM   (+) Asthma   (+) Asthma attack   (+) Status asthmaticus with COPD (chronic obstructive pulmonary disease) (HCC)      NEUROLOGY   (+) Dementia (HCC)      CARDIOVASCULAR   (+) Hypertension     Right hip fx s/p GLF at [facility]    Anesthetic History   No history of anesthetic complications            Review of Systems / Medical History  Patient summary reviewed and pertinent labs reviewed    Pulmonary            Asthma        Neuro/Psych         Dementia     Cardiovascular    Hypertension      CHF  Dysrhythmias : atrial fibrillation      Exercise tolerance: <4 METS  Comments: TTE:    Left Ventricle: Moderately reduced left ventricular systolic function. EF by 2D Simpsons Biplane is 43%. Left ventricle size is normal. Moderately increased wall thickness. IVSd is 1.4 cm. LVPWd is 1.2 cm. Moderate global hypokinesis present.   Aortic Valve: Tricuspid valve. Moderately calcified  between the RCC and the [place]. Mild sclerosis of the aortic valve cusp. No stenosis. AV mean gradient is 8 mmHg. AV peak gradient is 12 mmHg. AV peak velocity is 1.7 m/s. AV area by continuity VTI is 2.5 cm2.   Mitral Valve: Moderate annular calcification(Mass) at the posterior leaflet of the mitral valve. Mild to moderate regurgitation. MV mean gradient is 3 mmHg. MV area by continuity equation is 3.5 cm2.   Tricuspid Valve: Mildly elevated RVSP. The estimated RVSP is 41 mmHg.   Left Atrium: Left atrium is severely dilated. Left atrial volume index is severely increased (>48 mL/m2). LA Vol Index is  54 ml/m2.   Right Atrium: Right atrium is mildly dilated.   Aorta: Normal sized aortic root. Mildly dilated ascending aorta. Ao Ascending diameter is 4.0 cm.      GI/Hepatic/Renal  Within defined limits              Endo/Other      Hypothyroidism       Other Findings          Past Medical History:   Diagnosis Date    Anxiety     Arthropathy     Asthma     Atrial fibrillation (HCC)     [facility]     Community acquired pneumonia     Dementia ([facility]     Gout     Hypertension     Ill-defined condition     dementia    Thyroid disease        Past Surgical History:   Procedure Laterality Date    HX ORTHOPAEDIC      left ankle orif    HX TONSILLECTOMY         Current Outpatient Medications   Medication Instructions    acetaminophen (TYLENOL) 1,000 mg, Oral, EVERY 8 HOURS AS NEEDED    albuterol (PROVENTIL HFA, VENTOLIN HFA, PROAIR HFA) 90 mcg/actuation inhaler 1-2 Puffs, Inhalation, EVERY 4 HOURS AS NEEDED    albuterol (PROVENTIL VENTOLIN) 2.5 mg, Nebulization, EVERY 4 HOURS AS NEEDED    allopurinoL (ZYLOPRIM) 300 mg, Oral, DAILY    cetirizine (ZYRTEC) 10 mg, Oral, EVERY BEDTIME    cyanocobalamin 2,000 mcg, Oral, DAILY    d-mannose (AZO D-Mannose) 500 mg cap 2 Capsules, Oral, DAILY    doxycycline (MONODOX) 100 mg, Oral, 2 TIMES DAILY, 12/28/2022 for 10 days    EPINEPHrine (EPIPEN) 0.3 mg, IntraMUSCular, ONCE PRN    ergocalciferol (ERGOCALCIFEROL) 50,000 Units, Oral, EVERY 7 DAYS    furosemide (LASIX) 20 mg tablet Oral, DAILY AS NEEDED    guaiFENesin ER (MUCINEX) 600 mg, Oral, 2 TIMES DAILY AS NEEDED    lisinopriL (PRINIVIL, ZESTRIL) 2.5 mg, Oral, DAILY    loperamide (IMMODIUM) 2 mg, Oral, DAILY AS NEEDED    memantine (NAMENDA) 5 mg tablet TAKE 1 TABLET BY MOUTH EVERY DAY FOR 7 DAYS THEN 1 TABLET BY MOUTH TWICE DAILY    metoprolol succinate (TOPROL XL) 50 mg, Oral, DAILY    montelukast (SINGULAIR) 10 mg, Oral, DAILY    neomycin-bacitracin-polymyxin (Triple Antibiotic) 3.5mg-400 unit- 5,000 unit/gram ointment Topical, DAILY AS NEEDED, Any skin tear or scrape    Saccharomyces boulardii (FLORASTOR) 500 mg, Oral, 2 TIMES DAILY    sertraline (ZOLOFT) 50 mg, Oral, DAILY    Symbicort 160-4.5 mcg/actuation HFAA 2 Puffs, Inhalation, 2 TIMES DAILY    thyroid (Pork) (ARMOUR) 60 mg, Oral, DAILY    zolpidem (AMBIEN) 5 mg, Oral, BEDTIME PRN       Current Facility-Administered Medications   Medication Dose Route Frequency    meropenem (MERREM) 1 g in 0.9% sodium chloride (MBP/ADV) 50 mL MBP  1 g IntraVENous Q8H    albuterol CONCENTRATE 2.5mg/0.5 mL neb soln  2.5 mg Nebulization Q4H PRN    allopurinoL (ZYLOPRIM) tablet 300 mg  300 mg Oral DAILY    cetirizine (ZYRTEC) tablet 10 mg  10 mg Oral QHS    cyanocobalamin tablet 2,000 mcg  2,000 mcg Oral DAILY    . PHARMACY TO SUBSTITUTE PER PROTOCOL (Reordered from: d-mannose (AZO D-Mannose) 500 mg cap)    Per Protocol    ergocalciferol capsule 50,000 Units  50,000 Units Oral Q7D    furosemide (LASIX) tablet 20 mg  20 mg Oral DAILY    lisinopriL (PRINIVIL, ZESTRIL) tablet 2.5 mg  2.5 mg Oral DAILY    memantine (NAMENDA) tablet 5 mg  5 mg Oral DAILY    metoprolol succinate (TOPROL-XL) XL tablet 50 mg  50 mg Oral DAILY    montelukast (SINGULAIR) tablet 10 mg  10 mg Oral DAILY    neomycin-bacitracin-polymyxin (NEOSPORIN) ointment   Topical QID    . PHARMACY TO SUBSTITUTE PER PROTOCOL (Reordered from: Saccharomyces boulardii (FLORASTOR) 250 mg capsule)    Per Protocol    sertraline (ZOLOFT) tablet 50 mg  50 mg Oral DAILY    budesonide-formoteroL (SYMBICORT) 160-4.5 mcg/actuation HFA inhaler 2 Puff  2 Puff Inhalation BID    thyroid (Pork) (ARMOUR) tablet 60 mg  60 mg Oral DAILY    sodium chloride (NS) flush 5-40 mL  5-40 mL IntraVENous Q8H    sodium chloride (NS) flush 5-40 mL  5-40 mL IntraVENous PRN    polyethylene glycol (MIRALAX) packet 17 g  17 g Oral DAILY PRN    ondansetron (ZOFRAN ODT) tablet 4 mg  4 mg Oral Q8H PRN    Or    ondansetron (ZOFRAN) injection 4 mg  4 mg IntraVENous Q6H PRN    morphine injection 1 mg  1 mg IntraVENous Q6H PRN    traMADoL (ULTRAM) tablet 50 mg  50 mg Oral Q6H PRN    acetaminophen (TYLENOL) tablet 1,000 mg  1,000 mg Oral Q6H       Patient Vitals for the past 24 hrs:   Temp Pulse Resp BP SpO2   12/30/22 1729 36.4 °C (97.6 °F) 87 18 (!) 99/58 --   12/30/22 1052 -- 94 -- (!) 118/56 --   12/30/22 1003 -- -- -- -- 92 %   12/30/22 0826 36.3 °C (97.3 °F) 99 18 (!) 111/55 93 %   12/30/22 0400 -- 99 -- -- --   12/30/22 0338 36.3 °C (97.4 °F) (!) 106 18 118/67 93 %   12/30/22 0000 -- (!) 102 -- -- --   12/29/22 2133 -- -- -- -- 93 %   12/29/22 2024 36.4 °C (97.6 °F) 99 18 (!) 104/58 93 %   12/29/22 2000 -- (!) 102 -- -- --   12/29/22 1928 36.6 °C (97.8 °F) 90 20 (!) 109/55 93 %       Lab Results   Component Value Date/Time    WBC 12.5 (H) 12/30/2022 02:43 AM    HGB 10.0 (L) 12/30/2022 02:43 AM    HCT 30.3 (L) 12/30/2022 02:43 AM    PLATELET 602 (L) 83/91/6988 02:43 AM    MCV 91.5 12/30/2022 02:43 AM     Lab Results   Component Value Date/Time    Sodium 137 12/30/2022 02:43 AM    Potassium 4.6 12/30/2022 02:43 AM    Chloride 102 12/30/2022 02:43 AM    CO2 28 12/30/2022 02:43 AM    Anion gap 7 12/30/2022 02:43 AM    Glucose 126 (H) 12/30/2022 02:43 AM    BUN 21 (H) 12/30/2022 02:43 AM    Creatinine 0.73 12/30/2022 02:43 AM    BUN/Creatinine ratio 29 (H) 12/30/2022 02:43 AM    GFR est AA >60 05/11/2022 11:06 AM    GFR est non-AA >60 05/11/2022 11:06 AM    Calcium 8.3 (L) 12/30/2022 02:43 AM     No results found for: APTT, PTP, INR, INREXT  Lab Results   Component Value Date/Time    Glucose 126 (H) 12/30/2022 02:43 AM         Physical Exam    Airway  Mallampati: II  TM Distance: 4 - 6 cm  Neck ROM: normal range of motion   Mouth opening: Normal     Cardiovascular    Rhythm: regular  Rate: normal         Dental  No notable dental hx       Pulmonary  Breath sounds clear to auscultation               Abdominal  GI exam deferred       Other Findings            Anesthetic Plan    ASA: 3, emergent  Anesthesia type: spinal    Monitoring Plan: Continuous noninvasive hemodynamic monitoring        Anesthetic plan and risks discussed with: Patient, Family and Son / Daughter      Benefits and risks of spinal anesthesia discussed with patient/family. All questions answered.   DNR in place, routine wm-operative resuscitation only, no heroic measures.

## 2022-12-30 NOTE — PROGRESS NOTES
PT eval order received and acknowledged. PT eval attempted at [phone] however per medical records pt pending surgical intervention. Will DC current PT/OT orders at this time, will require new PT/OT orders following surgery with updated WB status. Thank you.

## 2022-12-30 NOTE — PROGRESS NOTES
Bedside, Verbal, and Written shift change report given to Malini RN (oncoming nurse) by Marcus Naylor RN (offgoing nurse). Report included the following information SBAR.

## 2022-12-30 NOTE — CONSULTS
Pulmonary and Critical Care Consult    Subjective:   Consult Note: 12/30/2022 @no control      Chief Complaint:   Chief Complaint   Patient presents with    Fall        This patient has been seen and evaluated at the request of Dr. Rock Hurd and Dr. Kaylee Bunch    80year-old thinly built  lady  I am asked to see for pneumonia    Lifetime non-smoker  Does have a history of asthma    Also has a past medical history multiple comorbidities     Presents to [facility] status post fall. Patient states earlier today she tripped and fell subsequently which she has had sudden onset intermittent sharp right hip pain, moderate to severe in intensity, aggravated with movement, alleviated with rest, following which patient presented to the ED. Patient denies any fevers chills nausea vomiting lightheadedness dizziness dyspnea orthopnea paroxysmal nocturnal dyspnea chest pain palpitations headache focal weakness loss sensation auditory or visual symptoms abdominal stool or urinary complaints or any other associated symptoms. On room air    Chest x-ray reviewed and shows bilateral patchy infiltrates suspicious for pneumonia or volume excess    Review of Systems:  A comprehensive review of systems was negative except for that written in the HPI. Past Medical History:   Diagnosis Date    Anxiety     Arthropathy     Asthma     Atrial fibrillation (HCC)     CH[facility]congestive [facility] failure) ([facility]     Community acquired pneumonia     Dementia ([facility]     Gout     Hypertension     Ill-defined condition     dementia    Thyroid disease      Past Surgical History:   Procedure Laterality Date    HX ORTHOPAEDIC      left ankle orif    HX TONSILLECTOMY        History reviewed. No pertinent family history.   Social History     Tobacco Use    Smoking status: Never    Smokeless tobacco: Never   Substance Use Topics    Alcohol use: Never      Current Facility-Administered Medications   Medication Dose Route Frequency Provider Last Rate Last Admin    meropenem (MERREM) 1 g in 0.9% sodium chloride (MBP/ADV) 50 mL MBP  1 g IntraVENous Shanon Silva  mL/hr at 12/30/22 0505 1 g at 12/30/22 0505    albuterol CONCENTRATE 2.5mg/0.5 mL neb soln  2.5 mg Nebulization Q4H PRN Eddie Pressley MD        allopurinoL (ZYLOPRIM) tablet 300 mg  300 mg Oral DAILY Eddie Pressley MD        cetirizine (ZYRTEC) tablet 10 mg  10 mg Oral QHS Eddie Pressley MD   10 mg at 12/29/22 2132    cyanocobalamin tablet 2,000 mcg  2,000 mcg Oral DAILY Eddie Pressley MD        .PHARMACY TO SUBSTITUTE PER PROTOCOL (Reordered from: d-mannose (AZO D-Mannose) 500 mg cap)    Per Protocol Vahid Rodrigues MD        ergocalciferol capsule 50,000 Units  50,000 Units Oral Q7D Eddie Pressley MD        furosemide (LASIX) tablet 20 mg  20 mg Oral Judge Fatmata MD        lisinopriL (PRINIVIL, ZESTRIL) tablet 2.5 mg  2.5 mg Oral Judge Fatmata MD        memantine [facility]) tablet 5 mg  5 mg Oral DAILY Eddie Pressley MD        metoprolol succinate (TOPROL-XL) XL tablet 50 mg  50 mg Oral DAILY Eddie Pressley MD   50 mg at 12/30/22 1052    montelukast (SINGULAIR) tablet 10 mg  10 mg Oral Travis BlVahid schofield MD        neomycin-bacitracin-polymyxin (NEOSPORIN) ointment   Topical QID Eddie Pressley MD        .PHARMACY TO SUBSTITUTE PER PROTOCOL (Reordered from: Saccharomyces boulardii (FLORASTOR) 250 mg capsule)    Per Protocol Vahid Deng MD        sertraline (ZOLOFT) tablet 50 mg  50 mg Oral Judge Fatmata MD        budesonide-formoteroL [facility]) 160-4.5 mcg/actuation HFA inhaler 2 Puff  2 Puff Inhalation BID Eddie Pressley MD   2 Puff at 12/30/22 1003    thyroid (Pork) (ARMOUR) tablet 60 mg  60 mg Oral DAILY Vahid Rodrigues MD sodium chloride (NS) flush 5-40 mL  5-40 mL IntraVENous Shanon Jacobs Cha, MD   10 mL at 22 0505    sodium chloride (NS) flush 5-40 mL  5-40 mL IntraVENous PRN Toni Rodrigues MD        polyethylene glycol [facility]) packet 17 g  17 g Oral DAILY PRN Romario Soni MD        ondansetron (ZOFRAN ODT) tablet 4 mg  4 mg Oral Q8H PRN Romario Soni MD        Or    [facility]ron [facility]) injection 4 mg  4 mg IntraVENous Q6H PRN Romario Soni MD        enoxaparin (LOVENOX) injection 40 mg  40 mg SubCUTAneous Q24H Jovon Quispe PA-C        morphine injection 1 mg  1 mg IntraVENous Q6H PRN Jovon Quispe PA-C        traMADoL (ULTRAM) tablet 50 mg  50 mg Oral Q6H PRN Jovon Quispe PA-C   50 mg at 22 1052    acetaminophen (TYLENOL) tablet 1,000 mg  1,000 mg Oral Q6H Jovon Quispe PA-C   1,000 mg at 22 1052          Allergies   Allergen Reactions    Cipro [Ciprofloxacin Hcl] Other (comments)    Ketek [Telithromycin] Other (comments)    Mobic [Meloxicam] Other (comments)    Penicillin Other (comments)    Shrimp Extract Other (comments)           Objective:     Blood pressure (!) 118/56, pulse 94, temperature 97.3 °F (36.3 °C), resp. rate 18, weight 68.8 kg (151 lb 10.8 oz), SpO2 92 %. Temp (24hrs), Av.7 °F (36.5 °C), Min:97.3 °F (36.3 °C), Max:98.3 °F (36.8 °C)      Intake and Output:  Current Shift: No intake/output data recorded. Last 3 Shifts:  1901 -  0700  In: -   Out: 350 [Urine:350]    Intake/Output Summary (Last 24 hours) at 2022 1125  Last data filed at 2022 0522  Gross per 24 hour   Intake --   Output 350 ml   Net -350 ml        Physical Exam:     General: Lying in bed comfortably, no acute distress  Eye: Reactive, symmetric  Throat and Neck: Supple  Lung: Reduced air entry bilaterally with prolonged exhalation but no wheezing. Occasional crackles. Heart: S1+S2. No murmurs  Abdomen: soft, non-tender. Bowel sounds normal. No masses; obese  Extremities: No edema  : Not done  Skin: No cyanosis  Neurologic: A & O x3. Grossly nonfocal  Psychiatric: Appropriate affect; coherent      Lab/Data Review:    Recent Results (from the past 24 hour(s))   METABOLIC PANEL, BASIC    Collection Time: 12/30/22  2:43 AM   Result Value Ref Range    Sodium 137 136 - 145 mmol/L    Potassium 4.6 3.5 - 5.1 mmol/L    Chloride 102 97 - 108 mmol/L    CO2 28 21 - 32 mmol/L    Anion gap 7 5 - 15 mmol/L    Glucose 126 (H) 65 - 100 mg/dL    BUN 21 (H) 6 - 20 mg/dL    Creatinine 0.73 0.55 - 1.02 mg/dL    BUN/Creatinine ratio 29 (H) 12 - 20      eGFR >60 >60 ml/min/1.73m2    Calcium 8.3 (L) 8.5 - 10.1 mg/dL   MAGNESIUM    Collection Time: 12/30/22  2:43 AM   Result Value Ref Range    Magnesium 2.1 1.6 - 2.4 mg/dL   CBC WITH AUTOMATED DIFF    Collection Time: 12/30/22  2:43 AM   Result Value Ref Range    WBC 12.5 (H) 3.6 - 11.0 K/uL    RBC 3.31 (L) 3.80 - 5.20 M/uL    HGB 10.0 (L) 11.5 - 16.0 g/dL    HCT 30.3 (L) 35.0 - 47.0 %    MCV 91.5 80.0 - 99.0 FL    MCH 30.2 26.0 - 34.0 PG    MCHC 33.0 30.0 - 36.5 g/dL    RDW 14.8 (H) 11.5 - 14.5 %    PLATELET 117 (L) 628 - 400 K/uL    MPV 10.6 8.9 - 12.9 FL    NRBC 0.0 0.0  WBC    ABSOLUTE NRBC 0.00 0.00 - 0.01 K/uL    NEUTROPHILS 76 (H) 32 - 75 %    LYMPHOCYTES 15 12 - 49 %    MONOCYTES 7 5 - 13 %    EOSINOPHILS 2 0 - 7 %    BASOPHILS 0 0 - 1 %    IMMATURE GRANULOCYTES 0 0 - 0.5 %    ABS. NEUTROPHILS 9.5 (H) 1.8 - 8.0 K/UL    ABS. LYMPHOCYTES 1.8 0.8 - 3.5 K/UL    ABS. MONOCYTES 0.9 0.0 - 1.0 K/UL    ABS. EOSINOPHILS 0.2 0.0 - 0.4 K/UL    ABS. BASOPHILS 0.0 0.0 - 0.1 K/UL    ABS. IMM. GRANS. 0.1 (H) 0.00 - 0.04 K/UL    DF AUTOMATED         No orders to display       CT Results  (Last 48 hours)                 12/29/22 0408  CT SPINE CERV WO CONT Final result    Impression:      1. No acute displaced cervical spine fracture.    2.  Osseous degenerative changes and additional findings, as detailed in the   body of the report. MR cervical spine may be useful for further characterization   of disc pathology, central canal stenosis, and neural foraminal narrowing. 3.  Bilateral lung opacities may be related to infection (multifocal pneumonia). While these chest imaging findings can be seen with COVID-19, they are not   specific and can overlap with other viral infections, including influenza. Narrative:  EXAM: CT CERVICAL SPINE       CLINICAL INDICATION/HISTORY: fall.   -Additional: None. COMPARISON: CT head 12/29/2022       TECHNIQUE: Serial axial images of the cervical spine were obtained from the   skull base to the thoracic inlet without intravenous contrast. Sagittal and   coronal reformations were obtained from the source data. Automated exposure   control, mAs and/or kVp reductions based on patient size, and iterative   reconstruction. The specific techniques utilized on this CT exam have been   documented in the patient's electronic medical record. Digital Imaging and   Communications in Medicine (DICOM) format image data are available to   nonaffiliated external healthcare facilities or entities on a secure, media   free, reciprocally searchable basis with patient authorization for at least a   12-month period after this study. _______________       FINDINGS:       VERTEBRAE AND DISCS: Bones are diffusely demineralized which limits evaluation   of subtle nondisplaced fractures. No acute displaced cervical spine fracture. The dens is intact. The gland to dental relationship is anatomic Multilevel   degenerative changes with trace anterolisthesis C3 on C4 and trace   retrolisthesis C5 on C6 which is likely degenerative. SPINAL CANAL AND FORAMINA: Multilevel facet arthropathy and uncovertebral joint   spurring produce central canal and bilateral foraminal stenoses most pronounced   C5-C7. EXTRASPINAL SOFT TISSUES: No prevertebral soft tissue swelling.  Atherosclerotic calcification is seen in both carotid bulbs. LUNG APICES: Patchy bilateral lung opacities (right greater than left). OTHER: None.       _______________           12/29/22 0407  CT HEAD WO CONT Final result    Impression:      1. No acute intracranial abnormalities are identified. Specifically no acute   intracranial hemorrhage. 2. Diffuse volume loss, chronic microvascular ischemic changes, and additional   findings as detailed in the body of the report. Narrative:  EXAM: CT head       INDICATION: Fall. COMPARISON: CT 10/3/2021. TECHNIQUE: Axial CT imaging of the head  was obtained from skull base to vertex   without intravenous contrast. One or more dose reduction techniques were used on   this CT: automated exposure control, adjustment of the mAs and/or kVp according   to patient size, and iterative reconstruction techniques. The specific   techniques used on this CT exam have been documented in the patient's electronic   medical record. Digital Imaging and Communications in Medicine (DICOM) format   image data are available to nonaffiliated external healthcare facilities or   entities on a secure, media free, reciprocally searchable basis with patient   authorization for at least a 12-month period after this study. _______________       FINDINGS:       BRAIN AND POSTERIOR FOSSA: There is diffuse prominence of the ventricular   system, associated with proportional widening of the cortical cerebral sulci,   compatible with generalized volume loss. There is no intracranial hemorrhage,   mass effect, or shift of midline structures. Patchy hypodense white matter   lesions are seen within the periventricular and subcortical white matter which   are nonspecific, but likely represent chronic small vessel changes. No CT   evidence of acute large vessel infarct. EXTRA-AXIAL SPACES AND MENINGES: There are no abnormal extra-axial fluid   collections.        CALVARIUM: No acute osseous abnormality. SINUSES: The visualized portions of the paranasal sinuses and mastoid air cells   are well aerated. OTHER: Atherosclerosis of the vascular siphons. Postsurgical changes are seen   both globes. _______________                     Assessment:     1. Bilateral pneumonia  2. Asthma  3. Right hip fracture  4. Preoperative clearance  5. UTI  6. Hypertension  7.   History of dementia    Plan:     Patient admitted to the hospital  Will be watched here closely    Currently on room air  Use supplemental oxygen if needed to keep saturation above 92%    Chest x-ray shows bilateral patchy infiltrates suspicious for pneumonia or volume excess  Continue antibiotics  Culture sent  Further changes in antibiotics based on clinical response and culture results    Patient has a acute right hip fracture  Appreciate Ortho input  Possible surgical intervention today  I believe the patient has adequate lung function and is cleared from the pulmonary standpoint    Antibiotics for UTI    Continue with her medications  Continue medications for gout    DVT and GI prophylaxis    Questions of patient were answered at bedside in detail  Case discussed in detail with RN, RT, and care team  Thank you for involving me in the care of this patient  I will follow with you closely during hospitalization    Alin Araujo MD  [facility]  12/30/2022  11:25 AM

## 2022-12-30 NOTE — PROGRESS NOTES
Hospitalist Progress Note               Daily Progress Note: 12/30/2022      Subjective: The patient is seen for follow up. She is an 77-year-old female with a history of atrial fibrillation, CHF, dementia, hypertension, thyroid disease and asthma who was brought to the ED on 12/29 after a ground-level fall. She developed immediate right sided hip pain. In the ED, x-ray demonstrated an acute comminuted displaced intertrochanteric right femur fracture with joint effusion. Patient has been seen by orthopedics with plans for surgical repair today. She was been seen by pulmonology preoperatively. Her chest x-ray shows bilateral multifocal pneumonia, however.        Patient does note she has been coughing recently    Problem List:  Problem List as of 12/30/2022 Date Reviewed: 10/3/2021            Codes Class Noted - Resolved    Hip fracture ([facility] ICD-10-CM: S72.009A  ICD-9-CM: 820.8  12/29/2022 - Present        UTI (urinary tract infection) ICD-10-CM: N39.0  ICD-9-CM: 599.0  12/29/2022 - Present        Asthma attack ICD-10-CM: J45.901  ICD-9-CM: 493.92  12/14/2022 - Present        Status asthmaticus with COPD (chronic obstructive pulmonary disease) ([facility] ICD-10-CM: J44.9, J45.902  ICD-9-CM: 493.21  12/14/2022 - Present        Dementia ([facility] ICD-10-CM: F03.90  ICD-9-CM: 294.20  10/3/2021 - Present        Asthma ICD-10-CM: J45.909  ICD-9-CM: 493.90  10/3/2021 - Present        Ill-defined condition ICD-10-CM: R69  ICD-9-CM: 799.89  10/3/2021 - Present        Hypertension ICD-10-CM: I10  ICD-9-CM: 401.9  10/3/2021 - Present           Medications reviewed  Current Facility-Administered Medications   Medication Dose Route Frequency    meropenem (MERREM) 1 g in 0.9% sodium chloride (MBP/ADV) 50 mL MBP  1 g IntraVENous Q8H    albuterol CONCENTRATE 2.5mg/0.5 mL neb soln  2.5 mg Nebulization Q4H PRN    allopurinoL (ZYLOPRIM) tablet 300 mg  300 mg Oral DAILY    cetirizine (ZYRTEC) tablet 10 mg  10 mg Oral QHS cyanocobalamin tablet 2,000 mcg  2,000 mcg Oral DAILY    . PHARMACY TO SUBSTITUTE PER PROTOCOL (Reordered from: d-mannose (AZO D-Mannose) 500 mg cap)    Per Protocol    ergocalciferol capsule 50,000 Units  50,000 Units Oral Q7D    furosemide (LASIX) tablet 20 mg  20 mg Oral DAILY    lisinopriL (PRINIVIL, ZESTRIL) tablet 2.5 mg  2.5 mg Oral DAILY    memantine (NAMENDA) tablet 5 mg  5 mg Oral DAILY    metoprolol succinate (TOPROL-XL) XL tablet 50 mg  50 mg Oral DAILY    montelukast (SINGULAIR) tablet 10 mg  10 mg Oral DAILY    neomycin-bacitracin-polymyxin (NEOSPORIN) ointment   Topical QID    . PHARMACY TO SUBSTITUTE PER PROTOCOL (Reordered from: Saccharomyces boulardii (FLORASTOR) 250 mg capsule)    Per Protocol    sertraline (ZOLOFT) tablet 50 mg  50 mg Oral DAILY    budesonide-formoteroL (SYMBICORT) 160-4.5 mcg/actuation HFA inhaler 2 Puff  2 Puff Inhalation BID    thyroid (Pork) (ARMOUR) tablet 60 mg  60 mg Oral DAILY    sodium chloride (NS) flush 5-40 mL  5-40 mL IntraVENous Q8H    sodium chloride (NS) flush 5-40 mL  5-40 mL IntraVENous PRN    polyethylene glycol (MIRALAX) packet 17 g  17 g Oral DAILY PRN    ondansetron (ZOFRAN ODT) tablet 4 mg  4 mg Oral Q8H PRN    Or    ondansetron (ZOFRAN) injection 4 mg  4 mg IntraVENous Q6H PRN    enoxaparin (LOVENOX) injection 40 mg  40 mg SubCUTAneous Q24H    morphine injection 1 mg  1 mg IntraVENous Q6H PRN    traMADoL (ULTRAM) tablet 50 mg  50 mg Oral Q6H PRN    acetaminophen (TYLENOL) tablet 1,000 mg  1,000 mg Oral Q6H       Review of Systems:   A comprehensive review of systems was negative except for that written in the HPI. Objective:   Physical Exam:     Visit Vitals  BP (!) 118/56   Pulse 94   Temp 97.3 °F (36.3 °C)   Resp 18   Wt 68.8 kg (151 lb 10.8 oz)   SpO2 92%   BMI 21.46 kg/m²      O2 Device: None (Room air)    Temp (24hrs), Av.7 °F (36.5 °C), Min:97.3 °F (36.3 °C), Max:98.3 °F (36.8 °C)    No intake/output data recorded.    1901 -  0700  In: -   Out: 350 [Urine:350]    General:   Awake and alert   Lungs: Few rhonchi bilaterally at the bases   Chest wall:  No tenderness or deformity. Heart:  Regular rate and rhythm, S1, S2 normal, no murmur, click, rub or gallop. Abdomen:   Soft, non-tender. Bowel sounds normal. No masses,  No organomegaly. Extremities: Extremities normal, atraumatic, no cyanosis or edema. Pulses: 2+ and symmetric all extremities. Skin: Skin color, texture, turgor normal. No rashes or lesions   Neurologic: CNII-XII intact. No gross focal deficits         Data Review:       Recent Days:  Recent Labs     12/30/22  0243 12/29/22  0326   WBC 12.5* 9.9   HGB 10.0* 12.4   HCT 30.3* 38.0   * 190     Recent Labs     12/30/22  0243 12/29/22  0326    138   K 4.6 4.5    101   CO2 28 29   * 147*   BUN 21* 25*   CREA 0.73 0.78   CA 8.3* 8.7   MG 2.1  --    ALB  --  3.0*   TBILI  --  0.5   ALT  --  23   INR  --  1.0     No results for input(s): PH, PCO2, PO2, HCO3, FIO2 in the last 72 hours.     24 Hour Results:  Recent Results (from the past 24 hour(s))   METABOLIC PANEL, BASIC    Collection Time: 12/30/22  2:43 AM   Result Value Ref Range    Sodium 137 136 - 145 mmol/L    Potassium 4.6 3.5 - 5.1 mmol/L    Chloride 102 97 - 108 mmol/L    CO2 28 21 - 32 mmol/L    Anion gap 7 5 - 15 mmol/L    Glucose 126 (H) 65 - 100 mg/dL    BUN 21 (H) 6 - 20 mg/dL    Creatinine 0.73 0.55 - 1.02 mg/dL    BUN/Creatinine ratio 29 (H) 12 - 20      eGFR >60 >60 ml/min/1.73m2    Calcium 8.3 (L) 8.5 - 10.1 mg/dL   MAGNESIUM    Collection Time: 12/30/22  2:43 AM   Result Value Ref Range    Magnesium 2.1 1.6 - 2.4 mg/dL   CBC WITH AUTOMATED DIFF    Collection Time: 12/30/22  2:43 AM   Result Value Ref Range    WBC 12.5 (H) 3.6 - 11.0 K/uL    RBC 3.31 (L) 3.80 - 5.20 M/uL    HGB 10.0 (L) 11.5 - 16.0 g/dL    HCT 30.3 (L) 35.0 - 47.0 %    MCV 91.5 80.0 - 99.0 FL    MCH 30.2 26.0 - 34.0 PG    MCHC 33.0 30.0 - 36.5 g/dL    RDW 14.8 (H) 11.5 - 14.5 %    PLATELET 065 (L) 952 - 400 K/uL    MPV 10.6 8.9 - 12.9 FL    NRBC 0.0 0.0  WBC    ABSOLUTE NRBC 0.00 0.00 - 0.01 K/uL    NEUTROPHILS 76 (H) 32 - 75 %    LYMPHOCYTES 15 12 - 49 %    MONOCYTES 7 5 - 13 %    EOSINOPHILS 2 0 - 7 %    BASOPHILS 0 0 - 1 %    IMMATURE GRANULOCYTES 0 0 - 0.5 %    ABS. NEUTROPHILS 9.5 (H) 1.8 - 8.0 K/UL    ABS. LYMPHOCYTES 1.8 0.8 - 3.5 K/UL    ABS. MONOCYTES 0.9 0.0 - 1.0 K/UL    ABS. EOSINOPHILS 0.2 0.0 - 0.4 K/UL    ABS. BASOPHILS 0.0 0.0 - 0.1 K/UL    ABS. IMM. GRANS. 0.1 (H) 0.00 - 0.04 K/UL    DF AUTOMATED         No orders to display        Assessment:  Acute right-sided hip fracture    Bilateral multifocal community-acquired pneumonia   -Patient not hypoxic, not septic, clinically stable    Chronic asthma    Paroxysmal atrial fibrillation    Dementia    Hypothyroidism    Chronic systolic heart failure, EF 43%    Moderate pulmonary hypertension    Plan:  Continue meropenem    Per pulmonology, okay to proceed with surgery today      Care Plan discussed with: Patient/Family    Disposition:     Total time spent with patient: 30 minutes.     Ben Chino MD

## 2022-12-31 LAB
ANION GAP SERPL CALC-SCNC: 6 MMOL/L (ref 5–15)
BUN SERPL-MCNC: 27 MG/DL (ref 6–20)
BUN/CREAT SERPL: 26 (ref 12–20)
CA-I BLD-MCNC: 8.4 MG/DL (ref 8.5–10.1)
CHLORIDE SERPL-SCNC: 103 MMOL/L (ref 97–108)
CO2 SERPL-SCNC: 26 MMOL/L (ref 21–32)
CREAT SERPL-MCNC: 1.02 MG/DL (ref 0.55–1.02)
GLUCOSE SERPL-MCNC: 122 MG/DL (ref 65–100)
HGB BLD-MCNC: 9.9 G/DL (ref 11.5–16)
POTASSIUM SERPL-SCNC: 4.7 MMOL/L (ref 3.5–5.1)
SODIUM SERPL-SCNC: 135 MMOL/L (ref 136–145)

## 2022-12-31 PROCEDURE — 65270000029 HC RM PRIVATE

## 2022-12-31 PROCEDURE — 74011250636 HC RX REV CODE- 250/636: Performed by: ORTHOPAEDIC SURGERY

## 2022-12-31 PROCEDURE — 97162 PT EVAL MOD COMPLEX 30 MIN: CPT

## 2022-12-31 PROCEDURE — 74011250637 HC RX REV CODE- 250/637: Performed by: ORTHOPAEDIC SURGERY

## 2022-12-31 PROCEDURE — 94640 AIRWAY INHALATION TREATMENT: CPT

## 2022-12-31 PROCEDURE — 97530 THERAPEUTIC ACTIVITIES: CPT

## 2022-12-31 PROCEDURE — 97165 OT EVAL LOW COMPLEX 30 MIN: CPT

## 2022-12-31 PROCEDURE — 74011000250 HC RX REV CODE- 250: Performed by: ORTHOPAEDIC SURGERY

## 2022-12-31 PROCEDURE — 80048 BASIC METABOLIC PNL TOTAL CA: CPT

## 2022-12-31 PROCEDURE — 36415 COLL VENOUS BLD VENIPUNCTURE: CPT

## 2022-12-31 PROCEDURE — 97535 SELF CARE MNGMENT TRAINING: CPT

## 2022-12-31 PROCEDURE — 85018 HEMOGLOBIN: CPT

## 2022-12-31 PROCEDURE — 74011000258 HC RX REV CODE- 258: Performed by: ORTHOPAEDIC SURGERY

## 2022-12-31 RX ADMIN — ALBUTEROL SULFATE 2.5 MG: 2.5 SOLUTION RESPIRATORY (INHALATION) at 01:10

## 2022-12-31 RX ADMIN — Medication 1 TABLET: at 22:18

## 2022-12-31 RX ADMIN — ACETAMINOPHEN 1000 MG: 500 TABLET ORAL at 16:37

## 2022-12-31 RX ADMIN — CEFAZOLIN 2 G: 1 INJECTION, POWDER, FOR SOLUTION INTRAMUSCULAR; INTRAVENOUS at 00:19

## 2022-12-31 RX ADMIN — Medication 1 TABLET: at 09:28

## 2022-12-31 RX ADMIN — MEROPENEM 1 G: 1 INJECTION, POWDER, FOR SOLUTION INTRAVENOUS at 04:59

## 2022-12-31 RX ADMIN — ACETAMINOPHEN 1000 MG: 500 TABLET ORAL at 05:00

## 2022-12-31 RX ADMIN — MEROPENEM 1 G: 1 INJECTION, POWDER, FOR SOLUTION INTRAVENOUS at 14:20

## 2022-12-31 RX ADMIN — SODIUM CHLORIDE, PRESERVATIVE FREE 10 ML: 5 INJECTION INTRAVENOUS at 05:00

## 2022-12-31 RX ADMIN — BACITRACIN ZINC, NEOMYCIN, POLYMYXIN B: 400; 3.5; 5 OINTMENT TOPICAL at 09:43

## 2022-12-31 RX ADMIN — SERTRALINE HYDROCHLORIDE 50 MG: 50 TABLET ORAL at 09:29

## 2022-12-31 RX ADMIN — MEMANTINE HYDROCHLORIDE 5 MG: 10 TABLET ORAL at 09:29

## 2022-12-31 RX ADMIN — CYANOCOBALAMIN TAB 500 MCG 2000 MCG: 500 TAB at 09:29

## 2022-12-31 RX ADMIN — ACETAMINOPHEN 1000 MG: 500 TABLET ORAL at 00:19

## 2022-12-31 RX ADMIN — CETIRIZINE HYDROCHLORIDE 10 MG: 10 TABLET, FILM COATED ORAL at 00:19

## 2022-12-31 RX ADMIN — SODIUM CHLORIDE, PRESERVATIVE FREE 10 ML: 5 INJECTION INTRAVENOUS at 14:25

## 2022-12-31 RX ADMIN — Medication 1 TABLET: at 00:19

## 2022-12-31 RX ADMIN — FUROSEMIDE 20 MG: 40 TABLET ORAL at 09:28

## 2022-12-31 RX ADMIN — SODIUM CHLORIDE 125 ML/HR: 9 INJECTION, SOLUTION INTRAVENOUS at 14:21

## 2022-12-31 RX ADMIN — SENNOSIDES AND DOCUSATE SODIUM 1 TABLET: 50; 8.6 TABLET ORAL at 22:18

## 2022-12-31 RX ADMIN — ASPIRIN 325 MG: 325 TABLET, COATED ORAL at 22:17

## 2022-12-31 RX ADMIN — ALLOPURINOL 300 MG: 100 TABLET ORAL at 09:28

## 2022-12-31 RX ADMIN — BACITRACIN ZINC, NEOMYCIN, POLYMYXIN B: 400; 3.5; 5 OINTMENT TOPICAL at 14:24

## 2022-12-31 RX ADMIN — MONTELUKAST 10 MG: 10 TABLET, FILM COATED ORAL at 09:29

## 2022-12-31 RX ADMIN — CELECOXIB 200 MG: 200 CAPSULE ORAL at 09:29

## 2022-12-31 RX ADMIN — MEROPENEM 1 G: 1 INJECTION, POWDER, FOR SOLUTION INTRAVENOUS at 22:17

## 2022-12-31 RX ADMIN — BUDESONIDE AND FORMOTEROL FUMARATE DIHYDRATE 2 PUFF: 160; 4.5 AEROSOL RESPIRATORY (INHALATION) at 09:10

## 2022-12-31 RX ADMIN — BUDESONIDE AND FORMOTEROL FUMARATE DIHYDRATE 2 PUFF: 160; 4.5 AEROSOL RESPIRATORY (INHALATION) at 20:58

## 2022-12-31 RX ADMIN — CETIRIZINE HYDROCHLORIDE 10 MG: 10 TABLET, FILM COATED ORAL at 22:17

## 2022-12-31 RX ADMIN — SENNOSIDES AND DOCUSATE SODIUM 1 TABLET: 50; 8.6 TABLET ORAL at 00:19

## 2022-12-31 RX ADMIN — LEVOTHYROXINE, LIOTHYRONINE 60 MG: 9.5; 2.25 TABLET ORAL at 09:32

## 2022-12-31 RX ADMIN — ACETAMINOPHEN 1000 MG: 500 TABLET ORAL at 09:29

## 2022-12-31 RX ADMIN — SENNOSIDES AND DOCUSATE SODIUM 1 TABLET: 50; 8.6 TABLET ORAL at 09:28

## 2022-12-31 RX ADMIN — BACITRACIN ZINC, NEOMYCIN, POLYMYXIN B: 400; 3.5; 5 OINTMENT TOPICAL at 22:29

## 2022-12-31 RX ADMIN — SODIUM CHLORIDE, PRESERVATIVE FREE 10 ML: 5 INJECTION INTRAVENOUS at 22:17

## 2022-12-31 RX ADMIN — ASPIRIN 325 MG: 325 TABLET, COATED ORAL at 09:29

## 2022-12-31 RX ADMIN — CEFAZOLIN 2 G: 1 INJECTION, POWDER, FOR SOLUTION INTRAMUSCULAR; INTRAVENOUS at 09:48

## 2022-12-31 RX ADMIN — ASPIRIN 325 MG: 325 TABLET, COATED ORAL at 00:19

## 2022-12-31 RX ADMIN — SODIUM CHLORIDE 125 ML/HR: 9 INJECTION, SOLUTION INTRAVENOUS at 05:04

## 2022-12-31 RX ADMIN — ACETAMINOPHEN 1000 MG: 500 TABLET ORAL at 22:17

## 2022-12-31 NOTE — PROGRESS NOTES
Bedside and Verbal shift change report given to [address] [facility] (oncoming nurse) by Jennie Zhou RN  (offgoing nurse). Report included the following information SBAR, Kardex, Intake/Output, MAR, and Recent Results.

## 2022-12-31 NOTE — PROGRESS NOTES
Hospitalist Progress Note               Daily Progress Note: 12/31/2022      Subjective: The patient is seen for follow up. She is an 69-year-old female with a history of atrial fibrillation, CHF, dementia, hypertension, thyroid disease and asthma who was brought to the ED on 12/29 after a ground-level fall. She developed immediate right sided hip pain. In the ED, x-ray demonstrated an acute comminuted displaced intertrochanteric right femur fracture with joint effusion. Patient has been seen by orthopedics with plans for surgical repair today. She was been seen by pulmonology preoperatively. Her chest x-ray shows bilateral multifocal pneumonia, however.          Problem List:  Problem List as of 12/31/2022 Date Reviewed: 10/3/2021            Codes Class Noted - Resolved    Hip fracture ([facility] ICD-10-CM: S72.009A  ICD-9-CM: 820.8  12/29/2022 - Present        UTI (urinary tract infection) ICD-10-CM: N39.0  ICD-9-CM: 599.0  12/29/2022 - Present        Asthma attack ICD-10-CM: J45.901  ICD-9-CM: 493.92  12/14/2022 - Present        Status asthmaticus with COPD (chronic obstructive pulmonary disease) ([facility] ICD-10-CM: J44.9, J45.902  ICD-9-CM: 493.21  12/14/2022 - Present        Dementia ([facility] ICD-10-CM: F03.90  ICD-9-CM: 294.20  10/3/2021 - Present        Asthma ICD-10-CM: J45.909  ICD-9-CM: 493.90  10/3/2021 - Present        Ill-defined condition ICD-10-CM: R69  ICD-9-CM: 799.89  10/3/2021 - Present        Hypertension ICD-10-CM: I10  ICD-9-CM: 401.9  10/3/2021 - Present         Medications reviewed  Current Facility-Administered Medications   Medication Dose Route Frequency    0.9% sodium chloride infusion  125 mL/hr IntraVENous CONTINUOUS    sodium chloride (NS) flush 5-40 mL  5-40 mL IntraVENous Q8H    sodium chloride (NS) flush 5-40 mL  5-40 mL IntraVENous PRN    traMADoL (ULTRAM) tablet 50 mg  50 mg Oral Q6H PRN    morphine injection 1 mg  1 mg IntraVENous Q4H PRN    senna-docusate (PERICOLACE) 8.6-50 mg per tablet 1 Tablet  1 Tablet Oral BID    [START ON 1/1/2023] bisacodyL (DULCOLAX) suppository 10 mg  10 mg Rectal DAILY PRN    aspirin delayed-release tablet 325 mg  325 mg Oral BID    celecoxib (CELEBREX) capsule 200 mg  200 mg Oral DAILY    meropenem (MERREM) 1 g in 0.9% sodium chloride (MBP/ADV) 50 mL MBP  1 g IntraVENous Q8H    albuterol CONCENTRATE 2.5mg/0.5 mL neb soln  2.5 mg Nebulization Q4H PRN    allopurinoL (ZYLOPRIM) tablet 300 mg  300 mg Oral DAILY    cetirizine (ZYRTEC) tablet 10 mg  10 mg Oral QHS    cyanocobalamin (VITAMIN B12) tablet 2,000 mcg  2,000 mcg Oral DAILY    . PHARMACY TO SUBSTITUTE PER PROTOCOL (Reordered from: d-mannose (AZO D-Mannose) 500 mg cap)    Per Protocol    ergocalciferol capsule 50,000 Units  50,000 Units Oral Q7D    furosemide (LASIX) tablet 20 mg  20 mg Oral DAILY    lisinopriL (PRINIVIL, ZESTRIL) tablet 2.5 mg  2.5 mg Oral DAILY    memantine (NAMENDA) tablet 5 mg  5 mg Oral DAILY    metoprolol succinate (TOPROL-XL) XL tablet 50 mg  50 mg Oral DAILY    montelukast (SINGULAIR) tablet 10 mg  10 mg Oral DAILY    neomycin-bacitracin-polymyxin (NEOSPORIN) ointment   Topical QID    acidophilus-pectin, citrus tablet 1 Tablet  1 Tablet Oral BID    sertraline (ZOLOFT) tablet 50 mg  50 mg Oral DAILY    budesonide-formoteroL (SYMBICORT) 160-4.5 mcg/actuation HFA inhaler 2 Puff  2 Puff Inhalation BID    thyroid (Pork) (ARMOUR) tablet 60 mg  60 mg Oral DAILY    sodium chloride (NS) flush 5-40 mL  5-40 mL IntraVENous Q8H    sodium chloride (NS) flush 5-40 mL  5-40 mL IntraVENous PRN    polyethylene glycol (MIRALAX) packet 17 g  17 g Oral DAILY PRN    ondansetron (ZOFRAN ODT) tablet 4 mg  4 mg Oral Q8H PRN    Or    ondansetron (ZOFRAN) injection 4 mg  4 mg IntraVENous Q6H PRN    acetaminophen (TYLENOL) tablet 1,000 mg  1,000 mg Oral Q6H       Review of Systems:   A comprehensive review of systems was negative except for that written in the HPI.     Objective:   Physical Exam: Visit Vitals  BP (!) 86/47 (BP 1 Location: Right upper arm, BP Patient Position: At rest;Supine) Comment: nurse is aware   Pulse 61   Temp 98 °F (36.7 °C)   Resp 20   Wt 68.8 kg (151 lb 10.8 oz)   SpO2 97%   BMI 21.46 kg/m²    O2 Flow Rate (L/min): 3 l/min O2 Device: None (Room air)    Temp (24hrs), Av.3 °F (36.8 °C), Min:97.5 °F (36.4 °C), Max:99.1 °F (37.3 °C)    No intake/output data recorded.  190 -  0700  In: 800 [I.V.:800]  Out: 750 [Urine:700]    General:   Awake and alert   Lungs: Few rhonchi bilaterally at the bases   Chest wall:  No tenderness or deformity. Heart:  Regular rate and rhythm, S1, S2 normal, no murmur, click, rub or gallop. Abdomen:   Soft, non-tender. Bowel sounds normal. No masses,  No organomegaly. Extremities: Extremities normal, atraumatic, no cyanosis or edema. Skin: Skin color, texture, turgor normal. No rashes or lesions   Neurologic: CNII-XII intact. No gross focal deficits         Data Review:       Recent Days:  Recent Labs     22  0928 22  0243 22  0326   WBC  --  12.5* 9.9   HGB 9.9* 10.0* 12.4   HCT  --  30.3* 38.0   PLT  --  140* 190       Recent Labs     22  0928 22  0243 22  0326   * 137 138   K 4.7 4.6 4.5    102 101   CO2 26 28 29   * 126* 147*   BUN 27* 21* 25*   CREA 1.02 0.73 0.78   CA 8.4* 8.3* 8.7   MG  --  2.1  --    ALB  --   --  3.0*   TBILI  --   --  0.5   ALT  --   --  23   INR  --   --  1.0       No results for input(s): PH, PCO2, PO2, HCO3, FIO2 in the last 72 hours.     24 Hour Results:  Recent Results (from the past 24 hour(s))   GLUCOSE, POC    Collection Time: 22  6:53 PM   Result Value Ref Range    Glucose (POC) 93 65 - 100 mg/dL    Performed by [address] [place] [address], [facility]    Collection Time: 22  9:28 AM   Result Value Ref Range    Sodium 135 (L) 136 - 145 mmol/L    Potassium 4.7 3.5 - 5.1 mmol/L    Chloride 103 97 - 108 mmol/L    CO2 26 21 - 32 mmol/L    Anion gap 6 5 - 15 mmol/L    Glucose 122 (H) 65 - 100 mg/dL    BUN 27 (H) 6 - 20 mg/dL    Creatinine 1.02 0.55 - 1.02 mg/dL    BUN/Creatinine ratio 26 (H) 12 - 20      eGFR 53 (L) >60 ml/min/1.73m2    Calcium 8.4 (L) 8.5 - 10.1 mg/dL   HEMOGLOBIN    Collection Time: 12/31/22  9:28 AM   Result Value Ref Range    HGB 9.9 (L) 11.5 - 16.0 g/dL       XR FLUOROSCOPY UNDER 60 MINUTES   Final Result   Right hip ORIF in progress. Assessment:  Acute right-sided hip fracture    Bilateral multifocal community-acquired pneumonia   -Patient not hypoxic, not septic, clinically stable    Chronic asthma    Paroxysmal atrial fibrillation    Dementia    Hypothyroidism    Chronic systolic heart failure, EF 43%    Moderate pulmonary hypertension    Plan:  Acute right hip fracture  Taken to the operating room and repair with open reduction internal fixation by Dr. Sapna Hodge  PT OT consult  Most likely require rehab and discussed with family at the bedside  Increased as of the right leg may require CT if worsening  Repeat CBC    2. Multifocal community-acquired pneumonia  Continue meropenem due to allergies  Repeat chest x-ray    3. Dementia  Continue [place]    4. Essential hypertension  Continue lisinopril  Continue metoprolol    5. Paroxysmal atrial fibrillation  Continue metoprolol    6. Hypothyroidism  Continue [place]      Care Plan discussed with: Patient/Family    Disposition:     Total time spent with patient: 30 minutes.     Fidelina Jain PA-C

## 2022-12-31 NOTE — PROGRESS NOTES
Pulmonary and Critical Care progress note    Subjective:   Consult Note: 12/31/2022 @no control      Chief Complaint:   Chief Complaint   Patient presents with    Fall        This patient has been seen and evaluated at the request of Dr. Riaz Diaz and Dr. Sirisha Acuna    Patient seen and examined  Overnight events noted    Lying in bed comfortably  On room air  Awake and alert  No acute distress  S/p right hip surgery overnight    Review of Systems:  A comprehensive review of systems was negative except for that written in the HPI.        Current Facility-Administered Medications   Medication Dose Route Frequency Provider Last Rate Last Admin    0.9% sodium chloride infusion  125 mL/hr IntraVENous CONTINUOUS Gilles Sommers  mL/hr at 12/31/22 1421 125 mL/hr at 12/31/22 1421    sodium chloride (NS) flush 5-40 mL  5-40 mL IntraVENous Q8H Gilles Sommers MD   10 mL at 12/31/22 1425    sodium chloride (NS) flush 5-40 mL  5-40 mL IntraVENous PRN Gilles Sommers MD        traMADoL Dorann Thad) tablet 50 mg  50 mg Oral Q6H PRN Gilles Sommers MD        morphine injection 1 mg  1 mg IntraVENous Q4H PRN Gilles Sommers MD        senna-docusate (PERICOLACE) 8.6-50 mg per tablet 1 Tablet  1 Tablet Oral BID Gilles Sommers MD   1 Tablet at 12/31/22 0928    [START ON 1/1/2023] bisacodyL (DULCOLAX) suppository 10 mg  10 mg Rectal DAILY PRN Gilles Sommers MD        aspirin delayed-release tablet 325 mg  325 mg Oral BID Gilles Sommers MD   325 mg at 12/31/22 1997    celecoxib (CELEBREX) capsule 200 mg  200 mg Oral DAILY Gilles Sommers MD   200 mg at 12/31/22 0929    meropenem (MERREM) 1 g in 0.9% sodium chloride (MBP/ADV) 50 mL MBP  1 g IntraVENous Q8H Gilles Sommers  mL/hr at 12/31/22 1420 1 g at 12/31/22 1420    albuterol CONCENTRATE 2.5mg/0.5 mL neb soln  2.5 mg Nebulization Q4H PRN Gilles Sommers MD   2.5 mg at 12/31/22 0110    allopurinoL (ZYLOPRIM) tablet 300 mg  300 mg Oral DAILY Farhana Wynn MD   300 mg at 12/31/22 0715    cetirizine (ZYRTEC) tablet 10 mg  10 mg Oral QHS Farhana Wynn MD   10 mg at 12/31/22 0019    cyanocobalamin (VITAMIN B12) tablet 2,000 mcg  2,000 mcg Oral DAILY Farhana Wynn MD   2,000 mcg at 12/31/22 0929    . PHARMACY TO SUBSTITUTE PER PROTOCOL (Reordered from: d-mannose (AZO D-Mannose) 500 mg cap)    Per Protocol Farhana Wynn MD        ergocalciferol capsule 50,000 Units  50,000 Units Oral Q7D Farhana Wynn MD        furosemide (LASIX) tablet 20 mg  20 mg Oral DAILY Farhana Wynn MD   20 mg at 12/31/22 0928    lisinopriL (PRINIVIL, ZESTRIL) tablet 2.5 mg  2.5 mg Oral DAILY Farhana Wynn MD        [facility]) tablet 5 mg  5 mg Oral DAILY Farhana Wynn MD   5 mg at 12/31/22 8069    metoprolol succinate (TOPROL-XL) XL tablet 50 mg  50 mg Oral DAILY Farhana Wynn MD   50 mg at 12/30/22 1052    montelukast (SINGULAIR) tablet 10 mg  10 mg Oral DAILY Farhana Wynn MD   10 mg at 12/31/22 1146    neomycin-bacitracin-polymyxin (NEOSPORIN) ointment   Topical QID Farhana Wynn MD   Given at 12/31/22 1424    acidophilus-pectin, citrus tablet 1 Tablet  1 Tablet Oral BID Farhana Wynn MD   1 Tablet at 12/31/22 4656    sertraline (ZOLOFT) tablet 50 mg  50 mg Oral DAILY Farhana Wynn MD   50 mg at 12/31/22 0929    budesonide-formoteroL (SYMBICORT) 160-4.5 mcg/actuation HFA inhaler 2 Puff  2 Puff Inhalation BID Farhana Wynn MD   2 Puff at 12/31/22 8527    thyroid (Pork) (ARMOUR) tablet 60 mg  60 mg Oral DAILY Farhana yWnn MD   60 mg at 12/31/22 0932    sodium chloride (NS) flush 5-40 mL  5-40 mL IntraVENous Q8H Farhana Wynn MD   10 mL at 12/31/22 1425    sodium chloride (NS) flush 5-40 mL  5-40 mL IntraVENous PRN Farhana Wynn MD        polyethylene glycol (MIRALAX) packet 17 g  17 g Oral DAILY PRN Farhana Wynn MD        ondansetron (ZOFRAN ODT) tablet 4 mg  4 mg Oral Q8H PRN Hermelindo Munson MD        Or    ondansetron [facility]) injection 4 mg  4 mg IntraVENous Q6H PRN Hermelindo Munson MD        acetaminophen (TYLENOL) tablet 1,000 mg  1,000 mg Oral Q6H Hermelindo Munson MD   1,000 mg at 22 7726          Allergies   Allergen Reactions    Cipro [Ciprofloxacin Hcl] Other (comments)    Ketek [Telithromycin] Other (comments)    Mobic [Meloxicam] Other (comments)    Penicillin Other (comments)    Shrimp Extract Other (comments)           Objective:     Blood pressure (!) 91/53, pulse 77, temperature 98.1 °F (36.7 °C), resp. rate 20, weight 68.8 kg (151 lb 10.8 oz), SpO2 99 %. Temp (24hrs), Av.3 °F (36.8 °C), Min:97.5 °F (36.4 °C), Max:99.1 °F (37.3 °C)      Intake and Output:  Current Shift: No intake/output data recorded. Last 3 Shifts:  1901 -  0700  In: 800 [I.V.:800]  Out: 750 [Urine:700]    Intake/Output Summary (Last 24 hours) at 2022 1620  Last data filed at 2022 2239  Gross per 24 hour   Intake 800 ml   Output 400 ml   Net 400 ml          Physical Exam:     General: Lying in bed comfortably, no acute distress  Eye: Reactive, symmetric  Throat and Neck: Supple  Lung: Reduced air entry bilaterally with prolonged exhalation but no wheezing. Occasional crackles. Heart: S1+S2. No murmurs  Abdomen: soft, non-tender. Bowel sounds normal. No masses; obese  Extremities: No edema  : Not done  Skin: No cyanosis  Neurologic: A & O x3.   Grossly nonfocal  Psychiatric: Appropriate affect; coherent      Lab/Data Review:    Recent Results (from the past 24 hour(s))   GLUCOSE, POC    Collection Time: 22  6:53 PM   Result Value Ref Range    Glucose (POC) 93 65 - 100 mg/dL    Performed by [address] [place] [address], [facility]    Collection Time: 22  9:28 AM   Result Value Ref Range    Sodium 135 (L) 136 - 145 mmol/L    Potassium 4.7 3.5 - 5.1 mmol/L    Chloride 103 97 - 108 mmol/L    CO2 26 21 - 32 mmol/L    Anion gap 6 5 - 15 mmol/L    Glucose 122 (H) 65 - 100 mg/dL    BUN 27 (H) 6 - 20 mg/dL    Creatinine 1.02 0.55 - 1.02 mg/dL    BUN/Creatinine ratio 26 (H) 12 - 20      eGFR 53 (L) >60 ml/min/1.73m2    Calcium 8.4 (L) 8.5 - 10.1 mg/dL   HEMOGLOBIN    Collection Time: 12/31/22  9:28 AM   Result Value Ref Range    HGB 9.9 (L) 11.5 - 16.0 g/dL       XR FLUOROSCOPY UNDER 60 MINUTES   Final Result   Right hip ORIF in progress. CT Results  (Last 48 hours)      None              Assessment:     1. Bilateral pneumonia  2. Asthma  3. Right hip fracture  4. Preoperative clearance  5. UTI  6. Hypertension  7.   History of dementia    Plan:     Patient admitted to the hospital  Will be watched here closely    Currently on room air  Use supplemental oxygen if needed to keep saturation above 92%    Chest x-ray shows bilateral patchy infiltrates suspicious for pneumonia or volume excess  Continue antibiotics  Culture sent  Further changes in antibiotics based on clinical response and culture results    Patient has a acute right hip fracture  Appreciate Ortho input  S/p surgery 12/30  Antibiotics for UTI    Continue with her medications  Continue medications for gout    DVT and GI prophylaxis    Questions of patient were answered at bedside in detail  Case discussed in detail with RN, RT, and care team  Thank you for involving me in the care of this patient  I will follow with you closely during hospitalization    Arlyn Jay MD  [facility]  12/31/2022  11:25 AM

## 2022-12-31 NOTE — ANESTHESIA PROCEDURE NOTES
Spinal Block    Start time: 12/30/2022 8:56 PM  End time: 12/30/2022 8:59 PM  Performed by: Breana Manzano MD  Authorized by: Breana Manzano MD     Pre-procedure:   Indications: at surgeon's request and primary anesthetic  Preanesthetic Checklist: patient identified, risks and benefits discussed, anesthesia consent, site marked, patient being monitored and timeout performed      Spinal Block:   Patient Position:  Left lateral decubitus  Prep Region:  Lumbar  Prep: chlorhexidine and patient draped      Location:  L3-4  Technique:  Single shot  Local: bupivacaine 0.75% in dextrose 8.25% preserv-free (SENSORCAINE) Intrathecal - Intrathecal   12 mg - 12/30/2022 8:59:00 PM  Local Dose (mL):  1.6  Med Admin Time: 12/30/2022 8:59 PM    Needle:   Needle Type:  Pencil-tip  Needle Gauge:  25 G  Attempts:  1      Events: CSF confirmed, no blood with aspiration and no paresthesia        Assessment:  Insertion:  Uncomplicated  Patient tolerance:  Patient tolerated the procedure well with no immediate complications

## 2022-12-31 NOTE — OP NOTES
Operative Note    Patient: Keegan Sparks  YOB: 1934  MRN: [id number]    Date of Procedure: 12/30/2022     Pre-Op Diagnosis:  1) Intertrochanteric fracture right hip, comminuted with greater trochanteric and posteromedial comminution  2) Dementia  3) CHF  4) Asthma  5) Pneumonia    Post-Op Diagnosis: Same as preoperative diagnosis. Procedure(s):  Open treatment with internal fixation of right femur intertrochanteric fracture with intramedullary device    Surgeon(s):  Apolinar Rob MD    Surgical Assistant: Thereasa Schilder    Anesthesia: General    Estimated Blood Loss (mL):  less than 318     Complications: None    Specimens: * No specimens in log *     Implants: From High View gamma nail system  1) 125° x 11 mm x 180 mm short gamma nail  2) 95 mm sliding hip screw  3) setscrew placed in dynamic mode  4) 35 mm static distal locking screw    Implant Name Type Inv. Item Serial No.  Lot No. LRB No. Used Action   NAIL KT TROCH 125D [id number] TI -- STRL GAMMA 3 - SNA  NAIL KT TROCH 125D [id number] TI -- STRL GAMMA 3 NA [employer] App [id number] Right 1 Implanted   SCR BNE LCK T2 FT 5X35MM TI -- STRL - [id number]  SCR BNE LCK T2 FT 5X35MM TI -- STRL  [employer]TOP[employer]WD [id number] Right 1 Implanted   SCR BNE LAG GAMMA 3 10.5X95MM -- TI STRL - [id number]  SCR BNE LAG GAMMA 3 10.5X95MM -- TI STRL  [employer]_ [id number] Right 1 Implanted     Preop Note: The patient is an 80-year-old lady who presented with the above noted injury at the [facility] emergency room yesterday. She was admitted the medical service, and underwent medical evaluation and clearance. The patient and family were counseled about the nature of the injury, its natural history and treatment options.  They wished to proceed with open repair of the fracture with internal fixation, with the goal of facilitating mobility and early ambulation, and to mitigate the complications of prolonged recumbency and fracture instability. Findings: Stable near anatomic reduction obtained, with satisfactory hardware placement and fixation. Detailed Description of Procedure: The patient and operative site were identified in the preoperative holding area. After induction of anesthesia the patient was positioned supine on the fracture table. Intravenous cefazolin were administered. Fluoroscopy was positioned appropriately. Fracture reduction was obtained with traction, and manipulation with rotation. Satisfactory alignment was noted. Standard prepping and draping of the right hip was performed into a surgical field. After timeout was called, I used biplanar fluoroscopy to localize the tip of the greater trochanter, and made a short incision proximal and lateral to it. The deep fascia was divided in line with the incision. The greater trochanter was localized. A cannulated trochanteric awl was now introduced into the proximal femur biplanar fluoroscopic assistance, and accurately optimizing the entry point. The guidewire was inserted into the femoral canal, and a one-step 15 mm reamer was passed to the lesser trochanter. The chosen intramedullary device was now passed over the guidewire and fine-tuned in position. The guidewire was inserted into the femoral head, and accurate positioning was performed. Cannulated reaming was performed, followed by insertion of the 95 mm sliding screw. The traction was released and interfragmentary compression was obtained with the tensioning bolt. The setscrew was now inserted in the dynamic mode. This was followed by standard distal static locking of the implant with the aiming device. The insertion handle was disengaged and fluoroscopic images were obtained and saved, confirming near anatomic fracture reduction as well as satisfactory implant placement. The wound was thoroughly irrigated and closure was performed in layers.  Subcutaneous and skin closure was performed, along with infiltration of soft tissues with local anesthetic. Sterile dressings were applied. The patient tolerated the procedure well and was transferred to the recovery room in stable condition. Postoperatively, the patient will be allowed full weightbearing as tolerated, with a standard hip fracture rehabilitation protocol.     Electronically Signed by Mikhail Lawler MD on 12/30/2022 at 10:41 PM

## 2022-12-31 NOTE — PROGRESS NOTES
Progress Note  Date:2022       Room:[facility]  Patient Arun Sparks     YOB: 1934     Age:88 y.o. Subjective    Subjective   Patient underwent open reduction and internal fixation of right hip intertrochanteric fracture on 2022. Patient is postop day #1. Patient had no complaints today. She was sitting up in the chair comfortably. Nursing team reported episode of hypotension when patient stood up with physical therapy today. Review of Systems  Objective         Vitals Last 24 Hours:  TEMPERATURE:  Temp  Av.3 °F (36.8 °C)  Min: 97.5 °F (36.4 °C)  Max: 99.1 °F (37.3 °C)  RESPIRATIONS RANGE: Resp  Av.9  Min: 14  Max: 24  PULSE OXIMETRY RANGE: SpO2  Av.2 %  Min: 88 %  Max: 100 %  PULSE RANGE: Pulse  Av.4  Min: 61  Max: 106  BLOOD PRESSURE RANGE: Systolic (67UZB), YSU:55 , Min:86 , EFF:829   ; Diastolic (20FCZ), JOL:95, Min:37, Max:96    I/O (24Hr): Intake/Output Summary (Last 24 hours) at 2022 1701  Last data filed at 2022 1645  Gross per 24 hour   Intake 800 ml   Output 650 ml   Net 150 ml     Objective  Vitals were reviewed. Patient was sitting up comfortably in a chair. Pleasantly confused, smiling and comfortable, having her lunch. Right hip showed clean and dry dressing. Thigh was nontender with mild swelling. Calf was nontender and without swelling. Ankle and foot motors were intact.     Labs/Imaging/Diagnostics    Labs:  CBC:  Recent Labs     22  0326   WBC  --  12.5* 9.9   RBC  --  3.31* 4.26   HGB 9.9* 10.0* 12.4   HCT  --  30.3* 38.0   MCV  --  91.5 89.2   RDW  --  14.8* 14.9*   PLT  --  140* 190     CHEMISTRIES:  Recent Labs     22  0243 22  0326   * 137 138   K 4.7 4.6 4.5    102 101   CO2 26 28 29   BUN 27* 21* 25*   CA 8.4* 8.3* 8.7   MG  --  2.1  --    PT/INR:  Recent Labs     22  0326   INR 1.0     APTT:No results for input(s): APTT in the last 72 hours. LIVER PROFILE:  Recent Labs     12/29/22  0326   AST 20   ALT 23     Lab Results   Component Value Date/Time    ALT (SGPT) 23 12/29/2022 03:26 AM    AST (SGOT) 20 12/29/2022 03:26 AM    Alk. phosphatase 85 12/29/2022 03:26 AM    Bilirubin, total 0.5 12/29/2022 03:26 AM       Imaging Last 24 Hours:  XR FLUOROSCOPY UNDER 60 MINUTES    Result Date: 12/30/2022  INTERPRETATION PROVIDED FOR COMPLIANCE ONLY AT NO CHARGE FINDINGS: Intraoperative spot radiographs of the right hip were obtained during open reduction and internal fixation of an intertrochanteric fracture. No operative complication is evident. Right hip ORIF in progress. Assessment//Plan   Active Problems:    Hip fracture ([address] (12/29/2022)      UTI (urinary tract infection) (12/29/2022)      Assessment & Plan:    No evidence of immediate postoperative complications. Patient is improved following surgical treatment of right hip intertrochanteric fracture with ORIF, postop day #1. Continue with PT and OT. Gait training and transfers, ADLs and assist devices. Patient has good family support. Case management for discharge planning. Consider SNF versus home with home care and home. Patient is cleared for discharge from orthopedic viewpoint. Follow-up in 2 weeks at [address] [facility] [phone] with Dr. Ernie Parker.     Electronically signed by Ramon Felder MD on 12/31/2022 at 5:01 PM

## 2022-12-31 NOTE — ANESTHESIA POSTPROCEDURE EVALUATION
Procedure(s):  RIGHT FEMUR GAMMA NAIL INSERTION. spinal    Anesthesia Post Evaluation      Multimodal analgesia: multimodal analgesia used between 6 hours prior to anesthesia start to PACU discharge  Patient location during evaluation: PACU  Patient participation: complete - patient cannot participate  Level of consciousness: awake  Pain score: 0  Pain management: adequate  Airway patency: patent  Anesthetic complications: no  Cardiovascular status: acceptable  Respiratory status: acceptable  Hydration status: acceptable  Post anesthesia nausea and vomiting:  controlled  Final Post Anesthesia Temperature Assessment:  Normothermia (36.0-37.5 degrees C)      INITIAL Post-op Vital signs:   Vitals Value Taken Time   /53 12/30/22 2250   Temp 37 °C (98.6 °F) 12/30/22 2244   Pulse 95 12/30/22 2252   Resp 23 12/30/22 2252   SpO2 81 % 12/30/22 2248   Vitals shown include unvalidated device data.

## 2022-12-31 NOTE — PROGRESS NOTES
PHYSICAL THERAPY EVALUATION  Patient: Hiren Su (27 y.o. female)  Date: 12/31/2022  Primary Diagnosis: Hip fracture ([facility] [address] [S72.009A]  UTI (urinary tract infection) [N39.0]  Procedure(s) (LRB):  RIGHT FEMUR GAMMA NAIL INSERTION (Right) 1 Day Post-Op   Precautions: Fall ,WBAT RLE     ASSESSMENT  Pt is a 80 y.o. female admitted on 12/29/2022 for fall; pt currently being treated for ORIF RT hip with WBAT RLE . Pt semi supine and eating breakfast for 1st attempt. 2nd attempt patient semi supine and resting upon PT/OT arrival, agreeable to evaluation. Pt A&O x 2. Patient is an assisted living memory unit resident and able to amb with rollator. Based on the objective data described, the patient presents with generalized weakness, impaired functional mobility, impaired amb, impaired balance, and decreased endurance to activities. (See below for objective details and assist levels). Patient required mod to max assist for all mobility due to some pain and cognitive impairment. Patient cooperative. Able to stand with therapy and having difficulty taking steps after couple steps. Patient started to pass out and eyes open but glazing not connecting. Rapid response called. Patient was assisted to recliner and slowly started responding. BP 85/45 rapid team came but patient was more responsive by than.sitting BP taken was 114/63 . RN came with rapid resp code. Patient Overall pt tolerated session fair today with therapy. Pt will benefit from continued skilled PT to address above deficits and return to PLOF. Current PT DC recommendation Ruddy Frey once medically appropriate.     Current Level of Function Impacting Discharge (mobility/balance): decreased rom, strength,endurance and activities    Other factors to consider for discharge: SNF      PLAN :  Recommendations and Planned Interventions: bed mobility training, transfer training, gait training, therapeutic exercises, patient and family training/education, and therapeutic activities      Recommend for staff: Out of bed to chair for meals, Encourage HEP in prep for ADLs/mobility, and Frequent repositioning to prevent skin breakdown    Frequency/Duration: Patient will be followed by physical therapy:  3-5x/week to address goals. Recommendation for discharge: (in order for the patient to meet his/her long term goals)  Ruddy Frey    This discharge recommendation:  Has been made in collaboration with the attending provider and/or case management    IF patient discharges home will need the following DME: bedside commode, shower chair, rolling walker, and wheelchair         SUBJECTIVE:   Patient stated I am ok.     OBJECTIVE DATA SUMMARY:   HISTORY:    Past Medical History:   Diagnosis Date    Anxiety     Arthropathy     Asthma     Atrial fibrillation (HCC)     CHF (congestive heart failure) ([facility] [address]     Community acquired pneumonia     Dementia ([facility] [address]     Gout     Hypertension     Ill-defined condition     dementia    Thyroid disease      Past Surgical History:   Procedure Laterality Date    HX ORTHOPAEDIC      left ankle orif    HX TONSILLECTOMY         Home Situation  Home Environment: Assisted living (memory care unit)  One/Two Story Residence: One story  Living Alone: No  Support Systems: Assisted Living  Patient Expects to be Discharged to[de-identified] Skilled nursing facility  Current DME Used/Available at Home: didi Carter    EXAMINATION/PRESENTATION/DECISION MAKING:   Critical Behavior:  Neurologic State: Alert, Confused  Orientation Level: Oriented to person  Cognition: Decreased command following     Hearing: Auditory  Auditory Impairment: None  Skin:  intact  Edema:   Range Of Motion:  AROM: Generally decreased, functional                       Strength:    Strength: Generally decreased, functional                       Functional Mobility:  Bed Mobility:  Rolling:  Moderate assistance;Maximum assistance;Assist x2  Supine to Sit: Maximum assistance;Assist x2     Scooting: Moderate assistance;Maximum assistance;Assist x2  Transfers:  Sit to Stand: Moderate assistance;Assist x2  Stand to Sit: Moderate assistance;Assist x2        Bed to Chair: Moderate assistance;Assist x2              Balance:   Sitting: Impaired;High guard; With support  Sitting - Static: Fair (occasional)  Sitting - Dynamic: Fair (occasional)  Standing: Impaired;Pull to stand; With support  Standing - Static: Fair;Constant support  Standing - Dynamic : Fair;Constant support  Ambulation/Gait Training:  Distance (ft): 3 Feet (ft)  Assistive Device: Gait belt;Walker, rolling  Ambulation - Level of Assistance: Moderate assistance;Assist x2     Gait Description (WDL): Exceptions to WDL     Right Side Weight Bearing: As tolerated                                         Therapeutic Exercises:   AP, heel slides    Functional Measure:  [employer] AM-PAC 6 Clicks         Basic Mobility Inpatient Short Form  How much difficulty does the patient currently have. .. Unable A Lot A Little None   1. Turning over in bed (including adjusting bedclothes, sheets and blankets)? [] 1   [x] 2   [] 3   [] 4   2. Sitting down on and standing up from a chair with arms ( e.g., wheelchair, bedside commode, etc.)   [] 1   [x] 2   [] 3   [] 4   3. Moving from lying on back to sitting on the side of the bed? [] 1   [x] 2   [] 3   [] 4          How much help from another person does the patient currently need. .. Total A Lot A Little None   4. Moving to and from a bed to a chair (including a wheelchair)? [] 1   [x] 2   [] 3   [] 4   5. Need to walk in hospital room? [x] 1   [] 2   [] 3   [] 4   6. Climbing 3-5 steps with a railing? [x] 1   [] 2   [] 3   [] 4   © 2007, Trustees of [employer], under license to [employer].  All rights reserved     Score:  Initial: 10/24 Most Recent: X (Date: 12/31/2022 )   Interpretation of Tool:  Represents activities that are increasingly more difficult (i.e. Bed mobility, Transfers, Gait). Score 24 23 22-20 19-15 14-10 9-7 6   Modifier CH CI CJ CK CL CM CN         Physical Therapy Evaluation Charge Determination   History Examination Presentation Decision-Making   LOW Complexity : Zero comorbidities / personal factors that will impact the outcome / POC LOW Complexity : 1-2 Standardized tests and measures addressing body structure, function, activity limitation and / or participation in recreation  LOW Complexity : Stable, uncomplicated  Other outcome measures [facility] 6  10/24      Based on the above components, the patient evaluation is determined to be of the following complexity level: LOW     Pain Ratin/10     Activity Tolerance:   Fair    After treatment patient left in no apparent distress:   Bed locked and in lowest position Sitting in chair, Call bell within reach, and Bed / chair alarm activated . GOALS:    Problem: Mobility Impaired (Adult and Pediatric)  Goal: *Acute Goals and Plan of Care (Insert Text)  Description: Patient stated goal: get better  Patient will move from supine to sit and sit to supine , scoot up and down, and roll side to side in bed with minimal assistance/contact guard assist within 7 day(s). Patient will transfer from bed to chair and chair to bed with minimal assistance/contact guard assist using the least restrictive device within 7 day(s). Patient will improve static standing balance to minimal assistance within 1 week(s). Patient will ambulate 20 feet with minimal assistance with least restrictive device within 1 weeks. Outcome: Progressing Towards Goal       COMMUNICATION/EDUCATION:   The patients plan of care was discussed with: Occupational therapy assistant, Registered nurse, and Case management. Fall prevention education was provided and the patient/caregiver indicated understanding., Patient/family have participated as able in goal setting and plan of care. , and Patient/family agree to work toward stated goals and plan of care.          Thank you for this referral.  Ekta Esquivel, PT   Time Calculation: 40 mins

## 2022-12-31 NOTE — PROGRESS NOTES
OCCUPATIONAL THERAPY EVALUATION  Patient: Jay Hayes (33 y.o. female)  Date: 12/31/2022  Primary Diagnosis: Hip fracture ([address] [S72.009A]  UTI (urinary tract infection) [N39.0]  Procedure(s) (LRB):  RIGHT FEMUR GAMMA NAIL INSERTION (Right) 1 Day Post-Op   Precautions: fall risk, WBAT RLE       ASSESSMENT  Pt is a 80 y.o. female presenting to [facility] on 12/29 with c/o sharp R hip pain s/p fall. Pt is s/p ORIF of R femur intertrochanteric fx w/ intramedullary device; POD #1. Pt received semi-supine in bed upon arrival w/ family in room, AXO x1 (disoriented to place, time, and situation), and agreeable to OT/PT evaluation. Pt educated on WBAT of RLE prior to ambulation; pt verbalized understanding. Based on current observations, pt presents with deficits in generalized strength/AROM, bed mobility, static/dynamic sitting balance, static/dynamic standing balance (see PT note for gait details), functional activity tolerance, confusion, and pain currently impacting overall performance of ADLs and functional transfers/mobility (see below for objective details and assist levels). Overall, pt tolerates session fair with c/o 5/10 pain in RLE. She req'd max A x2 for sup>sit transfer and mod A x2 to scoot toward [address] [facility] [address]. She presented w/ L lateral lean to reduce pressure off of RLE; req'd physical//verbal cues go toward midline. Pt req'd mod A x2 for STS using RW. She completed bed<>chair transfer w/ min-mod A x2 using RW; during transfer, pt noted to go quite. She returned was returned to chair and pt not responding. Rapid response called and BP was assessed; initially in chair BP was 87/45 and ~1 min later BP increased to 114/63. Pt became alert and talking quickly after sitting in chair and Nsg came in for further assessment. Pt would benefit from continued skilled OT services to address current impairments and improve IND and safety with self cares and functional transfers/mobility.  Current OT d/c recommendation Skilled Nursing Facility once medically appropriate to maximize safety/IND prior to discharging back to facility. Other factors to consider for discharge: family/social support, DME, time since onset, severity of deficits, functional baseline     Patient will benefit from skilled therapy intervention to address the above noted impairments. PLAN :  Recommendations and Planned Interventions: self care training, functional mobility training, therapeutic exercise, balance training, therapeutic activities, endurance activities, and patient education    Recommend with staff: bed<>chair transfer x2     Recommend next session: LB dressing , LB bathing, and Standing grooming    Frequency/Duration: Patient will be followed by occupational therapy:  3-5x/week to address goals. Recommendation for discharge: (in order for the patient to meet his/her long term goals)  Ruddy Frey    This discharge recommendation:  Has been made in collaboration with the attending provider and/or case management    IF patient discharges home will need the following DME: TBD at next placement       SUBJECTIVE:   Patient stated It hurts.     OBJECTIVE DATA SUMMARY:   HISTORY:   Past Medical History:   Diagnosis Date    Anxiety     Arthropathy     Asthma     Atrial fibrillation (HCC)     CHF (congestive heart failure) ([facility] [address]     Community acquired pneumonia     Dementia ([facility]     Gout     Hypertension     Ill-defined condition     dementia    Thyroid disease      Past Surgical History:   Procedure Laterality Date    HX ORTHOPAEDIC      left ankle orif    HX TONSILLECTOMY         Per pt:   Home Situation  Home Environment: Assisted living (memory care unit)  One/Two Story Residence: One story  Living Alone: No  Support Systems: Assisted Living  Patient Expects to be Discharged to[de-identified] (P) Skilled nursing facility  Current DME Used/Available at Home: didi Carter      EXAMINATION OF PERFORMANCE DEFICITS:  Cognitive/Behavioral Status:  Neurologic State: Alert;Confused  Orientation Level: Oriented to person  Cognition: Follows commands           Hearing: Auditory  Auditory Impairment: None      Range of Motion:  AROM: Generally decreased, functional                         Strength:  Strength: Generally decreased, functional                Coordination:     Fine Motor Skills-Upper: Left Intact; Right Intact    Gross Motor Skills-Upper: Left Intact; Right Intact        Balance:  Sitting: Impaired;High guard; Without support  Sitting - Static: Fair (occasional)  Sitting - Dynamic: Fair (occasional)  Standing: Impaired; With support  Standing - Static: Fair;Constant support  Standing - Dynamic : Fair;Constant support    Functional Mobility and Transfers for ADLs:  Bed Mobility:  Supine to Sit: Maximum assistance;Assist x2  Scooting: Moderate assistance;Assist x2    Transfers:  Sit to Stand: Moderate assistance;Assist x2  Stand to Sit: Moderate assistance;Assist x2  Bed to Chair: Minimum assistance;Assist x2; Moderate assistance  Assistive Device : Walker, rolling      ADL Intervention and task modifications:                           Lower Body Dressing Assistance  Socks: Total assistance (dependent)              Therapeutic Exercise:  Pt will benefit from BUE HEP to improve participation in ADLs and mobility. Plan will be initiated at next session. Functional Measure:    [employer] AM-PAC \"6 Clicks\"                                                       Daily Activity Inpatient Short Form  How much help from another person does the patient currently need. .. Total; A Lot A Little None   1. Putting on and taking off regular lower body clothing? [x]  1 []  2 []  3 []  4   2. Bathing (including washing, rinsing, drying)? []  1 [x]  2 []  3 []  4   3. Toileting, which includes using toilet, bedpan or urinal? [x] 1 []  2 []  3 []  4   4. Putting on and taking off regular upper body clothing? []  1 []  2 [x]  3 []  4   5.   Taking care of personal grooming such as brushing teeth? []  1 []  2 [x]  3 []  4   6. Eating meals? []  1 []  2 []  3 [x]  4   © 2007, Trustees of [facility] [employer], under license to [employer]. All rights reserved     Score: 14/24     Interpretation of Tool:  Represents clinically-significant functional categories (i.e. Activities of daily living). Percentage of Impairment CH    0%   CI    1-19% CJ    20-39% CK    40-59% CL    60-79% CM    80-99% CN     100%   [facility]  Score 6-24 24 23 20-22 15-19 10-14 7-9 6     Occupational Therapy Evaluation Charge Determination   History Examination Decision-Making   LOW Complexity : Brief history review  LOW Complexity : 1-3 performance deficits relating to physical, cognitive , or psychosocial skils that result in activity limitations and / or participation restrictions  MEDIUM Complexity : Patient may present with comorbidities that affect occupational performnce. Miniml to moderate modification of tasks or assistance (eg, physical or verbal ) with assesment(s) is necessary to enable patient to complete evaluation       Based on the above components, the patient evaluation is determined to be of the following complexity level: LOW   Pain Rating:  See note above    Activity Tolerance:   Fair and requires rest breaks    After treatment patient left in no apparent distress:    Sitting in chair, Call bell within reach, and Caregiver / family present    COMMUNICATION/EDUCATION:   The patients plan of care was discussed with: Physical therapist and Registered nurse. Patient/family have participated as able in goal setting and plan of care. and Patient/family agree to work toward stated goals and plan of care. This patients plan of care is appropriate for delegation to [facility]. PT/OT sessions occurred together for increased safety of pt and clinician.      Thank you for this referral.  Vic Colbert OT  Time Calculation: 26 mins   Problem: Self Care Deficits Care Plan (Adult)  Goal: *Acute Goals and Plan of Care (Insert Text)  Description: FUNCTIONAL STATUS PRIOR TO ADMISSION: pt/family reports she was using rollator for ambulation and staff A w/ ADLs. Family reports 2 falls within the last 3 months. HOME SUPPORT: Pt lives at [facility] in memory care unit.     Occupational Therapy Goals  Initiated 12/31/2022    Pt stated goal I want to get better  Pt will be IND sup <> sit in prep for EOB ADLs  Pt will be mod I grooming standing sink side LRAD  Pt will be min A UB dressing sitting EOB/long sit   Pt will be min A LE dressing sitting EOB/long sit  Pt will be Mod I sit <>  prep for toileting LRAD  Pt will be Mod I toileting/toilet transfer/cloth mgmt LRAD  Pt will be IND following UE HEP in prep for self care tasks   Outcome: Not Met

## 2023-01-01 LAB
ALBUMIN SERPL-MCNC: 2.1 G/DL (ref 3.5–5)
ALBUMIN/GLOB SERPL: 0.8 {RATIO} (ref 1.1–2.2)
ALP SERPL-CCNC: 51 U/L (ref 45–117)
ALT SERPL-CCNC: 13 U/L (ref 12–78)
ANION GAP SERPL CALC-SCNC: 6 MMOL/L (ref 5–15)
AST SERPL W P-5'-P-CCNC: 31 U/L (ref 15–37)
BACTERIA SPEC CULT: NORMAL
BACTERIA SPEC CULT: NORMAL
BASOPHILS # BLD: 0 K/UL (ref 0–0.1)
BASOPHILS NFR BLD: 0 % (ref 0–1)
BILIRUB SERPL-MCNC: 0.5 MG/DL (ref 0.2–1)
BUN SERPL-MCNC: 36 MG/DL (ref 6–20)
BUN/CREAT SERPL: 32 (ref 12–20)
CA-I BLD-MCNC: 8 MG/DL (ref 8.5–10.1)
CHLORIDE SERPL-SCNC: 102 MMOL/L (ref 97–108)
CO2 SERPL-SCNC: 26 MMOL/L (ref 21–32)
CREAT SERPL-MCNC: 1.14 MG/DL (ref 0.55–1.02)
DIFFERENTIAL METHOD BLD: ABNORMAL
EOSINOPHIL # BLD: 0.2 K/UL (ref 0–0.4)
EOSINOPHIL NFR BLD: 2 % (ref 0–7)
ERYTHROCYTE [DISTWIDTH] IN BLOOD BY AUTOMATED COUNT: 15.4 % (ref 11.5–14.5)
GLOBULIN SER CALC-MCNC: 2.7 G/DL (ref 2–4)
GLUCOSE BLD STRIP.AUTO-MCNC: 143 MG/DL (ref 65–100)
GLUCOSE SERPL-MCNC: 109 MG/DL (ref 65–100)
HCT VFR BLD AUTO: 23 % (ref 35–47)
HGB BLD-MCNC: 7.7 G/DL (ref 11.5–16)
HGB BLD-MCNC: 7.7 G/DL (ref 11.5–16)
IMM GRANULOCYTES # BLD AUTO: 0 K/UL (ref 0–0.04)
IMM GRANULOCYTES NFR BLD AUTO: 0 % (ref 0–0.5)
LYMPHOCYTES # BLD: 1.3 K/UL (ref 0.8–3.5)
LYMPHOCYTES NFR BLD: 13 % (ref 12–49)
MCH RBC QN AUTO: 31.3 PG (ref 26–34)
MCHC RBC AUTO-ENTMCNC: 33.5 G/DL (ref 30–36.5)
MCV RBC AUTO: 93.5 FL (ref 80–99)
MONOCYTES # BLD: 0.6 K/UL (ref 0–1)
MONOCYTES NFR BLD: 6 % (ref 5–13)
NEUTS SEG # BLD: 8.2 K/UL (ref 1.8–8)
NEUTS SEG NFR BLD: 79 % (ref 32–75)
NRBC # BLD: 0 K/UL (ref 0–0.01)
NRBC BLD-RTO: 0 PER 100 WBC
PERFORMED BY, TECHID: ABNORMAL
PLATELET # BLD AUTO: 107 K/UL (ref 150–400)
PMV BLD AUTO: 10.8 FL (ref 8.9–12.9)
POTASSIUM SERPL-SCNC: 3.8 MMOL/L (ref 3.5–5.1)
PROT SERPL-MCNC: 4.8 G/DL (ref 6.4–8.2)
RBC # BLD AUTO: 2.46 M/UL (ref 3.8–5.2)
SODIUM SERPL-SCNC: 134 MMOL/L (ref 136–145)
SPECIAL REQUESTS,SREQ: NORMAL
SPECIAL REQUESTS,SREQ: NORMAL
WBC # BLD AUTO: 10.4 K/UL (ref 3.6–11)

## 2023-01-01 PROCEDURE — 97530 THERAPEUTIC ACTIVITIES: CPT

## 2023-01-01 PROCEDURE — 74011000258 HC RX REV CODE- 258: Performed by: ORTHOPAEDIC SURGERY

## 2023-01-01 PROCEDURE — 74011250637 HC RX REV CODE- 250/637: Performed by: ORTHOPAEDIC SURGERY

## 2023-01-01 PROCEDURE — 85025 COMPLETE CBC W/AUTO DIFF WBC: CPT

## 2023-01-01 PROCEDURE — 94640 AIRWAY INHALATION TREATMENT: CPT

## 2023-01-01 PROCEDURE — 74011250636 HC RX REV CODE- 250/636: Performed by: ORTHOPAEDIC SURGERY

## 2023-01-01 PROCEDURE — 74011000250 HC RX REV CODE- 250: Performed by: ORTHOPAEDIC SURGERY

## 2023-01-01 PROCEDURE — 80053 COMPREHEN METABOLIC PANEL: CPT

## 2023-01-01 PROCEDURE — 51798 US URINE CAPACITY MEASURE: CPT

## 2023-01-01 PROCEDURE — 65270000029 HC RM PRIVATE

## 2023-01-01 PROCEDURE — 85018 HEMOGLOBIN: CPT

## 2023-01-01 PROCEDURE — 36415 COLL VENOUS BLD VENIPUNCTURE: CPT

## 2023-01-01 PROCEDURE — 82962 GLUCOSE BLOOD TEST: CPT

## 2023-01-01 RX ADMIN — METOPROLOL SUCCINATE 50 MG: 50 TABLET, EXTENDED RELEASE ORAL at 10:33

## 2023-01-01 RX ADMIN — BACITRACIN ZINC, NEOMYCIN, POLYMYXIN B: 400; 3.5; 5 OINTMENT TOPICAL at 18:32

## 2023-01-01 RX ADMIN — FUROSEMIDE 20 MG: 40 TABLET ORAL at 10:31

## 2023-01-01 RX ADMIN — CYANOCOBALAMIN TAB 500 MCG 2000 MCG: 500 TAB at 10:33

## 2023-01-01 RX ADMIN — SERTRALINE HYDROCHLORIDE 50 MG: 50 TABLET ORAL at 10:32

## 2023-01-01 RX ADMIN — MONTELUKAST 10 MG: 10 TABLET, FILM COATED ORAL at 10:32

## 2023-01-01 RX ADMIN — MEMANTINE HYDROCHLORIDE 5 MG: 10 TABLET ORAL at 10:32

## 2023-01-01 RX ADMIN — SODIUM CHLORIDE, PRESERVATIVE FREE 10 ML: 5 INJECTION INTRAVENOUS at 05:10

## 2023-01-01 RX ADMIN — ASPIRIN 325 MG: 325 TABLET, COATED ORAL at 21:27

## 2023-01-01 RX ADMIN — BUDESONIDE AND FORMOTEROL FUMARATE DIHYDRATE 2 PUFF: 160; 4.5 AEROSOL RESPIRATORY (INHALATION) at 21:57

## 2023-01-01 RX ADMIN — Medication 1 TABLET: at 21:27

## 2023-01-01 RX ADMIN — MEROPENEM 1 G: 1 INJECTION, POWDER, FOR SOLUTION INTRAVENOUS at 12:32

## 2023-01-01 RX ADMIN — ACETAMINOPHEN 1000 MG: 500 TABLET ORAL at 21:27

## 2023-01-01 RX ADMIN — BUDESONIDE AND FORMOTEROL FUMARATE DIHYDRATE 2 PUFF: 160; 4.5 AEROSOL RESPIRATORY (INHALATION) at 08:43

## 2023-01-01 RX ADMIN — SODIUM CHLORIDE, PRESERVATIVE FREE 10 ML: 5 INJECTION INTRAVENOUS at 15:50

## 2023-01-01 RX ADMIN — ACETAMINOPHEN 1000 MG: 500 TABLET ORAL at 10:31

## 2023-01-01 RX ADMIN — ALLOPURINOL 300 MG: 100 TABLET ORAL at 10:33

## 2023-01-01 RX ADMIN — ACETAMINOPHEN 1000 MG: 500 TABLET ORAL at 15:46

## 2023-01-01 RX ADMIN — MEROPENEM 1 G: 1 INJECTION, POWDER, FOR SOLUTION INTRAVENOUS at 05:08

## 2023-01-01 RX ADMIN — LISINOPRIL 2.5 MG: 5 TABLET ORAL at 10:35

## 2023-01-01 RX ADMIN — CELECOXIB 200 MG: 200 CAPSULE ORAL at 10:32

## 2023-01-01 RX ADMIN — LEVOTHYROXINE, LIOTHYRONINE 60 MG: 9.5; 2.25 TABLET ORAL at 09:00

## 2023-01-01 RX ADMIN — ACETAMINOPHEN 1000 MG: 500 TABLET ORAL at 05:08

## 2023-01-01 RX ADMIN — SODIUM CHLORIDE, PRESERVATIVE FREE 10 ML: 5 INJECTION INTRAVENOUS at 21:28

## 2023-01-01 RX ADMIN — SENNOSIDES AND DOCUSATE SODIUM 1 TABLET: 50; 8.6 TABLET ORAL at 10:31

## 2023-01-01 RX ADMIN — BACITRACIN ZINC, NEOMYCIN, POLYMYXIN B: 400; 3.5; 5 OINTMENT TOPICAL at 15:49

## 2023-01-01 RX ADMIN — CETIRIZINE HYDROCHLORIDE 10 MG: 10 TABLET, FILM COATED ORAL at 21:27

## 2023-01-01 RX ADMIN — ASPIRIN 325 MG: 325 TABLET, COATED ORAL at 10:31

## 2023-01-01 RX ADMIN — SODIUM CHLORIDE, PRESERVATIVE FREE 10 ML: 5 INJECTION INTRAVENOUS at 17:40

## 2023-01-01 RX ADMIN — Medication 1 TABLET: at 10:31

## 2023-01-01 RX ADMIN — MEROPENEM 1 G: 1 INJECTION, POWDER, FOR SOLUTION INTRAVENOUS at 21:28

## 2023-01-01 NOTE — PROGRESS NOTES
Problem: Pain  Goal: *Control of Pain  Outcome: Progressing Towards Goal     Problem: Falls - Risk of  Goal: *Absence of Falls  Description: Document Shreya Fall Risk and appropriate interventions in the flowsheet.   Outcome: Progressing Towards Goal  Note: Fall Risk Interventions:  Mobility Interventions: Bed/chair exit alarm, PT Consult for mobility concerns, PT Consult for assist device competence, Strengthening exercises (ROM-active/passive)    Mentation Interventions: Bed/chair exit alarm, Reorient patient    Medication Interventions: Bed/chair exit alarm, Teach patient to arise slowly, Patient to call before getting OOB    Elimination Interventions: Bed/chair exit alarm, Call light in reach, Patient to call for help with toileting needs    History of Falls Interventions: Bed/chair exit alarm

## 2023-01-01 NOTE — PROGRESS NOTES
Problem: Mobility Impaired (Adult and Pediatric)  Goal: *Acute Goals and Plan of Care (Insert Text)  Description: Patient stated goal: get better  Patient will move from supine to sit and sit to supine , scoot up and down, and roll side to side in bed with minimal assistance/contact guard assist within 7 day(s). Patient will transfer from bed to chair and chair to bed with minimal assistance/contact guard assist using the least restrictive device within 7 day(s). Patient will improve static standing balance to minimal assistance within 1 week(s). Patient will ambulate 20 feet with minimal assistance with least restrictive device within 1 weeks. Outcome: Progressing Towards Goal   PHYSICAL THERAPY TREATMENT  Patient: Anita Gasca (89 y.o. female)  Date: 1/1/2023  Diagnosis: Hip fracture ([facility] [address] [S72.009A]  UTI (urinary tract infection) [N39.0] <principal problem not specified>  Procedure(s) (LRB):  RIGHT FEMUR GAMMA NAIL INSERTION (Right) 2 Days Post-Op  Precautions:  WBAT R LE  Chart, physical therapy assessment, plan of care and goals were reviewed. ASSESSMENT  Patient continues with skilled PT services and is progressing towards goals. Pt received semi supine  upon PT/OT  arrival, agreeable to session. BP checked prior to session supine 124/58 HR 97. Sitting on /73 HR 81(See below for objective details and assist levels). Overall pt tolerated session fair  today with mobility. Pt. Continues to require extensive assist of 2 for bed mobility and Tfs. Pt. Was able to ambulate with RW  a few feet today but fatigued easily. Pt. Required vcs for  proper walker and foot placement. Pt. Had a tendency to place walker too far ahead and needed vcs and assist  to keep closer to body. Pt. Impulsively sat in chair due to fatigue and pt. Required mod A X 2 to sit in chair due to pt.s chair not behind her. Pt. With poor safety awareness.  Will continue to benefit from skilled PT services, and will continue to progress as tolerated. Current Level of Function Impacting Discharge (mobility/balance): Min/Mod A X 2 for mobility    Other factors to consider for discharge: decreased mobility/A X 2 needed/poor safety awareness         PLAN :  Patient continues to benefit from skilled intervention to address the above impairments. Continue treatment per established plan of care to address goals. Recommend with staff: Out of bed to chair for meals, Encourage HEP in prep for ADLs/mobility, Frequent repositioning to prevent skin breakdown, and Use of bed/chair alarm for safety    Recommendation for discharge: (in order for the patient to meet his/her long term goals)  Ruddy Frey    This discharge recommendation:  Has been made in collaboration with the attending provider and/or case management    IF patient discharges home will need the following DME: to be determined (TBD)       SUBJECTIVE:   Patient stated I want to get into chair. \"    OBJECTIVE DATA SUMMARY:   Critical Behavior:  Neurologic State: Alert  Oriented X 4  Cognition: Follows commands and poor safety awareness     Functional Mobility Training:  Bed Mobility:  Rolling: Minimum assistance;Assist x2; Additional time  Supine to Sit: Moderate assistance;Assist x2; Additional time     Scooting: Moderate assistance;Assist x2; Additional time        Transfers:  Sit to Stand: Moderate assistance;Assist x2; Additional time  Stand to Sit: Moderate assistance;Assist x2; Additional time        Bed to Chair: Moderate assistance;Assist x2; Additional time                    Balance:  Sitting: Intact; With support  Sitting - Static: Good (unsupported)  Sitting - Dynamic: Fair (occasional)  Standing: Impaired; With support  Standing - Static: Constant support; Fair  Standing - Dynamic : Constant support; Fair  Ambulation/Gait Training:  Distance (ft): 6 Feet (ft)  Assistive Device: Walker, rolling  Ambulation - Level of Assistance: Minimal assistance;Assist x2     Gait Description (WDL): Exceptions to [facility] [place]           Base of Support: Narrowed     Speed/Alka: Slow  Step Length: Right shortened;Left shortened               Therapeutic Exercises:       EXERCISE   Sets   Reps   Active Active Assist   Passive Self ROM   Comments   Ankle Pumps  20 [x] [] [] []    Quad Sets/Glut Sets  15 [x] [] [] [] R LE   Hamstring Sets   [] [] [] []    Short Arc Quads  10 [] [x] [] [] R LE   Heel Slides  12 [x] [] [] [] R LE   Straight Leg Raises   [] [] [] []    Hip abd/add  12 [x] [] [] [] R LE   Long Arc Quads   [] [] [] []    Marching   [] [] [] []       [] [] [] []       Reviewed all LE exercises and instructed pt. To perform 2 times a day. Pt. Agreed and understood exercises. Pain Ratin/10 R hip with movement/4/10 post RX. Activity Tolerance:   Fair and requires rest breaks    After treatment patient left in no apparent distress:   Bed locked and returned to lowest position, Sitting in chair, Call bell within reach, Caregiver / family present, and Left in chair with son present. RN notified of session. COMMUNICATION/COLLABORATION:   The patients plan of care was discussed with: Occupational therapy assistant and Registered nurse. [place] with OT for increased safety and mobility for pt and clinician.      Yifan Gonzalez, PT   Time Calculation: 43 mins

## 2023-01-01 NOTE — PROGRESS NOTES
OCCUPATIONAL THERAPY TREATMENT  Patient: Hiren Su (48 y.o. female)  Date: 1/1/2023  Diagnosis: Hip fracture ([facility] [address] [S72.009A]  UTI (urinary tract infection) [N39.0] <principal problem not specified>  Procedure(s) (LRB):  RIGHT FEMUR GAMMA NAIL INSERTION (Right) 2 Days Post-Op  Precautions: FALL  Chart, occupational therapy assessment, plan of care, and goals were reviewed. ASSESSMENT  Pt continues with skilled OT services and is progressing towards goals. Pt received semi-supine in bed upon arrival, AXO x4 and agreeable to DOMINGUEZ/PTA tx at this time. Pt cooperative and demonstrated good effort during activities. Noted some improvement with bed mobility>EOB using bed rail, additional time with vc's for proper technique. Pt able to complete simple grooming EOB with fair dynamic sitting balance as pt req'd vc's to sit midline as pt leaned towards left side. Pt req'd mod A x2 for STS using RW/gait belt and min A x2 for bed>chair tf with additional time with vc's for posture, proper hand placement and RW mgmt for safety. Pt engaged in beth UE exercises with vc's for pacing and to maximize ROM, see grid below. Pain in R HIP/weakness continues to limit pt's performance in ADl'S and functional tf's. BP EOB, 124/58, HR 97 and tf 114/73, HR 81. Overall, pt continues to present with deficits in generalized strength/AROM, dynamic sitting balance, static/dynamic standing balance, pain and functional activity tolerance during performance of ADLs/mobility (see below for objective details and assist levels). Will continue to progress. Recommend d/c to SNF once medically appropriate. Other factors to consider for discharge: Time of onset, medical prognosis/diagnosis, severity of deficits, PLOF, functional baseline, home environment, and family support          PLAN :  Patient continues to benefit from skilled intervention to address the above impairments. Continue treatment per established plan of care.   to address goals. Recommend with staff: Out of bed to chair for meals, Encourage HEP in prep for ADLs/mobility, and Frequent repositioning to prevent skin breakdown    Recommend next session: Toileting    Recommendation for discharge: (in order for the patient to meet his/her long term goals)  Ruddy Tk    This discharge recommendation:  Has been made in collaboration with the attending provider and/or case management       IF patient discharges home will need the following DME: TBD       SUBJECTIVE:   Patient stated I can't hear you.     OBJECTIVE DATA SUMMARY:   Cognitive/Behavioral Status:  Neurologic State: Alert  Orientation Level: Oriented X4  Cognition: Follows commands;Poor safety awareness ([place])             Functional Mobility and Transfers for ADLs:  Bed Mobility:  Rolling: Minimum assistance;Assist x2; Additional time  Supine to Sit: Moderate assistance;Assist x2; Additional time  Scooting: Moderate assistance;Assist x2; Additional time    Transfers:  Sit to Stand: Moderate assistance;Assist x2; Additional time     Bed to Chair: Moderate assistance;Assist x2; Additional time    Balance:  Sitting: Intact; With support  Sitting - Static: Good (unsupported)  Sitting - Dynamic: Fair (occasional)  Standing: Impaired; With support  Standing - Static: Constant support; Fair  Standing - Dynamic : Constant support; Fair    ADL Intervention:  Feeding  Feeding Assistance: Set-up; Independent  Container Management: Set-up  Cutting Food: Independent  Utensil Management: Independent  Food to Mouth: Independent  Drink to Mouth:  Independent    Grooming  Grooming Assistance: Set-up  Position Performed: Seated edge of bed  Washing Face: Set-up  Washing Hands: Set-up         Exercise Sets Reps AROM AAROM PROM Self PROM Comments   Elbow E/F 1 10 [x] [] [] [] Chair level   Chest press 1 10 [x] [] [] []    Ceiling punches 1 10 [x] [] [] []         Pain:  7/10 R hip with movement and 4/10 resting in chair    Activity Tolerance:   Fair and requires rest breaks    After treatment patient left in no apparent distress:   Sitting in chair, Call bell within reach, and Caregiver / family present, bed locked and in lowest position    COMMUNICATION/COLLABORATION:   The patients plan of care was discussed with: Physical therapy assistant and Registered nurse. Co-tx with PTA for increased pt/clinician safety with OOB activity     ANGELICA Saldaña  Time Calculation: 43 mins     Problem: Self Care Deficits Care Plan (Adult)  Goal: *Acute Goals and Plan of Care (Insert Text)  Description: FUNCTIONAL STATUS PRIOR TO ADMISSION: pt/family reports she was using rollator for ambulation and staff A w/ ADLs. Family reports 2 falls within the last 3 months. HOME SUPPORT: Pt lives at [facility] in memory care unit.     Occupational Therapy Goals  Initiated 12/31/2022    Pt stated goal I want to get better  Pt will be IND sup <> sit in prep for EOB ADLs  Pt will be mod I grooming standing sink side LRAD  Pt will be min A UB dressing sitting EOB/long sit   Pt will be min A LE dressing sitting EOB/long sit  Pt will be Mod I sit <>  prep for toileting LRAD  Pt will be Mod I toileting/toilet transfer/cloth mgmt LRAD  Pt will be IND following UE HEP in prep for self care tasks   Outcome: Progressing Towards Goal

## 2023-01-01 NOTE — PROGRESS NOTES
Bedside shift change report given to [facility] and Althea Carranza RN (oncoming nurse) by Koko Lara RN (offgoing nurse). Report included the following information SBAR, Intake/Output, and MAR.

## 2023-01-01 NOTE — PROGRESS NOTES
Problem: Pressure Injury - Risk of  Goal: *Prevention of pressure injury  Description: Document Nirmal Scale and appropriate interventions in the flowsheet. Outcome: Progressing Towards Goal  Note: Pressure Injury Interventions:  Sensory Interventions: Float heels, Keep linens dry and wrinkle-free, Minimize linen layers, Turn and reposition approx. every two hours (pillows and wedges if needed)         Activity Interventions: Increase time out of bed, PT/OT evaluation    Mobility Interventions: Float heels, HOB 30 degrees or less, PT/OT evaluation, Turn and reposition approx. every two hours(pillow and wedges)    Nutrition Interventions: Document food/fluid/supplement intake, Offer support with meals,snacks and hydration    Friction and Shear Interventions: HOB 30 degrees or less, Minimize layers                Problem: Patient Education: Go to Patient Education Activity  Goal: Patient/Family Education  Outcome: Progressing Towards Goal     Problem: Pain  Goal: *Control of Pain  Outcome: Progressing Towards Goal     Problem: Patient Education: Go to Patient Education Activity  Goal: Patient/Family Education  Outcome: Progressing Towards Goal     Problem: Falls - Risk of  Goal: *Absence of Falls  Description: Document Shreya Fall Risk and appropriate interventions in the flowsheet.   Outcome: Progressing Towards Goal  Note: Fall Risk Interventions:  Mobility Interventions: Bed/chair exit alarm, OT consult for ADLs, PT Consult for mobility concerns    Mentation Interventions: Bed/chair exit alarm, Door open when patient unattended, Gait belt with transfers/ambulation, More frequent rounding    Medication Interventions: Bed/chair exit alarm    Elimination Interventions: Bed/chair exit alarm, Call light in reach    History of Falls Interventions: Bed/chair exit alarm, Door open when patient unattended         Problem: Patient Education: Go to Patient Education Activity  Goal: Patient/Family Education  Outcome: Progressing Towards Goal     Problem: Falls - Risk of  Goal: *Absence of Falls  Description: Document Gopal Orantes Fall Risk and appropriate interventions in the flowsheet.   Outcome: Progressing Towards Goal  Note: Fall Risk Interventions:  Mobility Interventions: Bed/chair exit alarm, OT consult for ADLs, PT Consult for mobility concerns    Mentation Interventions: Bed/chair exit alarm, Door open when patient unattended, Gait belt with transfers/ambulation, More frequent rounding    Medication Interventions: Bed/chair exit alarm    Elimination Interventions: Bed/chair exit alarm, Call light in reach    History of Falls Interventions: Bed/chair exit alarm, Door open when patient unattended         Problem: Patient Education: Go to Patient Education Activity  Goal: Patient/Family Education  Outcome: Progressing Towards Goal     Problem: Patient Education: Go to Patient Education Activity  Goal: Patient/Family Education  Outcome: Progressing Towards Goal     Problem: Patient Education: Go to Patient Education Activity  Goal: Patient/Family Education  Outcome: Progressing Towards Goal

## 2023-01-01 NOTE — PROGRESS NOTES
01/01/23 1743   Peripheral IV 01/01/23 Anterior;Right; Lower Forearm   Placement Date/Time: 01/01/23 1733   Number of Attempts: 1  Inserted By: SAÚL Ordonez RN  Present on Admission/Arrival: No  Size: (c) 22 G  Orientation: Anterior;Right; Lower  Location: Forearm   Site Assessment Clean, dry, & intact   Phlebitis Assessment 0   Infiltration Assessment 0   Dressing Status Clean, dry, & intact   Dressing Type Transparent;Tape   Hub Color/Line Status Blue;Capped;Flushed;Patent  (SALINE LOCKED BRISK BLOOD RETURN)   Alcohol Cap Used Yes

## 2023-01-01 NOTE — PROGRESS NOTES
Pulmonary and Critical Care progress note    Subjective:   Consult Note: 1/1/2023 @no control      Chief Complaint:   Chief Complaint   Patient presents with    Fall        This patient has been seen and evaluated at the request of Dr. Micah Boyd and Dr. Froylan Brock    Patient seen and examined  Overnight events noted    Sitting up in the recliner comfortably  On room air  Awake and alert  No acute distress  S/p right hip surgery 12/30/2022     Review of Systems:  A comprehensive review of systems was negative except for that written in the HPI.        Current Facility-Administered Medications   Medication Dose Route Frequency Provider Last Rate Last Admin    sodium chloride (NS) flush 5-40 mL  5-40 mL IntraVENous Q8H Estrellita Regan MD   10 mL at 01/01/23 0510    sodium chloride (NS) flush 5-40 mL  5-40 mL IntraVENous PRN Estrellita Regan MD        traMADoL Lyndia Luther) tablet 50 mg  50 mg Oral Q6H PRN Estrellita Regan MD        senna-docusate (PERICOLACE) 8.6-50 mg per tablet 1 Tablet  1 Tablet Oral BID Estrellita Regan MD   1 Tablet at 01/01/23 1031    bisacodyL (DULCOLAX) suppository 10 mg  10 mg Rectal DAILY PRN Estrellita Regan MD        aspirin delayed-release tablet 325 mg  325 mg Oral BID Estrellita Regan MD   325 mg at 01/01/23 1031    celecoxib (CELEBREX) capsule 200 mg  200 mg Oral DAILY Estrellita Regan MD   200 mg at 01/01/23 1032    meropenem (MERREM) 1 g in 0.9% sodium chloride (MBP/ADV) 50 mL MBP  1 g IntraVENous Q8H Estrellita Regan  mL/hr at 01/01/23 0508 1 g at 01/01/23 0508    albuterol CONCENTRATE 2.5mg/0.5 mL neb soln  2.5 mg Nebulization Q4H PRN Estrellita Regan MD   2.5 mg at 12/31/22 0110    allopurinoL (ZYLOPRIM) tablet 300 mg  300 mg Oral DAILY Estrellita Regan MD   300 mg at 01/01/23 1033    cetirizine (ZYRTEC) tablet 10 mg  10 mg Oral QHS Estrellita Regan MD   10 mg at 12/31/22 2217    cyanocobalamin (VITAMIN B12) tablet 2,000 mcg  2,000 mcg Oral DAILY Lucas Lee MD   2,000 mcg at 01/01/23 1033    . PHARMACY TO SUBSTITUTE PER PROTOCOL (Reordered from: d-mannose (AZO D-Mannose) 500 mg cap)    Per Protocol Lucas Lee MD        ergocalciferol capsule 50,000 Units  50,000 Units Oral Q7D Lucas Lee MD        furosemide (LASIX) tablet 20 mg  20 mg Oral DAILY Lucas Lee MD   20 mg at 01/01/23 1031    lisinopriL (PRINIVIL, ZESTRIL) tablet 2.5 mg  2.5 mg Oral DAILY Lucas Lee MD   2.5 mg at 01/01/23 1035    memantine (NAMENDA) tablet 5 mg  5 mg Oral DAILY Lucas Lee MD   5 mg at 01/01/23 1032    metoprolol succinate (TOPROL-XL) XL tablet 50 mg  50 mg Oral DAILY Lucas Lee MD   50 mg at 01/01/23 1033    montelukast (SINGULAIR) tablet 10 mg  10 mg Oral DAILY Lucas Lee MD   10 mg at 01/01/23 1032    neomycin-bacitracin-polymyxin (NEOSPORIN) ointment   Topical QID Lucas Lee MD   Given at 12/31/22 2229    acidophilus-pectin, citrus tablet 1 Tablet  1 Tablet Oral BID Lucas Lee MD   1 Tablet at 01/01/23 1031    sertraline (ZOLOFT) tablet 50 mg  50 mg Oral DAILY Lucas Lee MD   50 mg at 01/01/23 1032    budesonide-formoteroL (SYMBICORT) 160-4.5 mcg/actuation HFA inhaler 2 Puff  2 Puff Inhalation BID Lucas Lee MD   2 Puff at 01/01/23 6217    thyroid (Pork) (ARMOUR) tablet 60 mg  60 mg Oral DAILY Lucas Lee MD   60 mg at 01/01/23 0900    sodium chloride (NS) flush 5-40 mL  5-40 mL IntraVENous Q8H Lucas Lee MD   10 mL at 12/31/22 1425    sodium chloride (NS) flush 5-40 mL  5-40 mL IntraVENous PRN Lucas Lee MD        polyethylene glycol (MIRALAX) packet 17 g  17 g Oral DAILY PRN Lucas Lee MD        ondansetron (ZOFRAN ODT) tablet 4 mg  4 mg Oral Q8H PRN Lucas Lee MD        Or    ondansetron (ZOFRAN) injection 4 mg  4 mg IntraVENous Q6H PRN Lucas Lee MD        acetaminophen (TYLENOL) tablet 1,000 mg  1,000 mg Oral Q6H Aneta Turner MD   1,000 mg at 23 1031          Allergies   Allergen Reactions    Cipro [Ciprofloxacin Hcl] Other (comments)    Ketek [Telithromycin] Other (comments)    Mobic [Meloxicam] Other (comments)    Penicillin Other (comments)    Shrimp Extract Other (comments)           Objective:     Blood pressure 122/67, pulse 88, temperature 97.7 °F (36.5 °C), resp. rate 18, weight 72.9 kg (160 lb 11.5 oz), SpO2 96 %. Temp (24hrs), Av.1 °F (36.7 °C), Min:97.7 °F (36.5 °C), Max:98.4 °F (36.9 °C)      Intake and Output:  Current Shift: 701 -  190  In: 240 [P.O.:240]  Out: -   Last 3 Shifts: 1901 -  0700  In: 1800 [P.O.:800; I.V.:1000]  Out: 650 [Urine:600]    Intake/Output Summary (Last 24 hours) at 2023 1147  Last data filed at 2023 1008  Gross per 24 hour   Intake 1240 ml   Output 250 ml   Net 990 ml          Physical Exam:     General: Lying in bed comfortably, no acute distress  Eye: Reactive, symmetric  Throat and Neck: Supple  Lung: Reduced air entry bilaterally with prolonged exhalation but no wheezing. Occasional crackles. Heart: S1+S2. No murmurs  Abdomen: soft, non-tender. Bowel sounds normal. No masses; obese  Extremities: No edema  : Not done  Skin: No cyanosis  Neurologic: A & O x3.   Grossly nonfocal  Psychiatric: Appropriate affect; coherent      Lab/Data Review:    Recent Results (from the past 24 hour(s))   HEMOGLOBIN    Collection Time: 23  7:40 AM   Result Value Ref Range    HGB 7.7 (L) 11.5 - 37.0 g/dL   METABOLIC PANEL, COMPREHENSIVE    Collection Time: 23  7:40 AM   Result Value Ref Range    Sodium 134 (L) 136 - 145 mmol/L    Potassium 3.8 3.5 - 5.1 mmol/L    Chloride 102 97 - 108 mmol/L    CO2 26 21 - 32 mmol/L    Anion gap 6 5 - 15 mmol/L    Glucose 109 (H) 65 - 100 mg/dL    BUN 36 (H) 6 - 20 mg/dL    Creatinine 1.14 (H) 0.55 - 1.02 mg/dL    BUN/Creatinine ratio 32 (H) 12 - 20      eGFR 46 (L) >60 ml/min/1.73m2    Calcium 8.0 (L) 8.5 - 10.1 mg/dL    Bilirubin, total 0.5 0.2 - 1.0 mg/dL    AST (SGOT) 31 15 - 37 U/L    ALT (SGPT) 13 12 - 78 U/L    Alk. phosphatase 51 45 - 117 U/L    Protein, total 4.8 (L) 6.4 - 8.2 g/dL    Albumin 2.1 (L) 3.5 - 5.0 g/dL    Globulin 2.7 2.0 - 4.0 g/dL    A-G Ratio 0.8 (L) 1.1 - 2.2     CBC WITH AUTOMATED DIFF    Collection Time: 01/01/23  7:40 AM   Result Value Ref Range    WBC 10.4 3.6 - 11.0 K/uL    RBC 2.46 (L) 3.80 - 5.20 M/uL    HGB 7.7 (L) 11.5 - 16.0 g/dL    HCT 23.0 (L) 35.0 - 47.0 %    MCV 93.5 80.0 - 99.0 FL    MCH 31.3 26.0 - 34.0 PG    MCHC 33.5 30.0 - 36.5 g/dL    RDW 15.4 (H) 11.5 - 14.5 %    PLATELET 593 (L) 265 - 400 K/uL    MPV 10.8 8.9 - 12.9 FL    NRBC 0.0 0.0  WBC    ABSOLUTE NRBC 0.00 0.00 - 0.01 K/uL    NEUTROPHILS 79 (H) 32 - 75 %    LYMPHOCYTES 13 12 - 49 %    MONOCYTES 6 5 - 13 %    EOSINOPHILS 2 0 - 7 %    BASOPHILS 0 0 - 1 %    IMMATURE GRANULOCYTES 0 0 - 0.5 %    ABS. NEUTROPHILS 8.2 (H) 1.8 - 8.0 K/UL    ABS. LYMPHOCYTES 1.3 0.8 - 3.5 K/UL    ABS. MONOCYTES 0.6 0.0 - 1.0 K/UL    ABS. EOSINOPHILS 0.2 0.0 - 0.4 K/UL    ABS. BASOPHILS 0.0 0.0 - 0.1 K/UL    ABS. IMM. GRANS. 0.0 0.00 - 0.04 K/UL    DF AUTOMATED         XR FLUOROSCOPY UNDER 60 MINUTES   Final Result   Right hip ORIF in progress. CT Results  (Last 48 hours)      None              Assessment:     1. Bilateral pneumonia  2. Asthma  3. Right hip fracture  4. Preoperative clearance  5. UTI  6. Hypertension  7.   History of dementia    Plan:     Patient admitted to the hospital  Will be watched here closely    Currently on room air  Use supplemental oxygen if needed to keep saturation above 92%    Chest x-ray shows bilateral patchy infiltrates suspicious for pneumonia or volume excess  Continue antibiotics  Culture sent  Further changes in antibiotics based on clinical response and culture results    Patient has a acute right hip fracture  Appreciate Ortho input  S/p surgery 12/30  Antibiotics for UTI    Continue with her medications  Continue medications for gout    DVT and GI prophylaxis    Questions of patient were answered at bedside in detail  Case discussed in detail with RN, RT, and care team  Thank you for involving me in the care of this patient  I will follow with you closely during hospitalization    Time spent more than 30 minutes in direct patient care with no overlap    Mario Pop MD  [facility]  1/1/2023  11:25 AM

## 2023-01-01 NOTE — PROGRESS NOTES
Hospitalist Progress Note            Daily Progress Note: 1/1/2023 8:58 AM  Hospital course:     She is an 51-year-old female with a history of atrial fibrillation, CHF, dementia, hypertension, thyroid disease and asthma who was brought to the ED on 12/29 after a ground-level fall. She developed immediate right sided hip pain. In the ED, x-ray demonstrated an acute comminuted displaced intertrochanteric right femur fracture with joint effusion. Patient has been seen by orthopedics 12/30 ORIF performed. She was been seen by pulmonology preoperatively. Her chest x-ray shows bilateral multifocal pneumonia. She was started on IV meropenem due to multiple allergies to antibiotics. PT and OT are recommending SNF placement for rehab. The son and POA would prefer she be placed somewhere in [place] closer to where they live. Subjective:     Examined patient at the bedside. She is pleasantly confused. She has a cough that is congested. She remains weak and frail. Discussed plan of care at length with son at the bedside. He shares he would prefer her to be placed in skilled nursing facility near to where they live in [place].     Assessment/Plan:   Active Problems:    Hip fracture ([address] (12/29/2022)      UTI (urinary tract infection) (12/29/2022)    Acute right hip fracture  Taken to the operating room and repair with open reduction internal fixation by Dr. Raquel Monet  PT OT consult  Most likely require rehab and discussed with family at the bedside  Increased as of the right leg may require CT if worsening  Repeat CBC    Multifocal community-acquired pneumonia  Continue meropenem due to allergies  Repeat chest x-ray    Dementia  Continue [place]     Essential hypertension  Continue lisinopril  Continue metoprolol    Paroxysmal atrial fibrillation  Continue metoprolol     Hypothyroidism  Continue [place]        DVT Prophylaxis:  Code Status: DNR  POA/NOK:    Disposition and discharge barriers: Pt/ot rec SNF  Care Plan discussed with:     Current Facility-Administered Medications   Medication Dose Route Frequency    sodium chloride (NS) flush 5-40 mL  5-40 mL IntraVENous Q8H    sodium chloride (NS) flush 5-40 mL  5-40 mL IntraVENous PRN    traMADoL (ULTRAM) tablet 50 mg  50 mg Oral Q6H PRN    senna-docusate (PERICOLACE) 8.6-50 mg per tablet 1 Tablet  1 Tablet Oral BID    bisacodyL (DULCOLAX) suppository 10 mg  10 mg Rectal DAILY PRN    aspirin delayed-release tablet 325 mg  325 mg Oral BID    celecoxib (CELEBREX) capsule 200 mg  200 mg Oral DAILY    meropenem (MERREM) 1 g in 0.9% sodium chloride (MBP/ADV) 50 mL MBP  1 g IntraVENous Q8H    albuterol CONCENTRATE 2.5mg/0.5 mL neb soln  2.5 mg Nebulization Q4H PRN    allopurinoL (ZYLOPRIM) tablet 300 mg  300 mg Oral DAILY    cetirizine (ZYRTEC) tablet 10 mg  10 mg Oral QHS    cyanocobalamin (VITAMIN B12) tablet 2,000 mcg  2,000 mcg Oral DAILY    . PHARMACY TO SUBSTITUTE PER PROTOCOL (Reordered from: d-mannose (AZO D-Mannose) 500 mg cap)    Per Protocol    ergocalciferol capsule 50,000 Units  50,000 Units Oral Q7D    furosemide (LASIX) tablet 20 mg  20 mg Oral DAILY    lisinopriL (PRINIVIL, ZESTRIL) tablet 2.5 mg  2.5 mg Oral DAILY    memantine (NAMENDA) tablet 5 mg  5 mg Oral DAILY    metoprolol succinate (TOPROL-XL) XL tablet 50 mg  50 mg Oral DAILY    montelukast (SINGULAIR) tablet 10 mg  10 mg Oral DAILY    neomycin-bacitracin-polymyxin (NEOSPORIN) ointment   Topical QID    acidophilus-pectin, citrus tablet 1 Tablet  1 Tablet Oral BID    sertraline (ZOLOFT) tablet 50 mg  50 mg Oral DAILY    budesonide-formoteroL (SYMBICORT) 160-4.5 mcg/actuation HFA inhaler 2 Puff  2 Puff Inhalation BID    thyroid (Pork) (ARMOUR) tablet 60 mg  60 mg Oral DAILY    sodium chloride (NS) flush 5-40 mL  5-40 mL IntraVENous Q8H    sodium chloride (NS) flush 5-40 mL  5-40 mL IntraVENous PRN    polyethylene glycol (MIRALAX) packet 17 g  17 g Oral DAILY PRN    ondansetron (ZOFRAN ODT) tablet 4 mg  4 mg Oral Q8H PRN    Or    ondansetron (ZOFRAN) injection 4 mg  4 mg IntraVENous Q6H PRN    acetaminophen (TYLENOL) tablet 1,000 mg  1,000 mg Oral Q6H        REVIEW OF SYSTEMS    Review of Systems   Constitutional:  Positive for malaise/fatigue. HENT:  Positive for congestion. Respiratory:  Positive for cough, sputum production and wheezing. Cardiovascular:  Negative for leg swelling. Musculoskeletal:  Positive for myalgias. Neurological:  Positive for weakness. Psychiatric/Behavioral:  Positive for memory loss. The patient is nervous/anxious. Objective:     Visit Vitals  /67 (BP 1 Location: Left upper arm, BP Patient Position: At rest)   Pulse 88   Temp 97.7 °F (36.5 °C)   Resp 18   Wt 72.9 kg (160 lb 11.5 oz)   SpO2 96%   BMI 22.73 kg/m²    O2 Flow Rate (L/min): 3 l/min O2 Device: None (Room air)    Temp (24hrs), Av.1 °F (36.7 °C), Min:97.7 °F (36.5 °C), Max:98.4 °F (36.9 °C)        PHYSICAL EXAM:    Physical Exam  Constitutional:       Appearance: She is ill-appearing. HENT:      Nose: Congestion present. Cardiovascular:      Rate and Rhythm: Normal rate and regular rhythm. Pulmonary:      Effort: No respiratory distress. Breath sounds: Rales present. Abdominal:      General: There is no distension. Tenderness: There is no abdominal tenderness. Musculoskeletal:      Right lower leg: Edema present. Left lower leg: Edema present.         Data Review    Recent Results (from the past 24 hour(s))   METABOLIC PANEL, BASIC    Collection Time: 22  9:28 AM   Result Value Ref Range    Sodium 135 (L) 136 - 145 mmol/L    Potassium 4.7 3.5 - 5.1 mmol/L    Chloride 103 97 - 108 mmol/L    CO2 26 21 - 32 mmol/L    Anion gap 6 5 - 15 mmol/L    Glucose 122 (H) 65 - 100 mg/dL    BUN 27 (H) 6 - 20 mg/dL    Creatinine 1.02 0.55 - 1.02 mg/dL    BUN/Creatinine ratio 26 (H) 12 - 20      eGFR 53 (L) >60 ml/min/1.73m2    Calcium 8.4 (L) 8.5 - 10.1 mg/dL HEMOGLOBIN    Collection Time: 12/31/22  9:28 AM   Result Value Ref Range    HGB 9.9 (L) 11.5 - 16.0 g/dL       XR FLUOROSCOPY UNDER 60 MINUTES   Final Result   Right hip ORIF in progress. Intake and Output:  Current Shift: No intake/output data recorded. Last three shifts: 12/30 1901 - 01/01 0700  In: 1800 [P.O.:800; I.V.:1000]  Out: 650 [Urine:600]      Lab/Data Review:  Recent Labs     12/31/22 0928 12/30/22 0243   WBC  --  12.5*   HGB 9.9* 10.0*   HCT  --  30.3*   PLT  --  140*     Recent Labs     12/31/22 0928 12/30/22 0243   * 137   K 4.7 4.6    102   CO2 26 28   * 126*   BUN 27* 21*   CREA 1.02 0.73   CA 8.4* 8.3*   MG  --  2.1     No results for input(s): PH, PCO2, PO2, HCO3, FIO2 in the last 72 hours. Recent Results (from the past 24 hour(s))   METABOLIC PANEL, BASIC    Collection Time: 12/31/22  9:28 AM   Result Value Ref Range    Sodium 135 (L) 136 - 145 mmol/L    Potassium 4.7 3.5 - 5.1 mmol/L    Chloride 103 97 - 108 mmol/L    CO2 26 21 - 32 mmol/L    Anion gap 6 5 - 15 mmol/L    Glucose 122 (H) 65 - 100 mg/dL    BUN 27 (H) 6 - 20 mg/dL    Creatinine 1.02 0.55 - 1.02 mg/dL    BUN/Creatinine ratio 26 (H) 12 - 20      eGFR 53 (L) >60 ml/min/1.73m2    Calcium 8.4 (L) 8.5 - 10.1 mg/dL   HEMOGLOBIN    Collection Time: 12/31/22  9:28 AM   Result Value Ref Range    HGB 9.9 (L) 11.5 - 16.0 g/dL           _____________________________________________________________________________  Time spent in direct care including coordination of service, review of data and examination: > 35 minutes    ______________________________________________________________________________    Prem Blue NP    This is dictation was done by dragon, computer voice recognition software. Quite often unanticipated grammatical, syntax, homophones and other interpretive errors or inadvertently transcribed by the computer software. Please excuse errors that have escaped final proofreading.   Thank you.

## 2023-01-02 LAB
ALBUMIN SERPL-MCNC: 2.2 G/DL (ref 3.5–5)
ALBUMIN/GLOB SERPL: 0.8 {RATIO} (ref 1.1–2.2)
ALP SERPL-CCNC: 57 U/L (ref 45–117)
ALT SERPL-CCNC: 13 U/L (ref 12–78)
ANION GAP SERPL CALC-SCNC: 5 MMOL/L (ref 5–15)
AST SERPL W P-5'-P-CCNC: 41 U/L (ref 15–37)
BASOPHILS # BLD: 0 K/UL (ref 0–0.1)
BASOPHILS NFR BLD: 0 % (ref 0–1)
BILIRUB SERPL-MCNC: 0.7 MG/DL (ref 0.2–1)
BUN SERPL-MCNC: 28 MG/DL (ref 6–20)
BUN/CREAT SERPL: 37 (ref 12–20)
CA-I BLD-MCNC: 8.1 MG/DL (ref 8.5–10.1)
CHLORIDE SERPL-SCNC: 99 MMOL/L (ref 97–108)
CO2 SERPL-SCNC: 27 MMOL/L (ref 21–32)
CREAT SERPL-MCNC: 0.76 MG/DL (ref 0.55–1.02)
DIFFERENTIAL METHOD BLD: ABNORMAL
EOSINOPHIL # BLD: 0.1 K/UL (ref 0–0.4)
EOSINOPHIL NFR BLD: 2 % (ref 0–7)
ERYTHROCYTE [DISTWIDTH] IN BLOOD BY AUTOMATED COUNT: 14.6 % (ref 11.5–14.5)
GLOBULIN SER CALC-MCNC: 2.6 G/DL (ref 2–4)
GLUCOSE SERPL-MCNC: 113 MG/DL (ref 65–100)
HCT VFR BLD AUTO: 22.6 % (ref 35–47)
HGB BLD-MCNC: 7.4 G/DL (ref 11.5–16)
IMM GRANULOCYTES # BLD AUTO: 0.1 K/UL (ref 0–0.04)
IMM GRANULOCYTES NFR BLD AUTO: 1 % (ref 0–0.5)
LYMPHOCYTES # BLD: 1 K/UL (ref 0.8–3.5)
LYMPHOCYTES NFR BLD: 10 % (ref 12–49)
MCH RBC QN AUTO: 29.1 PG (ref 26–34)
MCHC RBC AUTO-ENTMCNC: 32.7 G/DL (ref 30–36.5)
MCV RBC AUTO: 89 FL (ref 80–99)
MONOCYTES # BLD: 0.5 K/UL (ref 0–1)
MONOCYTES NFR BLD: 6 % (ref 5–13)
NEUTS SEG # BLD: 7.9 K/UL (ref 1.8–8)
NEUTS SEG NFR BLD: 81 % (ref 32–75)
NRBC # BLD: 0 K/UL (ref 0–0.01)
NRBC BLD-RTO: 0 PER 100 WBC
PLATELET # BLD AUTO: 132 K/UL (ref 150–400)
PMV BLD AUTO: 10.6 FL (ref 8.9–12.9)
POTASSIUM SERPL-SCNC: 4.1 MMOL/L (ref 3.5–5.1)
PROT SERPL-MCNC: 4.8 G/DL (ref 6.4–8.2)
RBC # BLD AUTO: 2.54 M/UL (ref 3.8–5.2)
SODIUM SERPL-SCNC: 131 MMOL/L (ref 136–145)
WBC # BLD AUTO: 9.6 K/UL (ref 3.6–11)

## 2023-01-02 PROCEDURE — 74011000250 HC RX REV CODE- 250: Performed by: ORTHOPAEDIC SURGERY

## 2023-01-02 PROCEDURE — 36415 COLL VENOUS BLD VENIPUNCTURE: CPT

## 2023-01-02 PROCEDURE — 80053 COMPREHEN METABOLIC PANEL: CPT

## 2023-01-02 PROCEDURE — 74011250637 HC RX REV CODE- 250/637: Performed by: ORTHOPAEDIC SURGERY

## 2023-01-02 PROCEDURE — 65270000029 HC RM PRIVATE

## 2023-01-02 PROCEDURE — 94761 N-INVAS EAR/PLS OXIMETRY MLT: CPT

## 2023-01-02 PROCEDURE — 85025 COMPLETE CBC W/AUTO DIFF WBC: CPT

## 2023-01-02 PROCEDURE — 97530 THERAPEUTIC ACTIVITIES: CPT

## 2023-01-02 PROCEDURE — 74011250636 HC RX REV CODE- 250/636: Performed by: ORTHOPAEDIC SURGERY

## 2023-01-02 PROCEDURE — 74011000258 HC RX REV CODE- 258: Performed by: ORTHOPAEDIC SURGERY

## 2023-01-02 PROCEDURE — 97110 THERAPEUTIC EXERCISES: CPT

## 2023-01-02 PROCEDURE — 94640 AIRWAY INHALATION TREATMENT: CPT

## 2023-01-02 RX ADMIN — MEMANTINE HYDROCHLORIDE 5 MG: 10 TABLET ORAL at 10:38

## 2023-01-02 RX ADMIN — SODIUM CHLORIDE, PRESERVATIVE FREE 10 ML: 5 INJECTION INTRAVENOUS at 05:12

## 2023-01-02 RX ADMIN — Medication 1 TABLET: at 10:36

## 2023-01-02 RX ADMIN — ASPIRIN 325 MG: 325 TABLET, COATED ORAL at 10:47

## 2023-01-02 RX ADMIN — MEROPENEM 1 G: 1 INJECTION, POWDER, FOR SOLUTION INTRAVENOUS at 05:12

## 2023-01-02 RX ADMIN — SODIUM CHLORIDE, PRESERVATIVE FREE 10 ML: 5 INJECTION INTRAVENOUS at 22:55

## 2023-01-02 RX ADMIN — FUROSEMIDE 20 MG: 40 TABLET ORAL at 10:37

## 2023-01-02 RX ADMIN — BUDESONIDE AND FORMOTEROL FUMARATE DIHYDRATE 2 PUFF: 160; 4.5 AEROSOL RESPIRATORY (INHALATION) at 20:07

## 2023-01-02 RX ADMIN — CYANOCOBALAMIN TAB 500 MCG 2000 MCG: 500 TAB at 10:36

## 2023-01-02 RX ADMIN — SODIUM CHLORIDE, PRESERVATIVE FREE 10 ML: 5 INJECTION INTRAVENOUS at 15:14

## 2023-01-02 RX ADMIN — ACETAMINOPHEN 1000 MG: 500 TABLET ORAL at 10:36

## 2023-01-02 RX ADMIN — BUDESONIDE AND FORMOTEROL FUMARATE DIHYDRATE 2 PUFF: 160; 4.5 AEROSOL RESPIRATORY (INHALATION) at 09:04

## 2023-01-02 RX ADMIN — LEVOTHYROXINE, LIOTHYRONINE 60 MG: 9.5; 2.25 TABLET ORAL at 09:00

## 2023-01-02 RX ADMIN — CELECOXIB 200 MG: 200 CAPSULE ORAL at 10:36

## 2023-01-02 RX ADMIN — SERTRALINE HYDROCHLORIDE 50 MG: 50 TABLET ORAL at 10:36

## 2023-01-02 RX ADMIN — LISINOPRIL 2.5 MG: 5 TABLET ORAL at 10:37

## 2023-01-02 RX ADMIN — ACETAMINOPHEN 1000 MG: 500 TABLET ORAL at 05:12

## 2023-01-02 RX ADMIN — ASPIRIN 325 MG: 325 TABLET, COATED ORAL at 22:55

## 2023-01-02 RX ADMIN — MEROPENEM 1 G: 1 INJECTION, POWDER, FOR SOLUTION INTRAVENOUS at 12:54

## 2023-01-02 RX ADMIN — BACITRACIN ZINC, NEOMYCIN, POLYMYXIN B: 400; 3.5; 5 OINTMENT TOPICAL at 10:40

## 2023-01-02 RX ADMIN — MEROPENEM 1 G: 1 INJECTION, POWDER, FOR SOLUTION INTRAVENOUS at 22:55

## 2023-01-02 RX ADMIN — SODIUM CHLORIDE, PRESERVATIVE FREE 10 ML: 5 INJECTION INTRAVENOUS at 22:56

## 2023-01-02 RX ADMIN — ALLOPURINOL 300 MG: 100 TABLET ORAL at 10:39

## 2023-01-02 RX ADMIN — ACETAMINOPHEN 1000 MG: 500 TABLET ORAL at 22:55

## 2023-01-02 RX ADMIN — Medication 1 TABLET: at 23:14

## 2023-01-02 RX ADMIN — MONTELUKAST 10 MG: 10 TABLET, FILM COATED ORAL at 10:36

## 2023-01-02 RX ADMIN — METOPROLOL SUCCINATE 50 MG: 50 TABLET, EXTENDED RELEASE ORAL at 10:39

## 2023-01-02 RX ADMIN — ACETAMINOPHEN 1000 MG: 500 TABLET ORAL at 15:17

## 2023-01-02 NOTE — PROGRESS NOTES
Bedside shift change report given to [address] [place] [address] and Arlin Domingo LPN (oncoming nurse) by Krista Shrestha RN (offgoing nurse). Report included the following information SBAR, Intake/Output, and MAR.

## 2023-01-02 NOTE — PROGRESS NOTES
Hospitalist Progress Note            Daily Progress Note: 1/2/2023 8:58 AM  Hospital course:     She is an 70-year-old female with a history of atrial fibrillation, CHF, dementia, hypertension, thyroid disease and asthma who was brought to the ED on 12/29 after a ground-level fall. She developed immediate right sided hip pain. In the ED, x-ray demonstrated an acute comminuted displaced intertrochanteric right femur fracture with joint effusion. Patient has been seen by orthopedics 12/30 ORIF performed. She was been seen by pulmonology preoperatively. Her chest x-ray shows bilateral multifocal pneumonia. She was started on IV meropenem due to multiple allergies to antibiotics. PT and OT are recommending SNF placement for rehab. The son and POA would prefer she be placed somewhere in [facility] closer to where they live. Subjective: Follow-up examination of patient at the bedside. She is pleasantly confused. She has a cough that is congested. She remains weak and frail. Did not have a good night last night due to treatment for constipation led to multiple loose stools and insomnia which worsened her baseline confusion. Discussed plan of care at length with daughter at the bedside. She  would prefer her to be placed in skilled nursing facility near to where they live in [place].     Assessment/Plan:   Active Problems:    Hip fracture ([address] (12/29/2022)      UTI (urinary tract infection) (12/29/2022)    Acute right hip fracture  Taken to the operating room and repair with open reduction internal fixation by Dr. Josr Arciniega  PT OT consult  Most likely require rehab and discussed with family at the bedside  Increased as of the right leg may require CT if worsening  Repeat CBC    Multifocal community-acquired pneumonia  Continue meropenem due to allergies  Repeat chest x-ray    Dementia  Continue [place]     Essential hypertension  Continue lisinopril  Continue metoprolol    Paroxysmal atrial fibrillation  Continue metoprolol     Hypothyroidism  Continue [place]        DVT Prophylaxis:  Code Status: DNR  POA/NOK:    Disposition and discharge barriers:   Pt/ot rec SNF  Care Plan discussed with:     Current Facility-Administered Medications   Medication Dose Route Frequency    sodium chloride (NS) flush 5-40 mL  5-40 mL IntraVENous Q8H    sodium chloride (NS) flush 5-40 mL  5-40 mL IntraVENous PRN    traMADoL (ULTRAM) tablet 50 mg  50 mg Oral Q6H PRN    senna-docusate (PERICOLACE) 8.6-50 mg per tablet 1 Tablet  1 Tablet Oral BID    bisacodyL (DULCOLAX) suppository 10 mg  10 mg Rectal DAILY PRN    aspirin delayed-release tablet 325 mg  325 mg Oral BID    celecoxib (CELEBREX) capsule 200 mg  200 mg Oral DAILY    meropenem (MERREM) 1 g in 0.9% sodium chloride (MBP/ADV) 50 mL MBP  1 g IntraVENous Q8H    albuterol CONCENTRATE 2.5mg/0.5 mL neb soln  2.5 mg Nebulization Q4H PRN    allopurinoL (ZYLOPRIM) tablet 300 mg  300 mg Oral DAILY    cetirizine (ZYRTEC) tablet 10 mg  10 mg Oral QHS    cyanocobalamin (VITAMIN B12) tablet 2,000 mcg  2,000 mcg Oral DAILY    . PHARMACY TO SUBSTITUTE PER PROTOCOL (Reordered from: d-mannose (AZO D-Mannose) 500 mg cap)    Per Protocol    ergocalciferol capsule 50,000 Units  50,000 Units Oral Q7D    furosemide (LASIX) tablet 20 mg  20 mg Oral DAILY    lisinopriL (PRINIVIL, ZESTRIL) tablet 2.5 mg  2.5 mg Oral DAILY    memantine (NAMENDA) tablet 5 mg  5 mg Oral DAILY    metoprolol succinate (TOPROL-XL) XL tablet 50 mg  50 mg Oral DAILY    montelukast (SINGULAIR) tablet 10 mg  10 mg Oral DAILY    neomycin-bacitracin-polymyxin (NEOSPORIN) ointment   Topical QID    acidophilus-pectin, citrus tablet 1 Tablet  1 Tablet Oral BID    sertraline (ZOLOFT) tablet 50 mg  50 mg Oral DAILY    budesonide-formoteroL (SYMBICORT) 160-4.5 mcg/actuation HFA inhaler 2 Puff  2 Puff Inhalation BID    thyroid (Pork) (ARMOUR) tablet 60 mg  60 mg Oral DAILY    sodium chloride (NS) flush 5-40 mL  5-40 mL IntraVENous Q8H    sodium chloride (NS) flush 5-40 mL  5-40 mL IntraVENous PRN    polyethylene glycol (MIRALAX) packet 17 g  17 g Oral DAILY PRN    ondansetron (ZOFRAN ODT) tablet 4 mg  4 mg Oral Q8H PRN    Or    ondansetron (ZOFRAN) injection 4 mg  4 mg IntraVENous Q6H PRN    acetaminophen (TYLENOL) tablet 1,000 mg  1,000 mg Oral Q6H        REVIEW OF SYSTEMS    Review of Systems   Constitutional:  Positive for malaise/fatigue. HENT:  Positive for congestion. Respiratory:  Positive for cough, sputum production and wheezing. Cardiovascular:  Negative for leg swelling. Musculoskeletal:  Positive for myalgias. Neurological:  Positive for weakness. Psychiatric/Behavioral:  Positive for memory loss. The patient is nervous/anxious. Objective:     Visit Vitals  BP (!) 145/82 (BP 1 Location: Left upper arm, BP Patient Position: At rest) Comment: Will Notify Primary Nurse   Pulse 89   Temp 97.5 °F (36.4 °C)   Resp 18   Wt 71.7 kg (158 lb 1.1 oz)   SpO2 98%   BMI 22.36 kg/m²    O2 Flow Rate (L/min): 3 l/min O2 Device: None (Room air)    Temp (24hrs), Av.7 °F (36.5 °C), Min:96.8 °F (36 °C), Max:98.1 °F (36.7 °C)        PHYSICAL EXAM:    Physical Exam  Constitutional:       Appearance: She is ill-appearing. HENT:      Nose: Congestion present. Cardiovascular:      Rate and Rhythm: Normal rate and regular rhythm. Pulmonary:      Effort: No respiratory distress. Breath sounds: Rales present. Abdominal:      General: There is no distension. Tenderness: There is no abdominal tenderness. Musculoskeletal:      Right lower leg: Edema present. Left lower leg: Edema present.         Data Review    Recent Results (from the past 24 hour(s))   GLUCOSE, POC    Collection Time: 23  4:55 PM   Result Value Ref Range    Glucose (POC) 143 (H) 65 - 100 mg/dL    Performed by Pisek Bud    CBC WITH AUTOMATED DIFF    Collection Time: 23  6:57 AM   Result Value Ref Range    WBC 9.6 3.6 - 11.0 K/uL    RBC 2.54 (L) 3.80 - 5.20 M/uL    HGB 7.4 (L) 11.5 - 16.0 g/dL    HCT 22.6 (L) 35.0 - 47.0 %    MCV 89.0 80.0 - 99.0 FL    MCH 29.1 26.0 - 34.0 PG    MCHC 32.7 30.0 - 36.5 g/dL    RDW 14.6 (H) 11.5 - 14.5 %    PLATELET 862 (L) 402 - 400 K/uL    MPV 10.6 8.9 - 12.9 FL    NRBC 0.0 0.0  WBC    ABSOLUTE NRBC 0.00 0.00 - 0.01 K/uL    NEUTROPHILS 81 (H) 32 - 75 %    LYMPHOCYTES 10 (L) 12 - 49 %    MONOCYTES 6 5 - 13 %    EOSINOPHILS 2 0 - 7 %    BASOPHILS 0 0 - 1 %    IMMATURE GRANULOCYTES 1 (H) 0 - 0.5 %    ABS. NEUTROPHILS 7.9 1.8 - 8.0 K/UL    ABS. LYMPHOCYTES 1.0 0.8 - 3.5 K/UL    ABS. MONOCYTES 0.5 0.0 - 1.0 K/UL    ABS. EOSINOPHILS 0.1 0.0 - 0.4 K/UL    ABS. BASOPHILS 0.0 0.0 - 0.1 K/UL    ABS. IMM. GRANS. 0.1 (H) 0.00 - 0.04 K/UL    DF AUTOMATED     METABOLIC PANEL, COMPREHENSIVE    Collection Time: 01/02/23  6:57 AM   Result Value Ref Range    Sodium 131 (L) 136 - 145 mmol/L    Potassium 4.1 3.5 - 5.1 mmol/L    Chloride 99 97 - 108 mmol/L    CO2 27 21 - 32 mmol/L    Anion gap 5 5 - 15 mmol/L    Glucose 113 (H) 65 - 100 mg/dL    BUN 28 (H) 6 - 20 mg/dL    Creatinine 0.76 0.55 - 1.02 mg/dL    BUN/Creatinine ratio 37 (H) 12 - 20      eGFR >60 >60 ml/min/1.73m2    Calcium 8.1 (L) 8.5 - 10.1 mg/dL    Bilirubin, total 0.7 0.2 - 1.0 mg/dL    AST (SGOT) 41 (H) 15 - 37 U/L    ALT (SGPT) 13 12 - 78 U/L    Alk. phosphatase 57 45 - 117 U/L    Protein, total 4.8 (L) 6.4 - 8.2 g/dL    Albumin 2.2 (L) 3.5 - 5.0 g/dL    Globulin 2.6 2.0 - 4.0 g/dL    A-G Ratio 0.8 (L) 1.1 - 2.2         XR FLUOROSCOPY UNDER 60 MINUTES   Final Result   Right hip ORIF in progress. Intake and Output:  Current Shift: No intake/output data recorded. Last three shifts: 12/31 1901 - 01/02 0700  In: 2190 [P.O.:1840;  I.V.:350]  Out: 900 [Urine:900]      Lab/Data Review:  Recent Labs     01/02/23  0657 01/01/23  0740 12/31/22  0928   WBC 9.6 10.4  --    HGB 7.4* 7.7*  7.7* 9.9*   HCT 22.6* 23.0*  --    * 107*  -- Recent Labs     01/02/23  0657 01/01/23  0740 12/31/22  0928   * 134* 135*   K 4.1 3.8 4.7   CL 99 102 103   CO2 27 26 26   * 109* 122*   BUN 28* 36* 27*   CREA 0.76 1.14* 1.02   CA 8.1* 8.0* 8.4*   ALB 2.2* 2.1*  --    TBILI 0.7 0.5  --    ALT 13 13  --        No results for input(s): PH, PCO2, PO2, HCO3, FIO2 in the last 72 hours. Recent Results (from the past 24 hour(s))   GLUCOSE, POC    Collection Time: 01/01/23  4:55 PM   Result Value Ref Range    Glucose (POC) 143 (H) 65 - 100 mg/dL    Performed by Hosea Jamil    CBC WITH AUTOMATED DIFF    Collection Time: 01/02/23  6:57 AM   Result Value Ref Range    WBC 9.6 3.6 - 11.0 K/uL    RBC 2.54 (L) 3.80 - 5.20 M/uL    HGB 7.4 (L) 11.5 - 16.0 g/dL    HCT 22.6 (L) 35.0 - 47.0 %    MCV 89.0 80.0 - 99.0 FL    MCH 29.1 26.0 - 34.0 PG    MCHC 32.7 30.0 - 36.5 g/dL    RDW 14.6 (H) 11.5 - 14.5 %    PLATELET 029 (L) 559 - 400 K/uL    MPV 10.6 8.9 - 12.9 FL    NRBC 0.0 0.0  WBC    ABSOLUTE NRBC 0.00 0.00 - 0.01 K/uL    NEUTROPHILS 81 (H) 32 - 75 %    LYMPHOCYTES 10 (L) 12 - 49 %    MONOCYTES 6 5 - 13 %    EOSINOPHILS 2 0 - 7 %    BASOPHILS 0 0 - 1 %    IMMATURE GRANULOCYTES 1 (H) 0 - 0.5 %    ABS. NEUTROPHILS 7.9 1.8 - 8.0 K/UL    ABS. LYMPHOCYTES 1.0 0.8 - 3.5 K/UL    ABS. MONOCYTES 0.5 0.0 - 1.0 K/UL    ABS. EOSINOPHILS 0.1 0.0 - 0.4 K/UL    ABS. BASOPHILS 0.0 0.0 - 0.1 K/UL    ABS. IMM.  GRANS. 0.1 (H) 0.00 - 0.04 K/UL    DF AUTOMATED     METABOLIC PANEL, COMPREHENSIVE    Collection Time: 01/02/23  6:57 AM   Result Value Ref Range    Sodium 131 (L) 136 - 145 mmol/L    Potassium 4.1 3.5 - 5.1 mmol/L    Chloride 99 97 - 108 mmol/L    CO2 27 21 - 32 mmol/L    Anion gap 5 5 - 15 mmol/L    Glucose 113 (H) 65 - 100 mg/dL    BUN 28 (H) 6 - 20 mg/dL    Creatinine 0.76 0.55 - 1.02 mg/dL    BUN/Creatinine ratio 37 (H) 12 - 20      eGFR >60 >60 ml/min/1.73m2    Calcium 8.1 (L) 8.5 - 10.1 mg/dL    Bilirubin, total 0.7 0.2 - 1.0 mg/dL    AST (SGOT) 41 (H) 15 - 37 U/L    ALT (SGPT) 13 12 - 78 U/L    Alk. phosphatase 57 45 - 117 U/L    Protein, total 4.8 (L) 6.4 - 8.2 g/dL    Albumin 2.2 (L) 3.5 - 5.0 g/dL    Globulin 2.6 2.0 - 4.0 g/dL    A-G Ratio 0.8 (L) 1.1 - 2.2               _____________________________________________________________________________  Time spent in direct care including coordination of service, review of data and examination: > 35 minutes    ______________________________________________________________________________    Leidy Bradford NP    This is dictation was done by dragon, computer voice recognition software. Quite often unanticipated grammatical, syntax, homophones and other interpretive errors or inadvertently transcribed by the computer software. Please excuse errors that have escaped final proofreading. Thank you.

## 2023-01-02 NOTE — PROGRESS NOTES
Pulmonary and Critical Care progress note    Subjective:   Consult Note: 1/2/2023 @no control      Chief Complaint:   Chief Complaint   Patient presents with    Fall        This patient has been seen and evaluated at the request of Dr. Pura Morton and Dr. Keith Gatica    Patient seen and examined  Overnight events noted    Lying in bed comfortably  On room air  Awake and alert  No acute distress  S/p right hip surgery 12/30/2022     Review of Systems:  A comprehensive review of systems was negative except for that written in the HPI.        Current Facility-Administered Medications   Medication Dose Route Frequency Provider Last Rate Last Admin    sodium chloride (NS) flush 5-40 mL  5-40 mL IntraVENous Q8H Jayme Galindo MD   10 mL at 01/02/23 0512    sodium chloride (NS) flush 5-40 mL  5-40 mL IntraVENous PRN Jayme Galindo MD        traMADoL Kolleen Copping) tablet 50 mg  50 mg Oral Q6H PRN Jayme Galindo MD        senna-docusate (PERICOLACE) 8.6-50 mg per tablet 1 Tablet  1 Tablet Oral BID Jayme Galindo MD   1 Tablet at 01/01/23 1031    bisacodyL (DULCOLAX) suppository 10 mg  10 mg Rectal DAILY PRN Jayme Galindo MD        aspirin delayed-release tablet 325 mg  325 mg Oral BID Jayme Galindo MD   325 mg at 01/02/23 1047    celecoxib (CELEBREX) capsule 200 mg  200 mg Oral DAILY Jayme Galindo MD   200 mg at 01/02/23 1036    meropenem (MERREM) 1 g in 0.9% sodium chloride (MBP/ADV) 50 mL MBP  1 g IntraVENous Q8H Jayme Galindo  mL/hr at 01/02/23 0512 1 g at 01/02/23 0512    albuterol CONCENTRATE 2.5mg/0.5 mL neb soln  2.5 mg Nebulization Q4H PRN Jayme Galindo MD   2.5 mg at 12/31/22 0110    allopurinoL (ZYLOPRIM) tablet 300 mg  300 mg Oral DAILY Jayme Galindo MD   300 mg at 01/02/23 1039    cetirizine (ZYRTEC) tablet 10 mg  10 mg Oral QHS Jayme Galindo MD   10 mg at 01/01/23 2127    cyanocobalamin (VITAMIN B12) tablet 2,000 mcg  2,000 mcg Oral DAILY Anita Jacobsen MD   2,000 mcg at 01/02/23 1036    . PHARMACY TO SUBSTITUTE PER PROTOCOL (Reordered from: d-mannose (AZO D-Mannose) 500 mg cap)    Per Protocol Anita Jacobsen MD        ergocalciferol capsule 50,000 Units  50,000 Units Oral Q7D Anita Jacobsen MD        furosemide (LASIX) tablet 20 mg  20 mg Oral DAILY Anita Jacobsen MD   20 mg at 01/02/23 1037    lisinopriL (PRINIVIL, ZESTRIL) tablet 2.5 mg  2.5 mg Oral DAILY Anita Jacobsen MD   2.5 mg at 01/02/23 1037    memantine (NAMENDA) tablet 5 mg  5 mg Oral DAILY Anita Jacobsen MD   5 mg at 01/02/23 1038    metoprolol succinate (TOPROL-XL) XL tablet 50 mg  50 mg Oral DAILY Anita Jacobsen MD   50 mg at 01/02/23 1039    montelukast (SINGULAIR) tablet 10 mg  10 mg Oral DAILY Anita Jacobsen MD   10 mg at 01/02/23 1036    neomycin-bacitracin-polymyxin (NEOSPORIN) ointment   Topical QID Anita Jacobsen MD   Given at 01/02/23 1040    acidophilus-pectin, citrus tablet 1 Tablet  1 Tablet Oral BID Anita Jacobsen MD   1 Tablet at 01/02/23 1036    sertraline (ZOLOFT) tablet 50 mg  50 mg Oral DAILY Anita Jacobsen MD   50 mg at 01/02/23 1036    budesonide-formoteroL (SYMBICORT) 160-4.5 mcg/actuation HFA inhaler 2 Puff  2 Puff Inhalation BID Anita Jacobsen MD   2 Puff at 01/02/23 6864    thyroid (Pork) (ARMOUR) tablet 60 mg  60 mg Oral DAILY Anita Jacobsen MD   60 mg at 01/02/23 0900    sodium chloride (NS) flush 5-40 mL  5-40 mL IntraVENous Q8H Anita Jacobsen MD   10 mL at 01/01/23 1740    sodium chloride (NS) flush 5-40 mL  5-40 mL IntraVENous PRN Anita Jacobsen MD        polyethylene glycol (MIRALAX) packet 17 g  17 g Oral DAILY PRN Anita Jacobsen MD        ondansetron (ZOFRAN ODT) tablet 4 mg  4 mg Oral Q8H PRN Anitadomenic Jacobsen MD        Or    ondansetron (ZOFRAN) injection 4 mg  4 mg IntraVENous Q6H PRN Anita Jacobsen MD        acetaminophen (TYLENOL) tablet 1,000 mg 1,000 mg Oral Q6H Laney Hubbard MD   1,000 mg at 23 1036          Allergies   Allergen Reactions    Cipro [Ciprofloxacin Hcl] Other (comments)    Ketek [Telithromycin] Other (comments)    Mobic [Meloxicam] Other (comments)    Penicillin Other (comments)    Shrimp Extract Other (comments)           Objective:     Blood pressure (!) 145/82, pulse 89, temperature 97.5 °F (36.4 °C), resp. rate 18, weight 71.7 kg (158 lb 1.1 oz), SpO2 98 %. Temp (24hrs), Av.7 °F (36.5 °C), Min:96.8 °F (36 °C), Max:98.1 °F (36.7 °C)      Intake and Output:  Current Shift: 701 - 1900  In: -   Out: 250 [Urine:250]  Last 3 Shifts: 1901 -  0700  In: 2190 [P.O.:1840; I.V.:350]  Out: 900 [Urine:900]    Intake/Output Summary (Last 24 hours) at 2023 1134  Last data filed at 2023 1055  Gross per 24 hour   Intake 950 ml   Output 1150 ml   Net -200 ml          Physical Exam:     General: Lying in bed comfortably, no acute distress  Eye: Reactive, symmetric  Throat and Neck: Supple  Lung: Reduced air entry bilaterally with prolonged exhalation but no wheezing. Occasional crackles. Heart: S1+S2. No murmurs  Abdomen: soft, non-tender. Bowel sounds normal. No masses; obese  Extremities: No edema  : Not done  Skin: No cyanosis  Neurologic: A & O x3.   Grossly nonfocal  Psychiatric: Appropriate affect; coherent      Lab/Data Review:    Recent Results (from the past 24 hour(s))   GLUCOSE, POC    Collection Time: 23  4:55 PM   Result Value Ref Range    Glucose (POC) 143 (H) 65 - 100 mg/dL    Performed by Dinesh Aquas    CBC WITH AUTOMATED DIFF    Collection Time: 23  6:57 AM   Result Value Ref Range    WBC 9.6 3.6 - 11.0 K/uL    RBC 2.54 (L) 3.80 - 5.20 M/uL    HGB 7.4 (L) 11.5 - 16.0 g/dL    HCT 22.6 (L) 35.0 - 47.0 %    MCV 89.0 80.0 - 99.0 FL    MCH 29.1 26.0 - 34.0 PG    MCHC 32.7 30.0 - 36.5 g/dL    RDW 14.6 (H) 11.5 - 14.5 %    PLATELET 762 (L) 193 - 400 K/uL    MPV 10.6 8.9 - 12.9 FL NRBC 0.0 0.0  WBC    ABSOLUTE NRBC 0.00 0.00 - 0.01 K/uL    NEUTROPHILS 81 (H) 32 - 75 %    LYMPHOCYTES 10 (L) 12 - 49 %    MONOCYTES 6 5 - 13 %    EOSINOPHILS 2 0 - 7 %    BASOPHILS 0 0 - 1 %    IMMATURE GRANULOCYTES 1 (H) 0 - 0.5 %    ABS. NEUTROPHILS 7.9 1.8 - 8.0 K/UL    ABS. LYMPHOCYTES 1.0 0.8 - 3.5 K/UL    ABS. MONOCYTES 0.5 0.0 - 1.0 K/UL    ABS. EOSINOPHILS 0.1 0.0 - 0.4 K/UL    ABS. BASOPHILS 0.0 0.0 - 0.1 K/UL    ABS. IMM. GRANS. 0.1 (H) 0.00 - 0.04 K/UL    DF AUTOMATED     METABOLIC PANEL, COMPREHENSIVE    Collection Time: 01/02/23  6:57 AM   Result Value Ref Range    Sodium 131 (L) 136 - 145 mmol/L    Potassium 4.1 3.5 - 5.1 mmol/L    Chloride 99 97 - 108 mmol/L    CO2 27 21 - 32 mmol/L    Anion gap 5 5 - 15 mmol/L    Glucose 113 (H) 65 - 100 mg/dL    BUN 28 (H) 6 - 20 mg/dL    Creatinine 0.76 0.55 - 1.02 mg/dL    BUN/Creatinine ratio 37 (H) 12 - 20      eGFR >60 >60 ml/min/1.73m2    Calcium 8.1 (L) 8.5 - 10.1 mg/dL    Bilirubin, total 0.7 0.2 - 1.0 mg/dL    AST (SGOT) 41 (H) 15 - 37 U/L    ALT (SGPT) 13 12 - 78 U/L    Alk. phosphatase 57 45 - 117 U/L    Protein, total 4.8 (L) 6.4 - 8.2 g/dL    Albumin 2.2 (L) 3.5 - 5.0 g/dL    Globulin 2.6 2.0 - 4.0 g/dL    A-G Ratio 0.8 (L) 1.1 - 2.2         XR FLUOROSCOPY UNDER 60 MINUTES   Final Result   Right hip ORIF in progress. CT Results  (Last 48 hours)      None              Assessment:     1. Bilateral pneumonia  2. Asthma  3. Right hip fracture  4. Preoperative clearance  5. UTI  6. Hypertension  7.   History of dementia    Plan:     Patient admitted to the hospital  Will be watched here closely    Currently on room air  Use supplemental oxygen if needed to keep saturation above 92%    Chest x-ray shows bilateral patchy infiltrates suspicious for pneumonia or volume excess  Continue antibiotics  Culture sent  Further changes in antibiotics based on clinical response and culture results    Patient has a acute right hip fracture  Appreciate Ortho input  S/p surgery 12/30  Antibiotics for UTI    Continue with her medications  Continue medications for gout    DVT and GI prophylaxis    Questions of patient were answered at bedside in detail  Case discussed in detail with RN, RT, and care team  Thank you for involving me in the care of this patient  I will follow with you closely during hospitalization    Time spent more than 30 minutes in direct patient care with no overlap    Kisha Watts MD  [facility]  1/2/2023  11:25 AM

## 2023-01-02 NOTE — PROGRESS NOTES
Problem: Pain  Goal: *Control of Pain  Outcome: Progressing Towards Goal     Problem: Falls - Risk of  Goal: *Absence of Falls  Description: Document Shreya Fall Risk and appropriate interventions in the flowsheet.   Outcome: Progressing Towards Goal  Note: Fall Risk Interventions:  Mobility Interventions: Bed/chair exit alarm, OT consult for ADLs, Patient to call before getting OOB, PT Consult for mobility concerns, PT Consult for assist device competence, Strengthening exercises (ROM-active/passive), Utilize walker, cane, or other assistive device, Utilize gait belt for transfers/ambulation    Mentation Interventions: Bed/chair exit alarm, Increase mobility, Toileting rounds    Medication Interventions: Bed/chair exit alarm, Patient to call before getting OOB, Teach patient to arise slowly    Elimination Interventions: Bed/chair exit alarm, Call light in reach, Patient to call for help with toileting needs    History of Falls Interventions: Bed/chair exit alarm

## 2023-01-02 NOTE — PROGRESS NOTES
Problem: Pressure Injury - Risk of  Goal: *Prevention of pressure injury  Description: Document Nirmal Scale and appropriate interventions in the flowsheet. Outcome: Progressing Towards Goal  Note: Pressure Injury Interventions:  Sensory Interventions: Float heels, Minimize linen layers, Turn and reposition approx. every two hours (pillows and wedges if needed), Discuss PT/OT consult with provider    Moisture Interventions: Absorbent underpads, Internal/External urinary devices, Minimize layers    Activity Interventions: Increase time out of bed, PT/OT evaluation    Mobility Interventions: HOB 30 degrees or less, PT/OT evaluation, Turn and reposition approx. every two hours(pillow and wedges)    Nutrition Interventions: Document food/fluid/supplement intake, Offer support with meals,snacks and hydration    Friction and Shear Interventions: HOB 30 degrees or less, Minimize layers                Problem: Patient Education: Go to Patient Education Activity  Goal: Patient/Family Education  Outcome: Progressing Towards Goal     Problem: Pain  Goal: *Control of Pain  Outcome: Progressing Towards Goal     Problem: Patient Education: Go to Patient Education Activity  Goal: Patient/Family Education  Outcome: Progressing Towards Goal     Problem: Falls - Risk of  Goal: *Absence of Falls  Description: Document Shreya Fall Risk and appropriate interventions in the flowsheet.   Outcome: Progressing Towards Goal  Note: Fall Risk Interventions:  Mobility Interventions: Bed/chair exit alarm, OT consult for ADLs, Patient to call before getting OOB, PT Consult for mobility concerns, PT Consult for assist device competence, Strengthening exercises (ROM-active/passive), Utilize walker, cane, or other assistive device, Utilize gait belt for transfers/ambulation    Mentation Interventions: Bed/chair exit alarm, Increase mobility, Toileting rounds    Medication Interventions: Bed/chair exit alarm, Patient to call before getting OOB, Teach patient to arise slowly    Elimination Interventions: Bed/chair exit alarm, Call light in reach, Patient to call for help with toileting needs    History of Falls Interventions: Bed/chair exit alarm         Problem: Patient Education: Go to Patient Education Activity  Goal: Patient/Family Education  Outcome: Progressing Towards Goal     Problem: Falls - Risk of  Goal: *Absence of Falls  Description: Document Gabino Valencia Fall Risk and appropriate interventions in the flowsheet.   Outcome: Progressing Towards Goal  Note: Fall Risk Interventions:  Mobility Interventions: Bed/chair exit alarm, OT consult for ADLs, Patient to call before getting OOB, PT Consult for mobility concerns, PT Consult for assist device competence, Strengthening exercises (ROM-active/passive), Utilize walker, cane, or other assistive device, Utilize gait belt for transfers/ambulation    Mentation Interventions: Bed/chair exit alarm, Increase mobility, Toileting rounds    Medication Interventions: Bed/chair exit alarm, Patient to call before getting OOB, Teach patient to arise slowly    Elimination Interventions: Bed/chair exit alarm, Call light in reach, Patient to call for help with toileting needs    History of Falls Interventions: Bed/chair exit alarm         Problem: Patient Education: Go to Patient Education Activity  Goal: Patient/Family Education  Outcome: Progressing Towards Goal     Problem: Patient Education: Go to Patient Education Activity  Goal: Patient/Family Education  Outcome: Progressing Towards Goal     Problem: Patient Education: Go to Patient Education Activity  Goal: Patient/Family Education  Outcome: Progressing Towards Goal

## 2023-01-02 NOTE — PROGRESS NOTES
Problem: Mobility Impaired (Adult and Pediatric)  Goal: *Acute Goals and Plan of Care (Insert Text)  Description: Patient stated goal: get better  Patient will move from supine to sit and sit to supine , scoot up and down, and roll side to side in bed with minimal assistance/contact guard assist within 7 day(s). Patient will transfer from bed to chair and chair to bed with minimal assistance/contact guard assist using the least restrictive device within 7 day(s). Patient will improve static standing balance to minimal assistance within 1 week(s). Patient will ambulate 20 feet with minimal assistance with least restrictive device within 1 weeks. Outcome: Progressing Towards Goal   PHYSICAL THERAPY TREATMENT  Patient: Rodney Tristan (24 y.o. female)  Date: 1/2/2023  Diagnosis: Hip fracture ([facility] [address] [S72.009A]  UTI (urinary tract infection) [N39.0] <principal problem not specified>  Procedure(s) (LRB):  RIGHT FEMUR GAMMA NAIL INSERTION (Right) 3 Days Post-Op  Precautions:  WBAT R LE  Chart, physical therapy assessment, plan of care and goals were reviewed. ASSESSMENT  Patient continues with skilled PT services and is progressing towards goals. Pt received semi supine  upon PT/OT  arrival, agreeable to session. (See below for objective details and assist levels). Overall pt tolerated session fair today with bed mobility. Pt. performed LE exercises supine. Pt. limited with PT this session due to continuous BM and required constant cleaning of pt. Then Pt. Was placed on bedpan due to continuous BM s. RN notified of pt. On bedpan. Daughter and pt. Aware to call nurse once done on bedpan. Will continue to benefit from skilled PT services, and will continue to progress as tolerated. Current Level of Function Impacting Discharge (mobility/balance):  Mod/max A X 2 for mobility    Other factors to consider for discharge: safety/cognitive impairments/PLOF/A X 2 needed         PLAN :  Patient continues to benefit from skilled intervention to address the above impairments. Continue treatment per established plan of care to address goals. Recommend with staff: Encourage HEP in prep for ADLs/mobility, Frequent repositioning to prevent skin breakdown, and Use of bed/chair alarm for safety    Recommendation for discharge: (in order for the patient to meet his/her long term goals)  Ruddy Frey    This discharge recommendation:  Has been made in collaboration with the attending provider and/or case management    IF patient discharges home will need the following DME: to be determined (TBD)       SUBJECTIVE:   Patient stated I'm ok. \" Daughter present during session. \"I don't have any pain. \"    OBJECTIVE DATA SUMMARY:   Critical Behavior:  Neurologic State: Alert  Orientation Level: Oriented X4  Cognition: Follows commands, Decreased attention/concentration ([place])     Functional Mobility Training:  Bed Mobility:  Rolling: Maximum assistance;Assist x2; Additional time   Pt. Placed on bedpan at end of session. Notified RN of session. OT/PT assisted with cleaning pt. Several times (Pt had BM 7 times and still continued to have them.) Pt. then placed on bedpan. Transfers:                  Balance:     Ambulation/Gait Training:                           Therapeutic Exercises:       EXERCISE   Sets   Reps   Active Active Assist   Passive Self ROM   Comments   Ankle Pumps  20 [x] [] [] []    Quad Sets/Glut Sets  20 [x] [] [] []    Hamstring Sets   [] [] [] []    Short Arc Quads   [] [] [] []    Heel Slides  15 [] [x] [] [] Mod assist for R LE   Straight Leg Raises   [] [] [] []    Hip abd/add  15 [] [x] [] [] Mod assist for R LE   Long Arc Quads   [] [] [] []    Marching   [] [] [] []       [] [] [] []     Reviewed all LE exercises with pt. And daughter. Pt./daughter understood instruction.   Pain Ratin/10 R LE with movement      Activity Tolerance:   Fair and requires rest breaks    After treatment patient left in no apparent distress:   Bed locked and returned to lowest position, Supine in bed, Call bell within reach, Bed / chair alarm activated, Caregiver / family present, Side rails x 3, and RN notified pt. On bedpan and of session. COMMUNICATION/COLLABORATION:   The patients plan of care was discussed with: Occupational therapy assistant and Registered nurse. [place] with OT for increased safety and mobility for pt and clinician.      Ezequiel Reynoso, PT   Time Calculation: 39 mins

## 2023-01-02 NOTE — PROGRESS NOTES
OCCUPATIONAL THERAPY TREATMENT  Patient: Hiren Su (89 y.o. female)  Date: 1/2/2023  Diagnosis: Hip fracture ([facility] [address] [S72.009A]  UTI (urinary tract infection) [N39.0] <principal problem not specified>  Procedure(s) (LRB):  RIGHT FEMUR GAMMA NAIL INSERTION (Right) 3 Days Post-Op  Precautions: FALL  Chart, occupational therapy assessment, plan of care, and goals were reviewed. ASSESSMENT  Pt continues with skilled OT services and is progressing towards goals. Pt received semi-supine in bed upon arrival, AXO x4 and agreeable to DOMINGUEZ/PTA tx at this time. Pt cooperative and demonstrated fair effort during activities. Pt tolerated therapy session fairly and reported voiding in bed. Pt req'd increased assistance to roll L/R several times as pt had continues bowel movements with additional time d/t pain and weakness. Pt continues to need vc's for proper technique d/t [place] and impaired cognition. Pt engaged in beth UE exercises with increased repetition and vc's to maximize ROM, see grid below. Pain in R HIP/weakness and impaired cognition continues to limit pt's performance in ADl'S and functional tf's. Pt left on bed pan as she continued to have BM'S and nursing notified. Overall, pt continues to present with deficits in generalized strength/AROM, dynamic sitting balance, static/dynamic standing balance, pain, impaired cognition (dementia) and functional activity tolerance during performance of ADLs/mobility (see below for objective details and assist levels). Will continue to progress. Recommend d/c to SNF once medically appropriate. Other factors to consider for discharge: Time of onset, medical prognosis/diagnosis, severity of deficits, PLOF, functional baseline, home environment, and family support          PLAN :  Patient continues to benefit from skilled intervention to address the above impairments. Continue treatment per established plan of care. to address goals.     Recommend with staff: Out of bed to chair for meals, Encourage HEP in prep for ADLs/mobility, and Frequent repositioning to prevent skin breakdown    Recommend next session: Toileting    Recommendation for discharge: (in order for the patient to meet his/her long term goals)  Ruddy Frey    This discharge recommendation:  Has been made in collaboration with the attending provider and/or case management       IF patient discharges home will need the following DME: TBD       SUBJECTIVE:   Patient stated Oh no, I'm going again.     OBJECTIVE DATA SUMMARY:   Cognitive/Behavioral Status:  Neurologic State: Alert  Orientation Level: Oriented X4  Cognition: Follows commands;Decreased attention/concentration ([place])             Functional Mobility and Transfers for ADLs:  Bed Mobility:  Rolling: Maximum assistance;Assist x2; Additional time    Transfers:             Balance:       ADL Intervention:       Grooming  Grooming Assistance: Set-up  Position Performed: Long sitting on bed  Washing Face: Set-up  Washing Hands: [address]: Total assistance(dependent)  Bowel Hygiene: Total assistance (dependent)  Clothing Management: Total assistance (dependent)             Exercise Sets Reps AROM AAROM PROM Self PROM Comments   Elbow E/F 1 12 [x] [] [] [] Long sitting, some assistance with proper technique   Chest press 1 12 [x] [] [] []    Ceiling punches 1 12 [x] [] [] []         Pain:  8/10 R hip with movement    Activity Tolerance:   Fair and requires rest breaks    After treatment patient left in no apparent distress:   Supine in bed, Call bell within reach, Caregiver / family present, and Side rails x 3, bed locked and in lowest position      COMMUNICATION/COLLABORATION:   The patients plan of care was discussed with: Physical therapy assistant and Registered nurse.  Co-tx with PTA for increased pt/clinician safety with OOB activity         ANGELICA Verdugo  Time Calculation: 39 mins     Problem: Self Care Deficits Care Plan (Adult)  Goal: *Acute Goals and Plan of Care (Insert Text)  Description: FUNCTIONAL STATUS PRIOR TO ADMISSION: pt/family reports she was using rollator for ambulation and staff A w/ ADLs. Family reports 2 falls within the last 3 months. HOME SUPPORT: Pt lives at [facility] in memory care unit.     Occupational Therapy Goals  Initiated 12/31/2022    Pt stated goal I want to get better  Pt will be IND sup <> sit in prep for EOB ADLs  Pt will be mod I grooming standing sink side LRAD  Pt will be min A UB dressing sitting EOB/long sit   Pt will be min A LE dressing sitting EOB/long sit  Pt will be Mod I sit <>  prep for toileting LRAD  Pt will be Mod I toileting/toilet transfer/cloth mgmt LRAD  Pt will be IND following UE HEP in prep for self care tasks   Outcome: Progressing Towards Goal

## 2023-01-03 LAB
ALBUMIN SERPL-MCNC: 2.2 G/DL (ref 3.5–5)
ALBUMIN/GLOB SERPL: 0.7 {RATIO} (ref 1.1–2.2)
ALP SERPL-CCNC: 68 U/L (ref 45–117)
ALT SERPL-CCNC: 12 U/L (ref 12–78)
ANION GAP SERPL CALC-SCNC: 5 MMOL/L (ref 5–15)
AST SERPL W P-5'-P-CCNC: 35 U/L (ref 15–37)
BASOPHILS # BLD: 0 K/UL (ref 0–0.1)
BASOPHILS NFR BLD: 0 % (ref 0–1)
BILIRUB SERPL-MCNC: 1 MG/DL (ref 0.2–1)
BUN SERPL-MCNC: 20 MG/DL (ref 6–20)
BUN/CREAT SERPL: 34 (ref 12–20)
CA-I BLD-MCNC: 8.4 MG/DL (ref 8.5–10.1)
CHLORIDE SERPL-SCNC: 97 MMOL/L (ref 97–108)
CO2 SERPL-SCNC: 29 MMOL/L (ref 21–32)
CREAT SERPL-MCNC: 0.59 MG/DL (ref 0.55–1.02)
DIFFERENTIAL METHOD BLD: ABNORMAL
EOSINOPHIL # BLD: 0.2 K/UL (ref 0–0.4)
EOSINOPHIL NFR BLD: 2 % (ref 0–7)
ERYTHROCYTE [DISTWIDTH] IN BLOOD BY AUTOMATED COUNT: 14.7 % (ref 11.5–14.5)
GLOBULIN SER CALC-MCNC: 3.1 G/DL (ref 2–4)
GLUCOSE BLD STRIP.AUTO-MCNC: 134 MG/DL (ref 65–100)
GLUCOSE SERPL-MCNC: 106 MG/DL (ref 65–100)
HCT VFR BLD AUTO: 26.7 % (ref 35–47)
HGB BLD-MCNC: 9 G/DL (ref 11.5–16)
IMM GRANULOCYTES # BLD AUTO: 0 K/UL (ref 0–0.04)
IMM GRANULOCYTES NFR BLD AUTO: 0 % (ref 0–0.5)
LYMPHOCYTES # BLD: 1.2 K/UL (ref 0.8–3.5)
LYMPHOCYTES NFR BLD: 15 % (ref 12–49)
MCH RBC QN AUTO: 29.6 PG (ref 26–34)
MCHC RBC AUTO-ENTMCNC: 33.7 G/DL (ref 30–36.5)
MCV RBC AUTO: 87.8 FL (ref 80–99)
MONOCYTES # BLD: 0.6 K/UL (ref 0–1)
MONOCYTES NFR BLD: 7 % (ref 5–13)
NEUTS SEG # BLD: 6 K/UL (ref 1.8–8)
NEUTS SEG NFR BLD: 76 % (ref 32–75)
NRBC # BLD: 0 K/UL (ref 0–0.01)
NRBC BLD-RTO: 0 PER 100 WBC
PERFORMED BY, TECHID: ABNORMAL
PLATELET # BLD AUTO: 163 K/UL (ref 150–400)
PMV BLD AUTO: 9.9 FL (ref 8.9–12.9)
POTASSIUM SERPL-SCNC: 3.6 MMOL/L (ref 3.5–5.1)
PROT SERPL-MCNC: 5.3 G/DL (ref 6.4–8.2)
RBC # BLD AUTO: 3.04 M/UL (ref 3.8–5.2)
SODIUM SERPL-SCNC: 131 MMOL/L (ref 136–145)
WBC # BLD AUTO: 7.9 K/UL (ref 3.6–11)

## 2023-01-03 PROCEDURE — 82962 GLUCOSE BLOOD TEST: CPT

## 2023-01-03 PROCEDURE — 74011000250 HC RX REV CODE- 250: Performed by: ORTHOPAEDIC SURGERY

## 2023-01-03 PROCEDURE — 94640 AIRWAY INHALATION TREATMENT: CPT

## 2023-01-03 PROCEDURE — 74011250637 HC RX REV CODE- 250/637: Performed by: ORTHOPAEDIC SURGERY

## 2023-01-03 PROCEDURE — 80053 COMPREHEN METABOLIC PANEL: CPT

## 2023-01-03 PROCEDURE — 36415 COLL VENOUS BLD VENIPUNCTURE: CPT

## 2023-01-03 PROCEDURE — 65270000029 HC RM PRIVATE

## 2023-01-03 PROCEDURE — 74011000258 HC RX REV CODE- 258: Performed by: ORTHOPAEDIC SURGERY

## 2023-01-03 PROCEDURE — 94761 N-INVAS EAR/PLS OXIMETRY MLT: CPT

## 2023-01-03 PROCEDURE — 74011250636 HC RX REV CODE- 250/636: Performed by: INTERNAL MEDICINE

## 2023-01-03 PROCEDURE — 85025 COMPLETE CBC W/AUTO DIFF WBC: CPT

## 2023-01-03 PROCEDURE — 74011250636 HC RX REV CODE- 250/636: Performed by: ORTHOPAEDIC SURGERY

## 2023-01-03 RX ADMIN — ACETAMINOPHEN 1000 MG: 500 TABLET ORAL at 17:33

## 2023-01-03 RX ADMIN — Medication 1 TABLET: at 22:36

## 2023-01-03 RX ADMIN — SODIUM CHLORIDE 500 ML: 9 INJECTION, SOLUTION INTRAVENOUS at 20:01

## 2023-01-03 RX ADMIN — SODIUM CHLORIDE, PRESERVATIVE FREE 10 ML: 5 INJECTION INTRAVENOUS at 05:03

## 2023-01-03 RX ADMIN — LISINOPRIL 2.5 MG: 5 TABLET ORAL at 10:22

## 2023-01-03 RX ADMIN — ASPIRIN 325 MG: 325 TABLET, COATED ORAL at 22:36

## 2023-01-03 RX ADMIN — SODIUM CHLORIDE, PRESERVATIVE FREE 10 ML: 5 INJECTION INTRAVENOUS at 13:18

## 2023-01-03 RX ADMIN — ACETAMINOPHEN 1000 MG: 500 TABLET ORAL at 10:20

## 2023-01-03 RX ADMIN — SERTRALINE HYDROCHLORIDE 50 MG: 50 TABLET ORAL at 10:20

## 2023-01-03 RX ADMIN — ACETAMINOPHEN 1000 MG: 500 TABLET ORAL at 22:36

## 2023-01-03 RX ADMIN — MONTELUKAST 10 MG: 10 TABLET, FILM COATED ORAL at 10:20

## 2023-01-03 RX ADMIN — LEVOTHYROXINE, LIOTHYRONINE 60 MG: 9.5; 2.25 TABLET ORAL at 09:00

## 2023-01-03 RX ADMIN — Medication 1 TABLET: at 10:19

## 2023-01-03 RX ADMIN — CELECOXIB 200 MG: 200 CAPSULE ORAL at 10:20

## 2023-01-03 RX ADMIN — SODIUM CHLORIDE, PRESERVATIVE FREE 10 ML: 5 INJECTION INTRAVENOUS at 22:37

## 2023-01-03 RX ADMIN — ASPIRIN 325 MG: 325 TABLET, COATED ORAL at 10:20

## 2023-01-03 RX ADMIN — ALLOPURINOL 300 MG: 100 TABLET ORAL at 10:29

## 2023-01-03 RX ADMIN — MEROPENEM 1 G: 1 INJECTION, POWDER, FOR SOLUTION INTRAVENOUS at 22:37

## 2023-01-03 RX ADMIN — METOPROLOL SUCCINATE 50 MG: 50 TABLET, EXTENDED RELEASE ORAL at 10:22

## 2023-01-03 RX ADMIN — FUROSEMIDE 20 MG: 40 TABLET ORAL at 10:20

## 2023-01-03 RX ADMIN — MEMANTINE HYDROCHLORIDE 5 MG: 10 TABLET ORAL at 10:19

## 2023-01-03 RX ADMIN — MEROPENEM 1 G: 1 INJECTION, POWDER, FOR SOLUTION INTRAVENOUS at 13:13

## 2023-01-03 RX ADMIN — MEROPENEM 1 G: 1 INJECTION, POWDER, FOR SOLUTION INTRAVENOUS at 05:03

## 2023-01-03 RX ADMIN — BUDESONIDE AND FORMOTEROL FUMARATE DIHYDRATE 2 PUFF: 160; 4.5 AEROSOL RESPIRATORY (INHALATION) at 19:41

## 2023-01-03 RX ADMIN — CYANOCOBALAMIN TAB 500 MCG 2000 MCG: 500 TAB at 10:21

## 2023-01-03 NOTE — PROGRESS NOTES
Spoke to family at bedside. Family is interested in if we think (medical Team) patient is ready for hospice or at a point where we need hospice. And then if we could do hospice at her [facility]. Perfect served provider to come and speak with family about need for hospice or if their is one. Will call [place] [facility] and speak to them about ability to do hospice there. Finalized plans for hospice with family at [facility] with [facility] hospice. Ordering equipment with plans for discharge tomorrow. Request for 1000 discharge.

## 2023-01-03 NOTE — PROGRESS NOTES
Attempted PT 1400 however pt resting peacefully with daughter reporting first rest in 3 days. Will continue to  follow and attempt at a later time. Thank you.

## 2023-01-03 NOTE — PROGRESS NOTES
OT tx session attempted at 1400, however pt sleeping at this time. Daughter reporting pt has not slept in nights and would like for pt to rest at this time. Will continue to follow pt and attempt tx session at a later time as time allows. Thank you.

## 2023-01-03 NOTE — PROGRESS NOTES
Orthopedic progress note    Date:1/3/2023       Room:[facility]  Patient Kimi Sparks     YOB: 1934     Age:89 y.o. Subjective    80year-old female status post ORIF right hip. Postop day #4. Patient is sleeping on my arrival.  She does not appear in any distress. Hard to arouse from sleep. She complains of minimal pain of her right hip. No other complaints at this time.   Objective           Vitals Last 24 Hours:  TEMPERATURE:  Temp  Av.8 °F (36.6 °C)  Min: 97.5 °F (36.4 °C)  Max: 98 °F (36.7 °C)  RESPIRATIONS RANGE: Resp  Av.7  Min: 17  Max: 18  PULSE OXIMETRY RANGE: SpO2  Av.7 %  Min: 93 %  Max: 98 %  PULSE RANGE: Pulse  Av  Min: 73  Max: 89  BLOOD PRESSURE RANGE: Systolic (00XGP), ODB:268 , Min:119 , QDD:434   ; Diastolic (15JPP), FGY:27, Min:65, Max:78    Current Facility-Administered Medications   Medication Dose Route Frequency    sodium chloride (NS) flush 5-40 mL  5-40 mL IntraVENous Q8H    sodium chloride (NS) flush 5-40 mL  5-40 mL IntraVENous PRN    aspirin delayed-release tablet 325 mg  325 mg Oral BID    celecoxib (CELEBREX) capsule 200 mg  200 mg Oral DAILY    meropenem (MERREM) 1 g in 0.9% sodium chloride (MBP/ADV) 50 mL MBP  1 g IntraVENous Q8H    albuterol CONCENTRATE 2.5mg/0.5 mL neb soln  2.5 mg Nebulization Q4H PRN    allopurinoL (ZYLOPRIM) tablet 300 mg  300 mg Oral DAILY    cyanocobalamin (VITAMIN B12) tablet 2,000 mcg  2,000 mcg Oral DAILY    ergocalciferol capsule 50,000 Units  50,000 Units Oral Q7D    furosemide (LASIX) tablet 20 mg  20 mg Oral DAILY    lisinopriL (PRINIVIL, ZESTRIL) tablet 2.5 mg  2.5 mg Oral DAILY    memantine (NAMENDA) tablet 5 mg  5 mg Oral DAILY    metoprolol succinate (TOPROL-XL) XL tablet 50 mg  50 mg Oral DAILY    montelukast (SINGULAIR) tablet 10 mg  10 mg Oral DAILY    acidophilus-pectin, citrus tablet 1 Tablet  1 Tablet Oral BID    sertraline (ZOLOFT) tablet 50 mg  50 mg Oral DAILY    budesonide-formoteroL (SYMBICORT) 160-4.5 mcg/actuation HFA inhaler 2 Puff  2 Puff Inhalation BID    thyroid (Pork) (ARMOUR) tablet 60 mg  60 mg Oral DAILY    sodium chloride (NS) flush 5-40 mL  5-40 mL IntraVENous Q8H    sodium chloride (NS) flush 5-40 mL  5-40 mL IntraVENous PRN    acetaminophen (TYLENOL) tablet 1,000 mg  1,000 mg Oral Q6H          I/O (24Hr): Intake/Output Summary (Last 24 hours) at 1/3/2023 1014  Last data filed at 1/3/2023 0537  Gross per 24 hour   Intake 500 ml   Output 1950 ml   Net -1450 ml     Objective:  Vital signs: (most recent): Blood pressure (!) 142/65, pulse 78, temperature 97.5 °F (36.4 °C), resp. rate 18, weight 71.4 kg (157 lb 6.5 oz), SpO2 96 %. Labs/Imaging/Diagnostics    Labs:  CBC:  Recent Labs     01/02/23  0657 01/01/23  0740   WBC 9.6 10.4   RBC 2.54* 2.46*   HGB 7.4* 7.7*  7.7*   HCT 22.6* 23.0*   MCV 89.0 93.5   RDW 14.6* 15.4*   * 107*     CHEMISTRIES:  Recent Labs     01/02/23  0657 01/01/23  0740   * 134*   K 4.1 3.8   CL 99 102   CO2 27 26   BUN 28* 36*   CREA 0.76 1.14*   CA 8.1* 8.0*   PT/INR:No results for input(s): INR, INREXT in the last 72 hours. No lab exists for component: PROTIME  APTT:No results for input(s): APTT in the last 72 hours. LIVER PROFILE:  Recent Labs     01/02/23  0657 01/01/23  0740   AST 41* 31   ALT 13 13     Lab Results   Component Value Date/Time    ALT (SGPT) 13 01/02/2023 06:57 AM    AST (SGOT) 41 (H) 01/02/2023 06:57 AM    Alk. phosphatase 57 01/02/2023 06:57 AM    Bilirubin, total 0.7 01/02/2023 06:57 AM       Physical Exam:  Right hip: Dressing clean dry and intact. Mild swelling seen in right thigh. No tenderness to palpation the right thigh. No calf pain. DP/PT pulse are palpable. Cap refill is 2 seconds. Unable to assess EHL/DF/PF, patient cannot follow commands. Negative Homans. Right lower extremity appears neurovascularly intact.     Assessment//Plan           Patient Active Problem List    Diagnosis Date Noted    Hip fracture ([facility] 12/29/2022    UTI (urinary tract infection) 12/29/2022    Asthma attack 12/14/2022    Status asthmaticus with COPD (chronic obstructive pulmonary disease) ([facility] 12/14/2022    Dementia ([facility] 10/03/2021    Asthma 10/03/2021    Ill-defined condition 10/03/2021    Hypertension 10/03/2021     Status post ORIF right hip. Postop day #4. Continue with therapy as tolerated. Acute on chronic anemia. Awaiting a.m. lab work. anticipate patient will be ready for discharge today if her hemoglobin hematocrit are stable and no need for blood transfusion. Patient will follow up with Dr. Malinda Gu as outpatient approximate 2 weeks.         Electronically signed by Roxi Merrill PA-C on 1/3/2023 at 10:14 AM

## 2023-01-03 NOTE — PROGRESS NOTES
Rigjt hip dressing changed, staples clean dry and intact, patient wet cleaned up and applied barrier creme to her sacrum. Tolerated well. Daughter in at the bedside.

## 2023-01-03 NOTE — PROGRESS NOTES
Pulmonary and Critical Care progress note    Subjective:         Chief Complaint:   Chief Complaint   Patient presents with    Fall        Patient seen and examined  Overnight events noted  Only present at bedside. Patient has shown significant cognitive deterioration  No cough, pooling secretions at the back of her throat.     Opens eyes on command but unable to give any meaningful response on room air  No acute distress  S/p right hip surgery 12/30/2022     Review of Systems:  Will do any review of system because of patient's altered mental status    Current Facility-Administered Medications   Medication Dose Route Frequency Provider Last Rate Last Admin    sodium chloride (NS) flush 5-40 mL  5-40 mL IntraVENous Q8H Corby Olson MD   10 mL at 01/03/23 0503    sodium chloride (NS) flush 5-40 mL  5-40 mL IntraVENous PRN Corby Olson MD        aspirin delayed-release tablet 325 mg  325 mg Oral BID Corby Olson MD   325 mg at 01/03/23 1020    celecoxib (CELEBREX) capsule 200 mg  200 mg Oral DAILY Corby Olson MD   200 mg at 01/03/23 1020    meropenem (MERREM) 1 g in 0.9% sodium chloride (MBP/ADV) 50 mL MBP  1 g IntraVENous Q8H Corby Olson  mL/hr at 01/03/23 0503 1 g at 01/03/23 0503    albuterol CONCENTRATE 2.5mg/0.5 mL neb soln  2.5 mg Nebulization Q4H PRN Corby Olson MD   2.5 mg at 12/31/22 0110    allopurinoL (ZYLOPRIM) tablet 300 mg  300 mg Oral DAILY Corby Olson MD   300 mg at 01/03/23 1029    cyanocobalamin (VITAMIN B12) tablet 2,000 mcg  2,000 mcg Oral DAILY Corby Olson MD   2,000 mcg at 01/03/23 1021    ergocalciferol capsule 50,000 Units  50,000 Units Oral Q7D Corby Olson MD        furosemide (LASIX) tablet 20 mg  20 mg Oral DAILY Corby Olson MD   20 mg at 01/03/23 1020    lisinopriL (PRINIVIL, ZESTRIL) tablet 2.5 mg  2.5 mg Oral DAILY Corby Olson MD   2.5 mg at 01/03/23 1022    memantine (NAMENDA) tablet 5 mg 5 mg Oral DAILY Lucas Lee MD   5 mg at 23 1019    metoprolol succinate (TOPROL-XL) XL tablet 50 mg  50 mg Oral DAILY Lucas Lee MD   50 mg at 23 1022    montelukast (SINGULAIR) tablet 10 mg  10 mg Oral DAILY Lucas Lee MD   10 mg at 23 1020    acidophilus-pectin, citrus tablet 1 Tablet  1 Tablet Oral BID Lucas Lee MD   1 Tablet at 23 1019    sertraline (ZOLOFT) tablet 50 mg  50 mg Oral DAILY Lucas Lee MD   50 mg at 23 1020    budesonide-formoteroL (SYMBICORT) 160-4.5 mcg/actuation HFA inhaler 2 Puff  2 Puff Inhalation BID Lucas Lee MD   2 Puff at 23 2007    thyroid (Pork) (ARMOUR) tablet 60 mg  60 mg Oral DAILY Lucas Lee MD   60 mg at 23 0900    sodium chloride (NS) flush 5-40 mL  5-40 mL IntraVENous Q8H Lucas Lee MD   10 mL at 23 0503    sodium chloride (NS) flush 5-40 mL  5-40 mL IntraVENous PRN Lucas Lee MD        acetaminophen (TYLENOL) tablet 1,000 mg  1,000 mg Oral Q6H Lucas Lee MD   1,000 mg at 23 1020          Allergies   Allergen Reactions    Cipro [Ciprofloxacin Hcl] Other (comments)    Ketek [Telithromycin] Other (comments)    Mobic [Meloxicam] Other (comments)    Penicillin Other (comments)    Shrimp Extract Other (comments)           Objective:     Blood pressure (!) 142/65, pulse 78, temperature 97.5 °F (36.4 °C), resp. rate 18, weight 71.4 kg (157 lb 6.5 oz), SpO2 96 %. Temp (24hrs), Av.8 °F (36.6 °C), Min:97.5 °F (36.4 °C), Max:98 °F (36.7 °C)      Intake and Output:  Current Shift: No intake/output data recorded. Last 3 Shifts:  1901 - 01/03 0700  In: 5471 [P.O.:1150; I.V.:300]  Out: 2850 [Urine:2850]    Intake/Output Summary (Last 24 hours) at 1/3/2023 1256  Last data filed at 1/3/2023 0537  Gross per 24 hour   Intake 500 ml   Output 1700 ml   Net -1200 ml        Physical Exam:     General: Lying in bed altered mental status. Eye: Reactive, symmetric  Throat and Neck: Supple  Lung: Reduced air entry bilaterally with rhonchi. Heart: S1+S2. No murmurs  Abdomen: soft, non-tender. Bowel sounds normal. No masses; obese  Extremities: No edema  : Not done  Skin: No cyanosis  Neurologic: A & O x3. Grossly nonfocal  Psychiatric: Appropriate affect; coherent      Lab/Data Review:    Recent Results (from the past 24 hour(s))   CBC WITH AUTOMATED DIFF    Collection Time: 01/03/23 10:55 AM   Result Value Ref Range    WBC 7.9 3.6 - 11.0 K/uL    RBC 3.04 (L) 3.80 - 5.20 M/uL    HGB 9.0 (L) 11.5 - 16.0 g/dL    HCT 26.7 (L) 35.0 - 47.0 %    MCV 87.8 80.0 - 99.0 FL    MCH 29.6 26.0 - 34.0 PG    MCHC 33.7 30.0 - 36.5 g/dL    RDW 14.7 (H) 11.5 - 14.5 %    PLATELET 902 072 - 847 K/uL    MPV 9.9 8.9 - 12.9 FL    NRBC 0.0 0.0  WBC    ABSOLUTE NRBC 0.00 0.00 - 0.01 K/uL    NEUTROPHILS 76 (H) 32 - 75 %    LYMPHOCYTES 15 12 - 49 %    MONOCYTES 7 5 - 13 %    EOSINOPHILS 2 0 - 7 %    BASOPHILS 0 0 - 1 %    IMMATURE GRANULOCYTES 0 0 - 0.5 %    ABS. NEUTROPHILS 6.0 1.8 - 8.0 K/UL    ABS. LYMPHOCYTES 1.2 0.8 - 3.5 K/UL    ABS. MONOCYTES 0.6 0.0 - 1.0 K/UL    ABS. EOSINOPHILS 0.2 0.0 - 0.4 K/UL    ABS. BASOPHILS 0.0 0.0 - 0.1 K/UL    ABS. IMM. GRANS. 0.0 0.00 - 0.04 K/UL    DF AUTOMATED     METABOLIC PANEL, COMPREHENSIVE    Collection Time: 01/03/23 10:55 AM   Result Value Ref Range    Sodium 131 (L) 136 - 145 mmol/L    Potassium 3.6 3.5 - 5.1 mmol/L    Chloride 97 97 - 108 mmol/L    CO2 29 21 - 32 mmol/L    Anion gap 5 5 - 15 mmol/L    Glucose 106 (H) 65 - 100 mg/dL    BUN 20 6 - 20 mg/dL    Creatinine 0.59 0.55 - 1.02 mg/dL    BUN/Creatinine ratio 34 (H) 12 - 20      eGFR >60 >60 ml/min/1.73m2    Calcium 8.4 (L) 8.5 - 10.1 mg/dL    Bilirubin, total 1.0 0.2 - 1.0 mg/dL    AST (SGOT) 35 15 - 37 U/L    ALT (SGPT) 12 12 - 78 U/L    Alk.  phosphatase 68 45 - 117 U/L    Protein, total 5.3 (L) 6.4 - 8.2 g/dL    Albumin 2.2 (L) 3.5 - 5.0 g/dL    Globulin 3.1 2.0 - 4.0 g/dL    A-G Ratio 0.7 (L) 1.1 - 2.2         XR FLUOROSCOPY UNDER 60 MINUTES   Final Result   Right hip ORIF in progress. CT Results  (Last 48 hours)      None              Assessment:     1. Bilateral pneumonia  2. Asthma  3. Right hip fracture  4. Preoperative clearance  5. UTI  6. Hypertension  7. History of dementia    Plan:     Currently on room air  Use supplemental oxygen if needed to keep saturation above 92%    Chest x-ray shows bilateral patchy infiltrates suspicious for pneumonia or volume excess  Seems to be aspirating continue antibiotics  Culture sent  On antibiotics.     Patient has a acute right hip fracture  S/p surgery 12/30  Antibiotics for UTI    Continue with her medications  Continue medications for gout    DVT and GI prophylaxis  With her significant cognitive decline over last couple of days, family is contemplating hospice    Questions of patient were answered at bedside in detail  Case discussed in detail with RN, RT, and care team  Thank you for involving me in the care of this patient  I will follow with you closely during hospitalization    Time spent more than 30 minutes in direct patient care with no overlap    Ivey Lundborg, MD  [facility]  1/3/2023

## 2023-01-03 NOTE — DISCHARGE SUMMARY
Hospitalist Discharge Summary     Patient ID:    Remedios Payne  [id number]  49 y.o.  1/3/1934    Admit date: 12/29/2022    Discharge date : 1/3/2023      Final Diagnoses: Active Problems:    Hip fracture ([address] (12/29/2022)      UTI (urinary tract infection) (12/29/2022)        Reason for Hospitalization/Hospital Course:   She is an 80-year-old female with a history of atrial fibrillation, CHF, dementia, hypertension, thyroid disease and asthma who was brought to the ED on 12/29 after a ground-level fall. She developed immediate right sided hip pain. In the ED, x-ray demonstrated an acute comminuted displaced intertrochanteric right femur fracture with joint effusion. Patient has been seen by orthopedics 12/30 ORIF performed. She was been seen by pulmonology preoperatively. Her chest x-ray shows bilateral multifocal pneumonia. She was started on IV meropenem due to multiple allergies to antibiotics. Patient's mental status waxes and wanes. She is having poor progression toward goals. It is unlikely she will be able to participate well with rehab. PT and OT are recommending SNF placement for rehab. The son and daughter, would prefer she be placed back at her assisted living memory care facility in [place]. Son and daughter would like hospice to  care when she arrives back at her facility in [place]. Discharge Medications:   Current Discharge Medication List        CONTINUE these medications which have NOT CHANGED    Details   doxycycline (MONODOX) 100 mg capsule Take 100 mg by mouth two (2) times a day. 12/28/2022 for 10 days      albuterol (PROVENTIL VENTOLIN) 2.5 mg /3 mL (0.083 %) nebu 3 mL by Nebulization route every four (4) hours as needed for Wheezing, Shortness of Breath or Respiratory Distress. Qty: 60 Each, Refills: 0      metoprolol succinate (Toprol XL) 50 mg XL tablet Take 1 Tablet by mouth daily.   Qty: 30 Tablet, Refills: 0      d-mannose (AZO D-Mannose) 500 mg cap Take 2 Capsules by mouth daily. lisinopriL (PRINIVIL, ZESTRIL) 2.5 mg tablet Take 2.5 mg by mouth daily. ergocalciferol (ERGOCALCIFEROL) 1,250 mcg (50,000 unit) capsule Take 50,000 Units by mouth every seven (7) days. acetaminophen (TYLENOL) 500 mg tablet Take 1,000 mg by mouth every eight (8) hours as needed for Pain or Fever. loperamide (IMMODIUM) 2 mg tablet Take 2 mg by mouth daily as needed for Diarrhea. EPINEPHrine (EPIPEN) 0.3 mg/0.3 mL injection 0.3 mg by IntraMUSCular route once as needed for Allergic Response. furosemide (LASIX) 20 mg tablet Take  by mouth daily as needed for PRN Reason (Other) (edema). guaiFENesin ER (MUCINEX) 600 mg ER tablet Take 600 mg by mouth two (2) times daily as needed for Congestion. neomycin-bacitracin-polymyxin (Triple Antibiotic) 3.5mg-400 unit- 5,000 unit/gram ointment Apply  to affected area daily as needed. Any skin tear or scrape      zolpidem (AMBIEN) 5 mg tablet Take 5 mg by mouth nightly as needed for Sleep. Saccharomyces boulardii (FLORASTOR) 250 mg capsule Take 500 mg by mouth two (2) times a day. albuterol (PROVENTIL HFA, VENTOLIN HFA, PROAIR HFA) 90 mcg/actuation inhaler Take 1-2 Puffs by inhalation every four (4) hours as needed for Wheezing. Qty: 1 g, Refills: 0      allopurinoL (ZYLOPRIM) 300 mg tablet Take 300 mg by mouth daily. thyroid, Pork, (ARMOUR) 60 mg tablet Take 60 mg by mouth daily. cyanocobalamin 1,000 mcg tablet Take 2,000 mcg by mouth daily. montelukast (SINGULAIR) 10 mg tablet Take 10 mg by mouth daily. cetirizine (ZYRTEC) 10 mg tablet Take 10 mg by mouth nightly. Symbicort 160-4.5 mcg/actuation HFAA Take 2 Puffs by inhalation two (2) times a day. memantine (NAMENDA) 5 mg tablet TAKE 1 TABLET BY MOUTH EVERY DAY FOR 7 DAYS THEN 1 TABLET BY MOUTH TWICE DAILY      sertraline (ZOLOFT) 50 mg tablet Take 50 mg by mouth daily.                Follow up Care:    1. Cody Chinchilla MD in 1-2 weeks. Follow-up Information       Follow up With Specialties Details Why Aline Nascimento MD Internal Medicine Physician Follow up As needed [address]  [phone]                Patient Follow Up Instructions: Activity: Activity as tolerated  Diet:  Comfort feeding  Wound Care: None needed     Condition at Discharge:  Stable  __________________________________________________________________    Disposition  Home or Self Care  ____________________________________________________________________    Code Status:  DNR  ___________________________________________________________________    Discharge Exam:  Patient seen and examined by me on discharge day. Pertinent Findings:  Gen:    Not in distress  Chest: Clear lungs  CVS:   Regular rhythm. No edema  Abd:  Soft, not distended, not tender  Neuro:  Alert        CONSULTATIONS: Pulmonary/Intensive care and Orthopedic Surgery    Significant Diagnostic Studies:   Recent Results (from the past 24 hour(s))   CBC WITH AUTOMATED DIFF    Collection Time: 01/03/23 10:55 AM   Result Value Ref Range    WBC 7.9 3.6 - 11.0 K/uL    RBC 3.04 (L) 3.80 - 5.20 M/uL    HGB 9.0 (L) 11.5 - 16.0 g/dL    HCT 26.7 (L) 35.0 - 47.0 %    MCV 87.8 80.0 - 99.0 FL    MCH 29.6 26.0 - 34.0 PG    MCHC 33.7 30.0 - 36.5 g/dL    RDW 14.7 (H) 11.5 - 14.5 %    PLATELET 563 295 - 913 K/uL    MPV 9.9 8.9 - 12.9 FL    NRBC 0.0 0.0  WBC    ABSOLUTE NRBC 0.00 0.00 - 0.01 K/uL    NEUTROPHILS 76 (H) 32 - 75 %    LYMPHOCYTES 15 12 - 49 %    MONOCYTES 7 5 - 13 %    EOSINOPHILS 2 0 - 7 %    BASOPHILS 0 0 - 1 %    IMMATURE GRANULOCYTES 0 0 - 0.5 %    ABS. NEUTROPHILS 6.0 1.8 - 8.0 K/UL    ABS. LYMPHOCYTES 1.2 0.8 - 3.5 K/UL    ABS. MONOCYTES 0.6 0.0 - 1.0 K/UL    ABS. EOSINOPHILS 0.2 0.0 - 0.4 K/UL    ABS. BASOPHILS 0.0 0.0 - 0.1 K/UL    ABS. IMM.  GRANS. 0.0 0.00 - 0.04 K/UL    DF AUTOMATED     METABOLIC PANEL, COMPREHENSIVE    Collection Time: 01/03/23 10:55 AM   Result Value Ref Range    Sodium 131 (L) 136 - 145 mmol/L    Potassium 3.6 3.5 - 5.1 mmol/L    Chloride 97 97 - 108 mmol/L    CO2 29 21 - 32 mmol/L    Anion gap 5 5 - 15 mmol/L    Glucose 106 (H) 65 - 100 mg/dL    BUN 20 6 - 20 mg/dL    Creatinine 0.59 0.55 - 1.02 mg/dL    BUN/Creatinine ratio 34 (H) 12 - 20      eGFR >60 >60 ml/min/1.73m2    Calcium 8.4 (L) 8.5 - 10.1 mg/dL    Bilirubin, total 1.0 0.2 - 1.0 mg/dL    AST (SGOT) 35 15 - 37 U/L    ALT (SGPT) 12 12 - 78 U/L    Alk. phosphatase 68 45 - 117 U/L    Protein, total 5.3 (L) 6.4 - 8.2 g/dL    Albumin 2.2 (L) 3.5 - 5.0 g/dL    Globulin 3.1 2.0 - 4.0 g/dL    A-G Ratio 0.7 (L) 1.1 - 2.2       XR FLUOROSCOPY UNDER 60 MINUTES   Final Result   Right hip ORIF in progress.           Time spent in direct and indirect care including coordination of services: Greater than 35 minutes    Signed:  Nasir Toth NP  1/3/2023  2:19 PM

## 2023-01-03 NOTE — PROGRESS NOTES
Problem: Pressure Injury - Risk of  Goal: *Prevention of pressure injury  Description: Document Nirmal Scale and appropriate interventions in the flowsheet. Outcome: Progressing Towards Goal  Note: Pressure Injury Interventions:  Sensory Interventions: Keep linens dry and wrinkle-free, Maintain/enhance activity level, Minimize linen layers, Turn and reposition approx. every two hours (pillows and wedges if needed), Use 30-degree side-lying position    Moisture Interventions: Absorbent underpads, Internal/External urinary devices, Maintain skin hydration (lotion/cream), Minimize layers    Activity Interventions: Increase time out of bed, PT/OT evaluation    Mobility Interventions: HOB 30 degrees or less, Turn and reposition approx. every two hours(pillow and wedges), PT/OT evaluation    Nutrition Interventions: Document food/fluid/supplement intake    Friction and Shear Interventions: HOB 30 degrees or less, Minimize layers                Problem: Pain  Goal: *Control of Pain  Outcome: Progressing Towards Goal     Problem: Falls - Risk of  Goal: *Absence of Falls  Description: Document Shreya Fall Risk and appropriate interventions in the flowsheet.   Outcome: Progressing Towards Goal  Note: Fall Risk Interventions:  Mobility Interventions: Bed/chair exit alarm    Mentation Interventions: Adequate sleep, hydration, pain control, Door open when patient unattended, Family/sitter at bedside    Medication Interventions: Bed/chair exit alarm    Elimination Interventions: Bed/chair exit alarm, Call light in reach    History of Falls Interventions: Bed/chair exit alarm         Problem: Patient Education: Go to Patient Education Activity  Goal: Patient/Family Education  Outcome: Progressing Towards Goal     Problem: Patient Education: Go to Patient Education Activity  Goal: Patient/Family Education  Outcome: Progressing Towards Goal

## 2023-01-03 NOTE — ROUTINE PROCESS
Bedside shift change report given to [address] and Marisue Denver LPN (oncoming nurse) by [facility] RN (offgoing nurse). Report included the following information SBAR.

## 2023-01-03 NOTE — PROGRESS NOTES
Problem: Pressure Injury - Risk of  Goal: *Prevention of pressure injury  Description: Document Nirmal Scale and appropriate interventions in the flowsheet. Outcome: Progressing Towards Goal  Note: Pressure Injury Interventions:  Sensory Interventions: Keep linens dry and wrinkle-free, Maintain/enhance activity level, Minimize linen layers, Turn and reposition approx. every two hours (pillows and wedges if needed), Use 30-degree side-lying position    Moisture Interventions: Absorbent underpads, Internal/External urinary devices, Maintain skin hydration (lotion/cream), Minimize layers    Activity Interventions: Increase time out of bed, PT/OT evaluation    Mobility Interventions: HOB 30 degrees or less, Turn and reposition approx. every two hours(pillow and wedges), PT/OT evaluation    Nutrition Interventions: Document food/fluid/supplement intake    Friction and Shear Interventions: HOB 30 degrees or less, Minimize layers                Problem: Patient Education: Go to Patient Education Activity  Goal: Patient/Family Education  Outcome: Progressing Towards Goal     Problem: Pain  Goal: *Control of Pain  Outcome: Progressing Towards Goal     Problem: Patient Education: Go to Patient Education Activity  Goal: Patient/Family Education  Outcome: Progressing Towards Goal     Problem: Falls - Risk of  Goal: *Absence of Falls  Description: Document Shreya Fall Risk and appropriate interventions in the flowsheet.   Outcome: Progressing Towards Goal  Note: Fall Risk Interventions:  Mobility Interventions: Bed/chair exit alarm    Mentation Interventions: Adequate sleep, hydration, pain control, Door open when patient unattended, Family/sitter at bedside    Medication Interventions: Bed/chair exit alarm    Elimination Interventions: Bed/chair exit alarm, Call light in reach    History of Falls Interventions: Bed/chair exit alarm         Problem: Patient Education: Go to Patient Education Activity  Goal: Patient/Family Education  Outcome: Progressing Towards Goal     Problem: Falls - Risk of  Goal: *Absence of Falls  Description: Document Markus Avila Fall Risk and appropriate interventions in the flowsheet. Outcome: Progressing Towards Goal  Note: Fall Risk Interventions:  Mobility Interventions: Bed/chair exit alarm    Mentation Interventions: Adequate sleep, hydration, pain control, Door open when patient unattended, Family/sitter at bedside    Medication Interventions: Bed/chair exit alarm    Elimination Interventions: Bed/chair exit alarm, Call light in reach    History of Falls Interventions: Bed/chair exit alarm         Problem: Patient Education: Go to Patient Education Activity  Goal: Patient/Family Education  Outcome: Progressing Towards Goal     Problem: Patient Education: Go to Patient Education Activity  Goal: Patient/Family Education  Outcome: Progressing Towards Goal     Problem: Patient Education: Go to Patient Education Activity  Goal: Patient/Family Education  Outcome: Progressing Towards Goal     Problem: Patient Education: Go to Patient Education Activity  Goal: Patient/Family Education  Outcome: Progressing Towards Goal     Problem: Pain  Goal: *Control of Pain  Outcome: Progressing Towards Goal     Problem: Pain  Goal: *Control of Pain  Outcome: Progressing Towards Goal     Problem: Patient Education: Go to Patient Education Activity  Goal: Patient/Family Education  Outcome: Progressing Towards Goal     Problem: Falls - Risk of  Goal: *Absence of Falls  Description: Document Shreya Fall Risk and appropriate interventions in the flowsheet.   Outcome: Progressing Towards Goal  Note: Fall Risk Interventions:  Mobility Interventions: Bed/chair exit alarm    Mentation Interventions: Adequate sleep, hydration, pain control, Door open when patient unattended, Family/sitter at bedside    Medication Interventions: Bed/chair exit alarm    Elimination Interventions: Bed/chair exit alarm, Call light in reach    History of Falls Interventions: Bed/chair exit alarm

## 2023-01-03 NOTE — PROGRESS NOTES
Hospitalist Progress Note            Daily Progress Note: 1/3/2023 8:58 AM  Hospital course:     She is an 24-year-old female with a history of atrial fibrillation, CHF, dementia, hypertension, thyroid disease and asthma who was brought to the ED on 12/29 after a ground-level fall. She developed immediate right sided hip pain. In the ED, x-ray demonstrated an acute comminuted displaced intertrochanteric right femur fracture with joint effusion. Patient has been seen by orthopedics 12/30 ORIF performed. She was been seen by pulmonology preoperatively. Her chest x-ray shows bilateral multifocal pneumonia. She was started on IV meropenem due to multiple allergies to antibiotics. PT and OT are recommending SNF placement for rehab. The son and POA would prefer she be placed somewhere in [place] closer to where they live. Subjective: Follow-up examination of patient at the bedside. She is pleasantly confused. She has a cough that is congested. She remains weak and frail. Did not have a good night last night due to treatment for constipation led to multiple loose stools and insomnia which worsened her baseline confusion. Discussed plan of care at length with daughter at the bedside. She  would prefer her to be placed in skilled nursing facility near to where they live in [place]. They would like both son and daughter for patient to be referred to hospice once transferred to SNF.     Assessment/Plan:   Active Problems:    Hip fracture ([address] (12/29/2022)      UTI (urinary tract infection) (12/29/2022)    Acute right hip fracture  Taken to the operating room and repair with open reduction internal fixation by Dr. Radha Tavera  PT OT consult  Most likely require rehab and discussed with family at the bedside  Increased as of the right leg may require CT if worsening  Repeat CBC    Multifocal community-acquired pneumonia  Continue meropenem due to allergies  Repeat chest x-ray    Dementia  Continue [place]     Essential hypertension  Continue lisinopril  Continue metoprolol    Paroxysmal atrial fibrillation  Continue metoprolol     Hypothyroidism  Continue [place]        DVT Prophylaxis:  Code Status: DNR  POA/NOK:    Disposition and discharge barriers:   Pt/ot rec SNF with hospice  Care Plan discussed with:     Current Facility-Administered Medications   Medication Dose Route Frequency    sodium chloride (NS) flush 5-40 mL  5-40 mL IntraVENous Q8H    sodium chloride (NS) flush 5-40 mL  5-40 mL IntraVENous PRN    aspirin delayed-release tablet 325 mg  325 mg Oral BID    celecoxib (CELEBREX) capsule 200 mg  200 mg Oral DAILY    meropenem (MERREM) 1 g in 0.9% sodium chloride (MBP/ADV) 50 mL MBP  1 g IntraVENous Q8H    albuterol CONCENTRATE 2.5mg/0.5 mL neb soln  2.5 mg Nebulization Q4H PRN    allopurinoL (ZYLOPRIM) tablet 300 mg  300 mg Oral DAILY    cyanocobalamin (VITAMIN B12) tablet 2,000 mcg  2,000 mcg Oral DAILY    ergocalciferol capsule 50,000 Units  50,000 Units Oral Q7D    furosemide (LASIX) tablet 20 mg  20 mg Oral DAILY    lisinopriL (PRINIVIL, ZESTRIL) tablet 2.5 mg  2.5 mg Oral DAILY    memantine (NAMENDA) tablet 5 mg  5 mg Oral DAILY    metoprolol succinate (TOPROL-XL) XL tablet 50 mg  50 mg Oral DAILY    montelukast (SINGULAIR) tablet 10 mg  10 mg Oral DAILY    acidophilus-pectin, citrus tablet 1 Tablet  1 Tablet Oral BID    sertraline (ZOLOFT) tablet 50 mg  50 mg Oral DAILY    budesonide-formoteroL (SYMBICORT) 160-4.5 mcg/actuation HFA inhaler 2 Puff  2 Puff Inhalation BID    thyroid (Pork) (ARMOUR) tablet 60 mg  60 mg Oral DAILY    sodium chloride (NS) flush 5-40 mL  5-40 mL IntraVENous Q8H    sodium chloride (NS) flush 5-40 mL  5-40 mL IntraVENous PRN    acetaminophen (TYLENOL) tablet 1,000 mg  1,000 mg Oral Q6H        REVIEW OF SYSTEMS    Review of Systems   Constitutional:  Positive for malaise/fatigue. HENT:  Positive for congestion.     Respiratory:  Positive for cough, sputum production and wheezing. Cardiovascular:  Negative for leg swelling. Musculoskeletal:  Positive for myalgias. Neurological:  Positive for weakness. Psychiatric/Behavioral:  Positive for memory loss. The patient is nervous/anxious. Objective:     Visit Vitals  BP (!) 142/65 (BP 1 Location: Right upper arm, BP Patient Position: At rest;Supine)   Pulse 78   Temp 97.5 °F (36.4 °C)   Resp 18   Wt 71.4 kg (157 lb 6.5 oz)   SpO2 93%   BMI 22.27 kg/m²    O2 Flow Rate (L/min): 3 l/min O2 Device: None (Room air)    Temp (24hrs), Av.7 °F (36.5 °C), Min:97.5 °F (36.4 °C), Max:98 °F (36.7 °C)        PHYSICAL EXAM:    Physical Exam  Constitutional:       Appearance: She is ill-appearing. HENT:      Nose: Congestion present. Cardiovascular:      Rate and Rhythm: Normal rate and regular rhythm. Pulmonary:      Effort: No respiratory distress. Breath sounds: Rales present. Abdominal:      General: There is no distension. Tenderness: There is no abdominal tenderness. Musculoskeletal:      Right lower leg: Edema present. Left lower leg: Edema present. Data Review    Recent Results (from the past 24 hour(s))   CBC WITH AUTOMATED DIFF    Collection Time: 23  6:57 AM   Result Value Ref Range    WBC 9.6 3.6 - 11.0 K/uL    RBC 2.54 (L) 3.80 - 5.20 M/uL    HGB 7.4 (L) 11.5 - 16.0 g/dL    HCT 22.6 (L) 35.0 - 47.0 %    MCV 89.0 80.0 - 99.0 FL    MCH 29.1 26.0 - 34.0 PG    MCHC 32.7 30.0 - 36.5 g/dL    RDW 14.6 (H) 11.5 - 14.5 %    PLATELET 774 (L) 995 - 400 K/uL    MPV 10.6 8.9 - 12.9 FL    NRBC 0.0 0.0  WBC    ABSOLUTE NRBC 0.00 0.00 - 0.01 K/uL    NEUTROPHILS 81 (H) 32 - 75 %    LYMPHOCYTES 10 (L) 12 - 49 %    MONOCYTES 6 5 - 13 %    EOSINOPHILS 2 0 - 7 %    BASOPHILS 0 0 - 1 %    IMMATURE GRANULOCYTES 1 (H) 0 - 0.5 %    ABS. NEUTROPHILS 7.9 1.8 - 8.0 K/UL    ABS. LYMPHOCYTES 1.0 0.8 - 3.5 K/UL    ABS. MONOCYTES 0.5 0.0 - 1.0 K/UL    ABS.  EOSINOPHILS 0.1 0.0 - 0.4 K/UL    ABS. BASOPHILS 0.0 0.0 - 0.1 K/UL    ABS. IMM. GRANS. 0.1 (H) 0.00 - 0.04 K/UL    DF AUTOMATED     METABOLIC PANEL, COMPREHENSIVE    Collection Time: 01/02/23  6:57 AM   Result Value Ref Range    Sodium 131 (L) 136 - 145 mmol/L    Potassium 4.1 3.5 - 5.1 mmol/L    Chloride 99 97 - 108 mmol/L    CO2 27 21 - 32 mmol/L    Anion gap 5 5 - 15 mmol/L    Glucose 113 (H) 65 - 100 mg/dL    BUN 28 (H) 6 - 20 mg/dL    Creatinine 0.76 0.55 - 1.02 mg/dL    BUN/Creatinine ratio 37 (H) 12 - 20      eGFR >60 >60 ml/min/1.73m2    Calcium 8.1 (L) 8.5 - 10.1 mg/dL    Bilirubin, total 0.7 0.2 - 1.0 mg/dL    AST (SGOT) 41 (H) 15 - 37 U/L    ALT (SGPT) 13 12 - 78 U/L    Alk. phosphatase 57 45 - 117 U/L    Protein, total 4.8 (L) 6.4 - 8.2 g/dL    Albumin 2.2 (L) 3.5 - 5.0 g/dL    Globulin 2.6 2.0 - 4.0 g/dL    A-G Ratio 0.8 (L) 1.1 - 2.2         XR FLUOROSCOPY UNDER 60 MINUTES   Final Result   Right hip ORIF in progress. Intake and Output:  Current Shift: 01/02 1901 - 01/03 0700  In: 500 [P.O.:350; I.V.:150]  Out: 1000 [Urine:1000]  Last three shifts: 01/01 0701 - 01/02 1900  In: 2333 [P.O.:1040; I.V.:150]  Out: 1850 [Urine:1850]      Lab/Data Review:  Recent Labs     01/02/23  0657 01/01/23 0740 12/31/22 0928   WBC 9.6 10.4  --    HGB 7.4* 7.7*  7.7* 9.9*   HCT 22.6* 23.0*  --    * 107*  --        Recent Labs     01/02/23  0657 01/01/23  0740 12/31/22  0928   * 134* 135*   K 4.1 3.8 4.7   CL 99 102 103   CO2 27 26 26   * 109* 122*   BUN 28* 36* 27*   CREA 0.76 1.14* 1.02   CA 8.1* 8.0* 8.4*   ALB 2.2* 2.1*  --    TBILI 0.7 0.5  --    ALT 13 13  --        No results for input(s): PH, PCO2, PO2, HCO3, FIO2 in the last 72 hours.   Recent Results (from the past 24 hour(s))   CBC WITH AUTOMATED DIFF    Collection Time: 01/02/23  6:57 AM   Result Value Ref Range    WBC 9.6 3.6 - 11.0 K/uL    RBC 2.54 (L) 3.80 - 5.20 M/uL    HGB 7.4 (L) 11.5 - 16.0 g/dL    HCT 22.6 (L) 35.0 - 47.0 %    MCV 89.0 80.0 - 99.0 FL    MCH 29.1 26.0 - 34.0 PG    MCHC 32.7 30.0 - 36.5 g/dL    RDW 14.6 (H) 11.5 - 14.5 %    PLATELET 728 (L) 091 - 400 K/uL    MPV 10.6 8.9 - 12.9 FL    NRBC 0.0 0.0  WBC    ABSOLUTE NRBC 0.00 0.00 - 0.01 K/uL    NEUTROPHILS 81 (H) 32 - 75 %    LYMPHOCYTES 10 (L) 12 - 49 %    MONOCYTES 6 5 - 13 %    EOSINOPHILS 2 0 - 7 %    BASOPHILS 0 0 - 1 %    IMMATURE GRANULOCYTES 1 (H) 0 - 0.5 %    ABS. NEUTROPHILS 7.9 1.8 - 8.0 K/UL    ABS. LYMPHOCYTES 1.0 0.8 - 3.5 K/UL    ABS. MONOCYTES 0.5 0.0 - 1.0 K/UL    ABS. EOSINOPHILS 0.1 0.0 - 0.4 K/UL    ABS. BASOPHILS 0.0 0.0 - 0.1 K/UL    ABS. IMM. GRANS. 0.1 (H) 0.00 - 0.04 K/UL    DF AUTOMATED     METABOLIC PANEL, COMPREHENSIVE    Collection Time: 01/02/23  6:57 AM   Result Value Ref Range    Sodium 131 (L) 136 - 145 mmol/L    Potassium 4.1 3.5 - 5.1 mmol/L    Chloride 99 97 - 108 mmol/L    CO2 27 21 - 32 mmol/L    Anion gap 5 5 - 15 mmol/L    Glucose 113 (H) 65 - 100 mg/dL    BUN 28 (H) 6 - 20 mg/dL    Creatinine 0.76 0.55 - 1.02 mg/dL    BUN/Creatinine ratio 37 (H) 12 - 20      eGFR >60 >60 ml/min/1.73m2    Calcium 8.1 (L) 8.5 - 10.1 mg/dL    Bilirubin, total 0.7 0.2 - 1.0 mg/dL    AST (SGOT) 41 (H) 15 - 37 U/L    ALT (SGPT) 13 12 - 78 U/L    Alk. phosphatase 57 45 - 117 U/L    Protein, total 4.8 (L) 6.4 - 8.2 g/dL    Albumin 2.2 (L) 3.5 - 5.0 g/dL    Globulin 2.6 2.0 - 4.0 g/dL    A-G Ratio 0.8 (L) 1.1 - 2.2               _____________________________________________________________________________  Time spent in direct care including coordination of service, review of data and examination: > 35 minutes    ______________________________________________________________________________    Jose Ramon Ford NP    This is dictation was done by dragon, computer voice recognition software. Quite often unanticipated grammatical, syntax, homophones and other interpretive errors or inadvertently transcribed by the computer software.   Please excuse errors that have escaped final proofreading. Thank you.

## 2023-01-04 VITALS
HEART RATE: 69 BPM | SYSTOLIC BLOOD PRESSURE: 122 MMHG | TEMPERATURE: 97.6 F | DIASTOLIC BLOOD PRESSURE: 72 MMHG | WEIGHT: 160.94 LBS | OXYGEN SATURATION: 95 % | RESPIRATION RATE: 16 BRPM | BODY MASS INDEX: 22.77 KG/M2

## 2023-01-04 LAB
ALBUMIN SERPL-MCNC: 2.1 G/DL (ref 3.5–5)
ALBUMIN/GLOB SERPL: 0.7 {RATIO} (ref 1.1–2.2)
ALP SERPL-CCNC: 71 U/L (ref 45–117)
ALT SERPL-CCNC: 13 U/L (ref 12–78)
ANION GAP SERPL CALC-SCNC: 4 MMOL/L (ref 5–15)
AST SERPL W P-5'-P-CCNC: 29 U/L (ref 15–37)
BASOPHILS # BLD: 0 K/UL (ref 0–0.1)
BASOPHILS NFR BLD: 0 % (ref 0–1)
BILIRUB SERPL-MCNC: 0.9 MG/DL (ref 0.2–1)
BUN SERPL-MCNC: 16 MG/DL (ref 6–20)
BUN/CREAT SERPL: 32 (ref 12–20)
CA-I BLD-MCNC: 8.3 MG/DL (ref 8.5–10.1)
CHLORIDE SERPL-SCNC: 95 MMOL/L (ref 97–108)
CO2 SERPL-SCNC: 30 MMOL/L (ref 21–32)
CREAT SERPL-MCNC: 0.5 MG/DL (ref 0.55–1.02)
DIFFERENTIAL METHOD BLD: ABNORMAL
EOSINOPHIL # BLD: 0.2 K/UL (ref 0–0.4)
EOSINOPHIL NFR BLD: 2 % (ref 0–7)
ERYTHROCYTE [DISTWIDTH] IN BLOOD BY AUTOMATED COUNT: 14.6 % (ref 11.5–14.5)
GLOBULIN SER CALC-MCNC: 3 G/DL (ref 2–4)
GLUCOSE SERPL-MCNC: 104 MG/DL (ref 65–100)
HCT VFR BLD AUTO: 25.5 % (ref 35–47)
HGB BLD-MCNC: 9 G/DL (ref 11.5–16)
IMM GRANULOCYTES # BLD AUTO: 0 K/UL (ref 0–0.04)
IMM GRANULOCYTES NFR BLD AUTO: 1 % (ref 0–0.5)
LYMPHOCYTES # BLD: 1.1 K/UL (ref 0.8–3.5)
LYMPHOCYTES NFR BLD: 15 % (ref 12–49)
MCH RBC QN AUTO: 31.6 PG (ref 26–34)
MCHC RBC AUTO-ENTMCNC: 35.3 G/DL (ref 30–36.5)
MCV RBC AUTO: 89.5 FL (ref 80–99)
MONOCYTES # BLD: 0.6 K/UL (ref 0–1)
MONOCYTES NFR BLD: 7 % (ref 5–13)
NEUTS SEG # BLD: 5.9 K/UL (ref 1.8–8)
NEUTS SEG NFR BLD: 75 % (ref 32–75)
NRBC # BLD: 0 K/UL (ref 0–0.01)
NRBC BLD-RTO: 0 PER 100 WBC
PLATELET # BLD AUTO: 187 K/UL (ref 150–400)
PMV BLD AUTO: 10 FL (ref 8.9–12.9)
POTASSIUM SERPL-SCNC: 3.5 MMOL/L (ref 3.5–5.1)
PROT SERPL-MCNC: 5.1 G/DL (ref 6.4–8.2)
RBC # BLD AUTO: 2.85 M/UL (ref 3.8–5.2)
SODIUM SERPL-SCNC: 129 MMOL/L (ref 136–145)
WBC # BLD AUTO: 7.9 K/UL (ref 3.6–11)

## 2023-01-04 PROCEDURE — 74011000258 HC RX REV CODE- 258: Performed by: ORTHOPAEDIC SURGERY

## 2023-01-04 PROCEDURE — 36415 COLL VENOUS BLD VENIPUNCTURE: CPT

## 2023-01-04 PROCEDURE — 80053 COMPREHEN METABOLIC PANEL: CPT

## 2023-01-04 PROCEDURE — 74011250636 HC RX REV CODE- 250/636: Performed by: ORTHOPAEDIC SURGERY

## 2023-01-04 PROCEDURE — 74011000250 HC RX REV CODE- 250: Performed by: ORTHOPAEDIC SURGERY

## 2023-01-04 PROCEDURE — 74011250637 HC RX REV CODE- 250/637: Performed by: ORTHOPAEDIC SURGERY

## 2023-01-04 PROCEDURE — 94640 AIRWAY INHALATION TREATMENT: CPT

## 2023-01-04 PROCEDURE — 85025 COMPLETE CBC W/AUTO DIFF WBC: CPT

## 2023-01-04 PROCEDURE — 97530 THERAPEUTIC ACTIVITIES: CPT

## 2023-01-04 RX ADMIN — MEMANTINE HYDROCHLORIDE 5 MG: 10 TABLET ORAL at 09:22

## 2023-01-04 RX ADMIN — Medication 1 TABLET: at 09:22

## 2023-01-04 RX ADMIN — CYANOCOBALAMIN TAB 500 MCG 2000 MCG: 500 TAB at 09:22

## 2023-01-04 RX ADMIN — FUROSEMIDE 20 MG: 40 TABLET ORAL at 09:23

## 2023-01-04 RX ADMIN — SODIUM CHLORIDE, PRESERVATIVE FREE 10 ML: 5 INJECTION INTRAVENOUS at 05:12

## 2023-01-04 RX ADMIN — ALLOPURINOL 300 MG: 100 TABLET ORAL at 09:23

## 2023-01-04 RX ADMIN — BUDESONIDE AND FORMOTEROL FUMARATE DIHYDRATE 2 PUFF: 160; 4.5 AEROSOL RESPIRATORY (INHALATION) at 07:42

## 2023-01-04 RX ADMIN — METOPROLOL SUCCINATE 50 MG: 50 TABLET, EXTENDED RELEASE ORAL at 09:23

## 2023-01-04 RX ADMIN — SERTRALINE HYDROCHLORIDE 50 MG: 50 TABLET ORAL at 09:23

## 2023-01-04 RX ADMIN — LEVOTHYROXINE, LIOTHYRONINE 60 MG: 9.5; 2.25 TABLET ORAL at 09:00

## 2023-01-04 RX ADMIN — ASPIRIN 325 MG: 325 TABLET, COATED ORAL at 09:22

## 2023-01-04 RX ADMIN — MEROPENEM 1 G: 1 INJECTION, POWDER, FOR SOLUTION INTRAVENOUS at 05:37

## 2023-01-04 RX ADMIN — ALBUTEROL SULFATE 2.5 MG: 2.5 SOLUTION RESPIRATORY (INHALATION) at 12:53

## 2023-01-04 RX ADMIN — LISINOPRIL 2.5 MG: 5 TABLET ORAL at 09:22

## 2023-01-04 RX ADMIN — CELECOXIB 200 MG: 200 CAPSULE ORAL at 09:23

## 2023-01-04 RX ADMIN — MONTELUKAST 10 MG: 10 TABLET, FILM COATED ORAL at 09:23

## 2023-01-04 RX ADMIN — ACETAMINOPHEN 1000 MG: 500 TABLET ORAL at 09:23

## 2023-01-04 NOTE — PROGRESS NOTES
1200: New order rec'd for patient discharge to [facility]. Discharge paperwork reviewed and provided to patient's family;  questions asked and answered. Handoff report called to Salinas Hedrick RN, at [facility]; questions asked and answered. 1400: [facility] transport at bedside for patient transfer to facility. Handoff report and all necessary paperwork provided. Discharge education with transport team; all personal belongings with patient's family. Discharge completed without incident.

## 2023-01-04 NOTE — PROGRESS NOTES
Problem: Pressure Injury - Risk of  Goal: *Prevention of pressure injury  Description: Document Nirmal Scale and appropriate interventions in the flowsheet. Outcome: Progressing Towards Goal  Note: Pressure Injury Interventions:  Sensory Interventions: Assess changes in LOC, Discuss PT/OT consult with provider, Keep linens dry and wrinkle-free, Maintain/enhance activity level, Minimize linen layers, Use 30-degree side-lying position, Turn and reposition approx. every two hours (pillows and wedges if needed)    Moisture Interventions: Absorbent underpads, Apply protective barrier, creams and emollients, Internal/External urinary devices, Minimize layers, Maintain skin hydration (lotion/cream)    Activity Interventions: PT/OT evaluation, Increase time out of bed    Mobility Interventions: PT/OT evaluation, HOB 30 degrees or less, Turn and reposition approx. every two hours(pillow and wedges)    Nutrition Interventions: Document food/fluid/supplement intake    Friction and Shear Interventions: HOB 30 degrees or less, Minimize layers                Problem: Falls - Risk of  Goal: *Absence of Falls  Description: Document Shreya Fall Risk and appropriate interventions in the flowsheet.   Outcome: Progressing Towards Goal  Note: Fall Risk Interventions:  Mobility Interventions: Patient to call before getting OOB, PT Consult for mobility concerns    Mentation Interventions: Bed/chair exit alarm, Door open when patient unattended, Gait belt with transfers/ambulation    Medication Interventions: Patient to call before getting OOB, Teach patient to arise slowly    Elimination Interventions: Call light in reach, Patient to call for help with toileting needs, Toileting schedule/hourly rounds    History of Falls Interventions: Bed/chair exit alarm         Problem: Patient Education: Go to Patient Education Activity  Goal: Patient/Family Education  Outcome: Progressing Towards Goal     Problem: Patient Education: Go to Patient Education Activity  Goal: Patient/Family Education  Outcome: Progressing Towards Goal

## 2023-01-04 NOTE — PROGRESS NOTES
Patient discharging to [facility] today, going on hospice with [facility]. Family is aware and in agreement. Transport setup for  at 1pm/1300. Discharge plan of care/case management plan validated with provider discharge order.

## 2023-01-04 NOTE — PROGRESS NOTES
Care plan reviewed. Problem: Pressure Injury - Risk of  Goal: *Prevention of pressure injury  Description: Document Nirmal Scale and appropriate interventions in the flowsheet. Outcome: Progressing Towards Goal  Note: Pressure Injury Interventions:  Sensory Interventions: Assess changes in LOC, Discuss PT/OT consult with provider, Keep linens dry and wrinkle-free, Maintain/enhance activity level, Minimize linen layers, Use 30-degree side-lying position, Turn and reposition approx. every two hours (pillows and wedges if needed)    Moisture Interventions: Absorbent underpads, Apply protective barrier, creams and emollients, Internal/External urinary devices, Minimize layers, Maintain skin hydration (lotion/cream)    Activity Interventions: PT/OT evaluation, Increase time out of bed    Mobility Interventions: PT/OT evaluation, HOB 30 degrees or less, Turn and reposition approx.  every two hours(pillow and wedges)    Nutrition Interventions: Document food/fluid/supplement intake    Friction and Shear Interventions: HOB 30 degrees or less, Minimize layers                Problem: Patient Education: Go to Patient Education Activity  Goal: Patient/Family Education  Outcome: Progressing Towards Goal     Problem: Pain  Goal: *Control of Pain  Outcome: Progressing Towards Goal     Problem: Patient Education: Go to Patient Education Activity  Goal: Patient/Family Education  Outcome: Progressing Towards Goal

## 2023-01-04 NOTE — PROGRESS NOTES
Hypotension noted and notified covering hospitalist Moris Pandya, ordered for NS 500ml bolus now and recheck after, if still low can give another 500ml of NS.

## 2023-01-04 NOTE — PROGRESS NOTES
OCCUPATIONAL THERAPY TREATMENT  Patient: Aditya Little (70 y.o. female)  Date: 1/4/2023  Diagnosis: Hip fracture ([facility] [address] [S72.009A]  UTI (urinary tract infection) [N39.0] <principal problem not specified>  Procedure(s) (LRB):  RIGHT FEMUR GAMMA NAIL INSERTION (Right) 5 Days Post-Op  Precautions:    Chart, occupational therapy assessment, plan of care, and goals were reviewed. ASSESSMENT  Pt continues with skilled OT/PT services and is progressing towards goals. Pt received semi-supine in bed upon arrival, AXO x4 and agreeable to DOMINGUEZ/ PTA tx at this time. Overall, pt continues to present with deficits in generalized strength/AROM, coordination, bed mobility, static/dynamic sitting and standing balance and functional activity tolerance during performance of ADLs/mobility (see below for objective details and assist levels). Pt tolerated session fair, required freq rest breaks. Periods of sob noted through our session. Education provided on pursed lip breathing and taking rest breaks (energy conservation). Pt would benefit from further practice on both techniques. Pt placed on bedpan when sitting eob at pt's request. Pt unable to have bm or void bladder. Pt completed 3 standing for 30 sec to 1 minute per stand w/ RW, gait belt and mod A x2. Pt unable to take any steps. Pt very pleasant and cooperative during session and would benefit from further OT intervention. Will continue to progress. Recommend d/c to [facility] once medically appropriate. Other factors to consider for discharge: PLOF, time since onset, severity of deficits, and decline from functional baseline. PLAN :  Patient continues to benefit from skilled intervention to address the above impairments. Continue treatment per established plan of care. to address goals.     Recommend with staff: Encourage HEP in prep for ADLs/mobility    Recommend next session: Seated grooming    Recommendation for discharge: (in order for the patient to meet his/her long term goals)  Ruddy Frey    This discharge recommendation:  Has been made in collaboration with the attending provider and/or case management    IF patient discharges home will need the following DME: TBD at next facility       SUBJECTIVE:   Patient stated [birth year]enny Foy was my [birth year].     OBJECTIVE DATA SUMMARY:   Cognitive/Behavioral Status:  Neurologic State: Alert  Orientation Level: Oriented X4  Cognition: Follows commands      Functional Mobility and Transfers for ADLs:  Bed Mobility:  Supine to Sit: Moderate assistance;Assist x2;Minimum assistance  Sit to Supine: Moderate assistance;Assist x2  Scooting: Minimum assistance    Transfers:  Sit to Stand: Moderate assistance;Assist x2    Balance:  Sitting: Impaired; With support  Sitting - Static: Fair (occasional)  Sitting - Dynamic: Fair (occasional)  Standing: Impaired; With support  Standing - Static: Fair;Constant support  Standing - Dynamic : Fair;Constant support    ADL Intervention:    Toileting  Bladder Hygiene: Total assistance (dependent)      Pain:  8/10 pre-mobility  3/10 end of session    Activity Tolerance:   Fair and requires frequent rest breaks  After treatment patient left in no apparent distress:   Supine in bed, Call bell within reach, Bed / chair alarm activated, Caregiver / family present, and Side rails x 3, bed locked and in lowest position    COMMUNICATION/COLLABORATION:   The patients plan of care was discussed with: Physical therapy assistant and Registered nurse. PT/OT sessions occurred together for increased safety of pt and clinician. GALILEO Wade  Time Calculation: 40 mins     Problem: Self Care Deficits Care Plan (Adult)  Goal: *Acute Goals and Plan of Care (Insert Text)  Description: FUNCTIONAL STATUS PRIOR TO ADMISSION: pt/family reports she was using rollator for ambulation and staff A w/ ADLs. Family reports 2 falls within the last 3 months.       HOME SUPPORT: Pt lives at [facility] in memory care unit.     Occupational Therapy Goals  Initiated 12/31/2022    Pt stated goal I want to get better  Pt will be IND sup <> sit in prep for EOB ADLs  Pt will be mod I grooming standing sink side LRAD  Pt will be min A UB dressing sitting EOB/long sit   Pt will be min A LE dressing sitting EOB/long sit  Pt will be Mod I sit <>  prep for toileting LRAD  Pt will be Mod I toileting/toilet transfer/cloth mgmt LRAD  Pt will be IND following UE HEP in prep for self care tasks   Outcome: Progressing Towards Goal

## 2023-01-04 NOTE — ROUTINE PROCESS
Bedside shift change report given to Jolanta (oncoming nurse) by Shanta Little RN (offgoing nurse). Report included the following information SBAR.

## 2023-01-04 NOTE — DISCHARGE SUMMARY
Hospitalist Discharge Summary     Patient ID:    Caryle Locket  [id number]  25 y.o.  1/3/1934    Admit date: 12/29/2022    Discharge date : 1/4/2023      Final Diagnoses: Active Problems:    Hip fracture ([address] (12/29/2022)      UTI (urinary tract infection) (12/29/2022)      Reason for Hospitalization/Hospital Course:   She is an 80-year-old female with a history of atrial fibrillation, CHF, dementia, hypertension, thyroid disease and asthma who was brought to the ED on 12/29 after a ground-level fall. She developed immediate right sided hip pain. In the ED, x-ray demonstrated an acute comminuted displaced intertrochanteric right femur fracture with joint effusion. Patient has been seen by orthopedics 12/30 ORIF performed. She was been seen by pulmonology preoperatively. Her chest x-ray shows bilateral multifocal pneumonia. She was started on IV meropenem due to multiple allergies to antibiotics. Patient's mental status waxes and wanes. She is having poor progression toward goals. It is unlikely she will be able to participate well with rehab. PT and OT are recommending SNF placement for rehab. The son and daughter, would prefer she be placed back at her assisted living memory care facility in [place]. Son and daughter would like hospice to  care when she arrives back at her facility in [place]. Discharge Medications:   Current Discharge Medication List        CONTINUE these medications which have NOT CHANGED    Details   doxycycline (MONODOX) 100 mg capsule Take 100 mg by mouth two (2) times a day. 12/28/2022 for 10 days      albuterol (PROVENTIL VENTOLIN) 2.5 mg /3 mL (0.083 %) nebu 3 mL by Nebulization route every four (4) hours as needed for Wheezing, Shortness of Breath or Respiratory Distress. Qty: 60 Each, Refills: 0      metoprolol succinate (Toprol XL) 50 mg XL tablet Take 1 Tablet by mouth daily.   Qty: 30 Tablet, Refills: 0      d-mannose (AZO D-Mannose) 500 mg cap Take 2 Capsules by mouth daily. lisinopriL (PRINIVIL, ZESTRIL) 2.5 mg tablet Take 2.5 mg by mouth daily. ergocalciferol (ERGOCALCIFEROL) 1,250 mcg (50,000 unit) capsule Take 50,000 Units by mouth every seven (7) days. acetaminophen (TYLENOL) 500 mg tablet Take 1,000 mg by mouth every eight (8) hours as needed for Pain or Fever. loperamide (IMMODIUM) 2 mg tablet Take 2 mg by mouth daily as needed for Diarrhea. EPINEPHrine (EPIPEN) 0.3 mg/0.3 mL injection 0.3 mg by IntraMUSCular route once as needed for Allergic Response. furosemide (LASIX) 20 mg tablet Take  by mouth daily as needed for PRN Reason (Other) (edema). guaiFENesin ER (MUCINEX) 600 mg ER tablet Take 600 mg by mouth two (2) times daily as needed for Congestion. neomycin-bacitracin-polymyxin (Triple Antibiotic) 3.5mg-400 unit- 5,000 unit/gram ointment Apply  to affected area daily as needed. Any skin tear or scrape      zolpidem (AMBIEN) 5 mg tablet Take 5 mg by mouth nightly as needed for Sleep. Saccharomyces boulardii (FLORASTOR) 250 mg capsule Take 500 mg by mouth two (2) times a day. albuterol (PROVENTIL HFA, VENTOLIN HFA, PROAIR HFA) 90 mcg/actuation inhaler Take 1-2 Puffs by inhalation every four (4) hours as needed for Wheezing. Qty: 1 g, Refills: 0      allopurinoL (ZYLOPRIM) 300 mg tablet Take 300 mg by mouth daily. thyroid, Pork, (ARMOUR) 60 mg tablet Take 60 mg by mouth daily. cyanocobalamin 1,000 mcg tablet Take 2,000 mcg by mouth daily. montelukast (SINGULAIR) 10 mg tablet Take 10 mg by mouth daily. cetirizine (ZYRTEC) 10 mg tablet Take 10 mg by mouth nightly. Symbicort 160-4.5 mcg/actuation HFAA Take 2 Puffs by inhalation two (2) times a day. memantine (NAMENDA) 5 mg tablet TAKE 1 TABLET BY MOUTH EVERY DAY FOR 7 DAYS THEN 1 TABLET BY MOUTH TWICE DAILY      sertraline (ZOLOFT) 50 mg tablet Take 50 mg by mouth daily.                Follow up Care:    1. Nelson Harris MD in 1-2 weeks. Follow-up Information       Follow up With Specialties Details Why Beryle Dear, MD Internal Medicine Physician Follow up As needed [address]  [phone]                Patient Follow Up Instructions: Activity: Activity as tolerated  Diet:  Comfort feeding  Wound Care: None needed     Condition at Discharge:  Stable  __________________________________________________________________    Disposition  Home Hospice  ____________________________________________________________________    Code Status:  DNR  ___________________________________________________________________    Discharge Exam:  Patient seen and examined by me on discharge day. Pertinent Findings:  Gen:    Not in distress  Chest: Clear lungs  CVS:   Regular rhythm. No edema  Abd:  Soft, not distended, not tender  Neuro:  Alert        CONSULTATIONS: Pulmonary/Intensive care and Orthopedic Surgery    Significant Diagnostic Studies:   Recent Results (from the past 24 hour(s))   CBC WITH AUTOMATED DIFF    Collection Time: 01/03/23 10:55 AM   Result Value Ref Range    WBC 7.9 3.6 - 11.0 K/uL    RBC 3.04 (L) 3.80 - 5.20 M/uL    HGB 9.0 (L) 11.5 - 16.0 g/dL    HCT 26.7 (L) 35.0 - 47.0 %    MCV 87.8 80.0 - 99.0 FL    MCH 29.6 26.0 - 34.0 PG    MCHC 33.7 30.0 - 36.5 g/dL    RDW 14.7 (H) 11.5 - 14.5 %    PLATELET 305 743 - 541 K/uL    MPV 9.9 8.9 - 12.9 FL    NRBC 0.0 0.0  WBC    ABSOLUTE NRBC 0.00 0.00 - 0.01 K/uL    NEUTROPHILS 76 (H) 32 - 75 %    LYMPHOCYTES 15 12 - 49 %    MONOCYTES 7 5 - 13 %    EOSINOPHILS 2 0 - 7 %    BASOPHILS 0 0 - 1 %    IMMATURE GRANULOCYTES 0 0 - 0.5 %    ABS. NEUTROPHILS 6.0 1.8 - 8.0 K/UL    ABS. LYMPHOCYTES 1.2 0.8 - 3.5 K/UL    ABS. MONOCYTES 0.6 0.0 - 1.0 K/UL    ABS. EOSINOPHILS 0.2 0.0 - 0.4 K/UL    ABS. BASOPHILS 0.0 0.0 - 0.1 K/UL    ABS. IMM.  GRANS. 0.0 0.00 - 0.04 K/UL    DF AUTOMATED     METABOLIC PANEL, COMPREHENSIVE Collection Time: 01/03/23 10:55 AM   Result Value Ref Range    Sodium 131 (L) 136 - 145 mmol/L    Potassium 3.6 3.5 - 5.1 mmol/L    Chloride 97 97 - 108 mmol/L    CO2 29 21 - 32 mmol/L    Anion gap 5 5 - 15 mmol/L    Glucose 106 (H) 65 - 100 mg/dL    BUN 20 6 - 20 mg/dL    Creatinine 0.59 0.55 - 1.02 mg/dL    BUN/Creatinine ratio 34 (H) 12 - 20      eGFR >60 >60 ml/min/1.73m2    Calcium 8.4 (L) 8.5 - 10.1 mg/dL    Bilirubin, total 1.0 0.2 - 1.0 mg/dL    AST (SGOT) 35 15 - 37 U/L    ALT (SGPT) 12 12 - 78 U/L    Alk. phosphatase 68 45 - 117 U/L    Protein, total 5.3 (L) 6.4 - 8.2 g/dL    Albumin 2.2 (L) 3.5 - 5.0 g/dL    Globulin 3.1 2.0 - 4.0 g/dL    A-G Ratio 0.7 (L) 1.1 - 2.2     GLUCOSE, POC    Collection Time: 01/03/23 10:41 PM   Result Value Ref Range    Glucose (POC) 134 (H) 65 - 100 mg/dL    Performed by Anika Dallas      XR FLUOROSCOPY UNDER 60 MINUTES   Final Result   Right hip ORIF in progress.           Time spent in direct and indirect care including coordination of services: Greater than 35 minutes    Signed:  Christian Zhao NP  1/4/2023  2:19 PM

## 2023-01-04 NOTE — PROGRESS NOTES
Pulmonary and Critical Care progress note    Subjective:         Chief Complaint:   Chief Complaint   Patient presents with    Fall        Patient seen and examined  Overnight events noted  Daughter present at bedside. almost back to her baseline.   No cough,   No acute distress  S/p right hip surgery 12/30/2022     Review of Systems:  Will do any review of system because of patient's altered mental status    Current Facility-Administered Medications   Medication Dose Route Frequency Provider Last Rate Last Admin    sodium chloride (NS) flush 5-40 mL  5-40 mL IntraVENous Q8H Rakesh Garza MD   10 mL at 01/04/23 0512    sodium chloride (NS) flush 5-40 mL  5-40 mL IntraVENous PRN Rakesh Garza MD        aspirin delayed-release tablet 325 mg  325 mg Oral BID Rakesh Garza MD   325 mg at 01/04/23 8591    celecoxib (CELEBREX) capsule 200 mg  200 mg Oral DAILY Rakesh Garza MD   200 mg at 01/04/23 0923    meropenem (MERREM) 1 g in 0.9% sodium chloride (MBP/ADV) 50 mL MBP  1 g IntraVENous Q8H Rakesh Garza  mL/hr at 01/04/23 0537 1 g at 01/04/23 0537    albuterol CONCENTRATE 2.5mg/0.5 mL neb soln  2.5 mg Nebulization Q4H PRN Rakesh Garza MD   2.5 mg at 01/04/23 1253    allopurinoL (ZYLOPRIM) tablet 300 mg  300 mg Oral DAILY Rakesh Garza MD   300 mg at 01/04/23 4560    cyanocobalamin (VITAMIN B12) tablet 2,000 mcg  2,000 mcg Oral DAILY Rakesh Garza MD   2,000 mcg at 01/04/23 7472    ergocalciferol capsule 50,000 Units  50,000 Units Oral Q7D Rakesh Garza MD        furosemide (LASIX) tablet 20 mg  20 mg Oral DAILY Rakesh Garza MD   20 mg at 01/04/23 0923    lisinopriL (PRINIVIL, ZESTRIL) tablet 2.5 mg  2.5 mg Oral DAILY Rakesh Garza MD   2.5 mg at 01/04/23 0922    memantine (NAMENDA) tablet 5 mg  5 mg Oral DAILY Rakesh Garza MD   5 mg at 01/04/23 3070    metoprolol succinate (TOPROL-XL) XL tablet 50 mg  50 mg Oral DAILY Katya Truong MD   50 mg at 23 0923    montelukast (SINGULAIR) tablet 10 mg  10 mg Oral DAILY Katya Truong MD   10 mg at 23 4945    acidophilus-pectin, citrus tablet 1 Tablet  1 Tablet Oral BID Katya Truong MD   1 Tablet at 23 0938    sertraline (ZOLOFT) tablet 50 mg  50 mg Oral DAILY Katya Truong MD   50 mg at 23 3394    budesonide-formoteroL (SYMBICORT) 160-4.5 mcg/actuation HFA inhaler 2 Puff  2 Puff Inhalation BID Katya Truong MD   2 Puff at 23 5414    thyroid (Pork) (ARMOUR) tablet 60 mg  60 mg Oral DAILY Katya Truong MD   60 mg at 23 0900    sodium chloride (NS) flush 5-40 mL  5-40 mL IntraVENous Q8H Katya Truong MD   10 mL at 23 0512    sodium chloride (NS) flush 5-40 mL  5-40 mL IntraVENous PRN Katya Truong MD        acetaminophen (TYLENOL) tablet 1,000 mg  1,000 mg Oral Q6H Katya Truong MD   1,000 mg at 23 8751          Allergies   Allergen Reactions    Cipro [Ciprofloxacin Hcl] Other (comments)    Ketek [Telithromycin] Other (comments)    Mobic [Meloxicam] Other (comments)    Penicillin Other (comments)    Shrimp Extract Other (comments)           Objective:     Blood pressure 122/72, pulse 69, temperature 97.6 °F (36.4 °C), resp. rate 16, weight 73 kg (160 lb 15 oz), SpO2 95 %. Temp (24hrs), Av.4 °F (36.3 °C), Min:97 °F (36.1 °C), Max:97.7 °F (36.5 °C)      Intake and Output:  Current Shift: 701 - 1900  In: 250 [P.O.:250]  Out: -   Last 3 Shifts: 1901 - 700  In: 500 [P.O.:350; I.V.:150]  Out: 1000 [Urine:1000]    Intake/Output Summary (Last 24 hours) at 2023 1257  Last data filed at 2023 0745  Gross per 24 hour   Intake 250 ml   Output --   Net 250 ml        Physical Exam:     General: Lying in bed altered mental status. Eye: Reactive, symmetric  Throat and Neck: Supple  Lung: Reduced air entry bilaterally with rhonchi. Heart: S1+S2.   No murmurs  Abdomen: soft, non-tender. Bowel sounds normal. No masses; obese  Extremities: No edema  : Not done  Skin: No cyanosis  Neurologic: A & O x3. Grossly nonfocal  Psychiatric: Appropriate affect; coherent      Lab/Data Review:    Recent Results (from the past 24 hour(s))   GLUCOSE, POC    Collection Time: 01/03/23 10:41 PM   Result Value Ref Range    Glucose (POC) 134 (H) 65 - 100 mg/dL    Performed by Michelle Terry    CBC WITH AUTOMATED DIFF    Collection Time: 01/04/23  8:24 AM   Result Value Ref Range    WBC 7.9 3.6 - 11.0 K/uL    RBC 2.85 (L) 3.80 - 5.20 M/uL    HGB 9.0 (L) 11.5 - 16.0 g/dL    HCT 25.5 (L) 35.0 - 47.0 %    MCV 89.5 80.0 - 99.0 FL    MCH 31.6 26.0 - 34.0 PG    MCHC 35.3 30.0 - 36.5 g/dL    RDW 14.6 (H) 11.5 - 14.5 %    PLATELET 369 235 - 645 K/uL    MPV 10.0 8.9 - 12.9 FL    NRBC 0.0 0.0  WBC    ABSOLUTE NRBC 0.00 0.00 - 0.01 K/uL    NEUTROPHILS 75 32 - 75 %    LYMPHOCYTES 15 12 - 49 %    MONOCYTES 7 5 - 13 %    EOSINOPHILS 2 0 - 7 %    BASOPHILS 0 0 - 1 %    IMMATURE GRANULOCYTES 1 (H) 0 - 0.5 %    ABS. NEUTROPHILS 5.9 1.8 - 8.0 K/UL    ABS. LYMPHOCYTES 1.1 0.8 - 3.5 K/UL    ABS. MONOCYTES 0.6 0.0 - 1.0 K/UL    ABS. EOSINOPHILS 0.2 0.0 - 0.4 K/UL    ABS. BASOPHILS 0.0 0.0 - 0.1 K/UL    ABS. IMM. GRANS. 0.0 0.00 - 0.04 K/UL    DF AUTOMATED     METABOLIC PANEL, COMPREHENSIVE    Collection Time: 01/04/23  8:24 AM   Result Value Ref Range    Sodium 129 (L) 136 - 145 mmol/L    Potassium 3.5 3.5 - 5.1 mmol/L    Chloride 95 (L) 97 - 108 mmol/L    CO2 30 21 - 32 mmol/L    Anion gap 4 (L) 5 - 15 mmol/L    Glucose 104 (H) 65 - 100 mg/dL    BUN 16 6 - 20 mg/dL    Creatinine 0.50 (L) 0.55 - 1.02 mg/dL    BUN/Creatinine ratio 32 (H) 12 - 20      eGFR >60 >60 ml/min/1.73m2    Calcium 8.3 (L) 8.5 - 10.1 mg/dL    Bilirubin, total 0.9 0.2 - 1.0 mg/dL    AST (SGOT) 29 15 - 37 U/L    ALT (SGPT) 13 12 - 78 U/L    Alk.  phosphatase 71 45 - 117 U/L    Protein, total 5.1 (L) 6.4 - 8.2 g/dL    Albumin 2.1 (L) 3.5 - 5.0 g/dL    Globulin 3.0 2.0 - 4.0 g/dL    A-G Ratio 0.7 (L) 1.1 - 2.2         XR FLUOROSCOPY UNDER 60 MINUTES   Final Result   Right hip ORIF in progress. CT Results  (Last 48 hours)      None              Assessment:     1. Bilateral pneumonia  2. Asthma  3. Right hip fracture  4. Preoperative clearance  5. UTI  6. Hypertension  7. History of dementia    Plan:     Currently on room air  Use supplemental oxygen if needed to keep saturation above 92%    Chest x-ray shows bilateral patchy infiltrates suspicious for pneumonia or volume excess  Seems to be aspirating continue antibiotics  Culture sent  On antibiotics. Patient has a acute right hip fracture  S/p surgery 12/30  Antibiotics for UTI    Continue with her medications  Continue medications for gout    DVT and GI prophylaxis  With her significant cognitive decline over last couple of days, family is contemplating hospice  Discussed with daughter. Patient has going to go home for home rehab.     Questions of patient were answered at bedside in detail  Case discussed in detail with RN, RT, and care team  Thank you for involving me in the care of this patient  I will follow with you closely during hospitalization    Time spent more than 20 minutes in direct patient care with no overlap    Kimmie Turner MD  [facility]  1/4/2023

## 2023-01-04 NOTE — PROGRESS NOTES
Problem: Mobility Impaired (Adult and Pediatric)  Goal: *Acute Goals and Plan of Care (Insert Text)  Description: Patient stated goal: get better  Patient will move from supine to sit and sit to supine , scoot up and down, and roll side to side in bed with minimal assistance/contact guard assist within 7 day(s). Patient will transfer from bed to chair and chair to bed with minimal assistance/contact guard assist using the least restrictive device within 7 day(s). Patient will improve static standing balance to minimal assistance within 1 week(s). Patient will ambulate 20 feet with minimal assistance with least restrictive device within 1 weeks. Outcome: Progressing Towards Goal   PHYSICAL THERAPY TREATMENT  Patient: Cinthia Porras (58 y.o. female)  Date: 1/4/2023  Diagnosis: Hip fracture ([facility] [address] [S72.009A]  UTI (urinary tract infection) [N39.0] <principal problem not specified>  Procedure(s) (LRB):  RIGHT FEMUR GAMMA NAIL INSERTION (Right) 5 Days Post-Op  Precautions:    Chart, physical therapy assessment, plan of care and goals were reviewed. ASSESSMENT  Patient continues with skilled PT services and is progressing towards goals. Pt found semi supine upon PTA/DOMINGUEZ arrival, agreeable to session. (See below for objective details and assist levels). Overall pt tolerated session fair today with bed mobility and transfers. Pt demo's significant progress towards functional mobility requiring less A and additional time. Pt able to transfer to EOB with min -mod A x2 for RLE and trunk support, able to progress to CGA for sitting balance with UE support. Pt performed x3 sit<>stand with mod A x2 and RW, first attempt <30 secs each attempt after able to progress to ~1 min eahc. In stand pt able to perform side steps to EOB requiring constant cues for task sequence. Will continue to benefit from skilled PT services, and will continue to progress as tolerated.      Current Level of Function Impacting Discharge (mobility/balance): functional mobility     Other factors to consider for discharge: amount of A needed for mobility          PLAN :  Patient continues to benefit from skilled intervention to address the above impairments. Continue treatment per established plan of care to address goals. Recommend with staff: Encourage HEP in prep for ADLs/mobility and Frequent repositioning to prevent skin breakdown    Recommendation for discharge: (in order for the patient to meet his/her long term goals)  Ruddy Frey    This discharge recommendation:  Has been made in collaboration with the attending provider and/or case management    IF patient discharges home will need the following DME: to be determined (TBD)       SUBJECTIVE:   Patient stated Parish Walsh we going to get in the chair? Darling Moore    OBJECTIVE DATA SUMMARY:   Critical Behavior:  Neurologic State: Alert  Orientation Level: Oriented X4  Cognition: Follows commands     Functional Mobility Training:  Bed Mobility:  Supine to Sit: Moderate assistance;Assist x2;Minimum assistance  Sit to Supine: Moderate assistance;Assist x2  Scooting: Minimum assistance  Transfers:  Sit to Stand: Moderate assistance;Assist x2  Stand to Sit: Moderate assistance;Assist x2  Balance:  Sitting: Impaired; With support  Sitting - Static: Fair (occasional)  Sitting - Dynamic: Fair (occasional)  Standing: Impaired; With support  Standing - Static: Fair;Constant support  Standing - Dynamic : Fair;Constant support  Ambulation/Gait Training:       Therapeutic Exercises:       EXERCISE   Sets   Reps   Active Active Assist   Passive Self ROM   Comments   Ankle Pumps   [] [] [] []    Quad Sets/Glut Sets   [] [] [] []    Hamstring Sets   [] [] [] []    Short Arc Quads   [] [] [] []    Heel Slides   [] [] [] []    Straight Leg Raises   [] [] [] []    Hip abd/add   [] [] [] []    Long Arc Quads   [] [] [] []    Marching   [] [] [] []       [] [] [] []       Pain Rating:  Pre mobility 8/10 end of session at rest 3/10    Activity Tolerance:   Fair and requires rest breaks    After treatment patient left in no apparent distress:   Bed locked and returned to lowest position, Supine in bed, Call bell within reach, Bed / chair alarm activated, and Caregiver / family present    COMMUNICATION/COLLABORATION:   The patients plan of care was discussed with: Occupational therapy assistant and Registered nurse.      PTA/DOMINGUEZ cotreat to maximize pt and clinician safety     Sulaiman Grade, PTA, PT   Time Calculation: 40 mins

## 2023-01-05 NOTE — PROGRESS NOTES
CARLY returned phone call to Yamilet Lawson at [facility] [phone] who stated patient's family decided they do not want hospice. Yamilet Lawson explained they do not have the capability to take care of the patient. CARLY spoke with CM Admin regarding case. CM telephoned Yamilet Lawson again sharing HERMINIA would be responsible for obtaining a higher level of care such as SNF. Cindy verbalized understanding.

## 2023-01-12 ENCOUNTER — TRANSCRIBE ORDER (OUTPATIENT)
Dept: REGISTRATION | Age: 88
End: 2023-01-12

## 2023-01-12 ENCOUNTER — HOSPITAL ENCOUNTER (OUTPATIENT)
Dept: GENERAL RADIOLOGY | Age: 88
Discharge: HOME | End: 2023-01-12
Attending: INTERNAL MEDICINE
Payer: MEDICARE

## 2023-01-12 DIAGNOSIS — R52 INCREASED PAIN: Primary | ICD-10-CM

## 2023-01-12 DIAGNOSIS — R52 INCREASED PAIN: ICD-10-CM

## 2023-01-12 PROCEDURE — 73502 X-RAY EXAM HIP UNI 2-3 VIEWS: CPT

## 2023-01-12 PROCEDURE — 73560 X-RAY EXAM OF KNEE 1 OR 2: CPT

## 2023-01-27 ENCOUNTER — TRANSCRIBE ORDER (OUTPATIENT)
Dept: SCHEDULING | Age: 88
End: 2023-01-27

## 2023-01-28 ENCOUNTER — HOSPITAL ENCOUNTER (OUTPATIENT)
Dept: LAB | Age: 88
Discharge: HOME | End: 2023-01-28
Payer: MEDICARE

## 2023-01-28 LAB
APPEARANCE UR: CLEAR
BACTERIA URNS QL MICRO: ABNORMAL /HPF
BILIRUB UR QL: NEGATIVE
COLOR UR: YELLOW
EPITH CASTS URNS QL MICRO: ABNORMAL /LPF (ref 0–20)
GLUCOSE UR STRIP.AUTO-MCNC: NEGATIVE MG/DL
HGB UR QL STRIP: NEGATIVE
KETONES UR QL STRIP.AUTO: NEGATIVE MG/DL
LEUKOCYTE ESTERASE UR QL STRIP.AUTO: ABNORMAL
NITRITE UR QL STRIP.AUTO: NEGATIVE
PH UR STRIP: 6 (ref 5–8)
PROT UR STRIP-MCNC: NEGATIVE MG/DL
RBC #/AREA URNS HPF: ABNORMAL /HPF (ref 0–2)
SP GR UR REFRACTOMETRY: 1.01 (ref 1–1.03)
UROBILINOGEN UR QL STRIP.AUTO: 0.2 EU/DL (ref 0.2–1)
WBC URNS QL MICRO: ABNORMAL /HPF (ref 0–4)
YEAST URNS QL MICRO: PRESENT

## 2023-01-28 PROCEDURE — 81001 URINALYSIS AUTO W/SCOPE: CPT

## 2023-01-30 ENCOUNTER — TRANSCRIBE ORDER (OUTPATIENT)
Dept: SCHEDULING | Age: 88
End: 2023-01-30

## 2023-01-30 ENCOUNTER — HOSPITAL ENCOUNTER (OUTPATIENT)
Dept: VASCULAR SURGERY | Age: 88
Discharge: HOME | End: 2023-01-30
Attending: NURSE PRACTITIONER

## 2023-01-30 DIAGNOSIS — M79.89 RIGHT LEG SWELLING: Primary | ICD-10-CM

## 2023-01-30 DIAGNOSIS — M79.89 RIGHT LEG SWELLING: ICD-10-CM

## 2023-01-30 DIAGNOSIS — M79.604 PAIN IN RIGHT LEG: ICD-10-CM

## 2023-01-30 PROCEDURE — 93971 EXTREMITY STUDY: CPT

## 2023-01-31 PROBLEM — N39.0 UTI (URINARY TRACT INFECTION): Status: RESOLVED | Noted: 2022-12-29 | Resolved: 2023-01-31

## 2023-02-06 ENCOUNTER — TRANSCRIBE ORDER (OUTPATIENT)
Dept: REGISTRATION | Age: 88
End: 2023-02-06

## 2023-02-06 ENCOUNTER — HOSPITAL ENCOUNTER (OUTPATIENT)
Dept: GENERAL RADIOLOGY | Age: 88
Discharge: HOME | End: 2023-02-06
Attending: INTERNAL MEDICINE
Payer: MEDICARE

## 2023-02-06 DIAGNOSIS — R09.81 COUGH WITH CONGESTION OF PARANASAL SINUS: Primary | ICD-10-CM

## 2023-02-06 DIAGNOSIS — R09.81 COUGH WITH CONGESTION OF PARANASAL SINUS: ICD-10-CM

## 2023-02-06 DIAGNOSIS — R05.8 COUGH WITH CONGESTION OF PARANASAL SINUS: Primary | ICD-10-CM

## 2023-02-06 DIAGNOSIS — R05.8 COUGH WITH CONGESTION OF PARANASAL SINUS: ICD-10-CM

## 2023-02-06 PROCEDURE — 71045 X-RAY EXAM CHEST 1 VIEW: CPT

## 2023-02-07 ENCOUNTER — HOSPITAL ENCOUNTER (OUTPATIENT)
Dept: LAB | Age: 88
Discharge: HOME | End: 2023-02-07
Payer: MEDICARE

## 2023-02-07 LAB
FLUAV RNA SPEC QL NAA+PROBE: NOT DETECTED
FLUBV RNA SPEC QL NAA+PROBE: NOT DETECTED
SARS-COV-2 RNA RESP QL NAA+PROBE: NOT DETECTED

## 2023-02-07 PROCEDURE — 87636 SARSCOV2 & INF A&B AMP PRB: CPT

## 2023-02-07 PROCEDURE — 36415 COLL VENOUS BLD VENIPUNCTURE: CPT

## 2023-03-23 ENCOUNTER — HOSPITAL ENCOUNTER (EMERGENCY)
Age: 88
Discharge: HOME | End: 2023-03-23
Attending: FAMILY MEDICINE
Payer: MEDICARE

## 2023-03-23 ENCOUNTER — APPOINTMENT (OUTPATIENT)
Age: 88
End: 2023-03-23
Payer: MEDICARE

## 2023-03-23 VITALS
OXYGEN SATURATION: 95 % | HEIGHT: 71 IN | SYSTOLIC BLOOD PRESSURE: 127 MMHG | DIASTOLIC BLOOD PRESSURE: 58 MMHG | HEART RATE: 90 BPM | RESPIRATION RATE: 20 BRPM | WEIGHT: 158.9 LBS | BODY MASS INDEX: 22.25 KG/M2

## 2023-03-23 DIAGNOSIS — R04.0 NASAL BLEEDING: Primary | ICD-10-CM

## 2023-03-23 LAB
BASOPHILS # BLD: 0 K/UL (ref 0–0.1)
BASOPHILS NFR BLD: 1 % (ref 0–2)
DIFFERENTIAL METHOD BLD: ABNORMAL
EOSINOPHIL # BLD: 0.1 K/UL (ref 0–0.4)
EOSINOPHIL NFR BLD: 1 % (ref 0–5)
ERYTHROCYTE [DISTWIDTH] IN BLOOD BY AUTOMATED COUNT: 15.2 % (ref 11.6–14.5)
HCT VFR BLD AUTO: 38.2 % (ref 35–45)
HGB BLD-MCNC: 13 G/DL (ref 12–16)
IMM GRANULOCYTES # BLD AUTO: 0 K/UL (ref 0–0.04)
IMM GRANULOCYTES NFR BLD AUTO: 0 % (ref 0–0.5)
LYMPHOCYTES # BLD: 2.6 K/UL (ref 0.9–3.6)
LYMPHOCYTES NFR BLD: 40 % (ref 21–52)
MCH RBC QN AUTO: 31.6 PG (ref 24–34)
MCHC RBC AUTO-ENTMCNC: 34 G/DL (ref 31–37)
MCV RBC AUTO: 92.7 FL (ref 78–100)
MONOCYTES # BLD: 0.5 K/UL (ref 0.05–1.2)
MONOCYTES NFR BLD: 7 % (ref 3–10)
NEUTS SEG # BLD: 3.3 K/UL (ref 1.8–8)
NEUTS SEG NFR BLD: 51 % (ref 40–73)
NRBC # BLD: 0 K/UL (ref 0–0.01)
NRBC BLD-RTO: 0 PER 100 WBC
PLATELET # BLD AUTO: 221 K/UL (ref 135–420)
PMV BLD AUTO: 10.7 FL (ref 9.2–11.8)
RBC # BLD AUTO: 4.12 M/UL (ref 4.2–5.3)
WBC # BLD AUTO: 6.5 K/UL (ref 4.6–13.2)

## 2023-03-23 PROCEDURE — 30905 CONTROL OF NOSEBLEED: CPT

## 2023-03-23 PROCEDURE — 70450 CT HEAD/BRAIN W/O DYE: CPT

## 2023-03-23 PROCEDURE — 85025 COMPLETE CBC W/AUTO DIFF WBC: CPT

## 2023-03-23 PROCEDURE — 36415 COLL VENOUS BLD VENIPUNCTURE: CPT

## 2023-03-23 PROCEDURE — 99284 EMERGENCY DEPT VISIT MOD MDM: CPT

## 2023-03-23 RX ORDER — CHOLECALCIFEROL (VITAMIN D3) 125 MCG
500 CAPSULE ORAL DAILY
COMMUNITY

## 2023-03-23 ASSESSMENT — ENCOUNTER SYMPTOMS
SINUS PRESSURE: 0
RESPIRATORY NEGATIVE: 1
GASTROINTESTINAL NEGATIVE: 1

## 2023-03-23 ASSESSMENT — PAIN - FUNCTIONAL ASSESSMENT
PAIN_FUNCTIONAL_ASSESSMENT: NONE - DENIES PAIN

## 2023-03-23 NOTE — ED NOTES
Rhino rocket has been placed in right nare per Dr Hernandez Miss.  Per staff at the [facility] patient reported headache earlier     Kg Keita RN  03/23/23 2261

## 2023-03-23 NOTE — ED NOTES
I have reviewed discharge instructions with the patient and caregiver. The patient and caregiver verbalized understanding.        Reviewed medication compliance, follow up with PCP/ENT, return to ER for any new or worrisome concerns     Brittni Collins RN  03/23/23 7959

## 2023-03-23 NOTE — ED NOTES
Keyana at [facility] contacted and advised of patient status and ready for discharge. Will send someone to pick patient up.       Asha Evans RN  03/23/23 7720

## 2023-03-23 NOTE — ED NOTES
Packing removed from right nare no bleeding at this time. Will continue to monitor and discharge if no further bleeding. Patient and family aware.       Collin Paz RN  03/23/23 7460 No lymphadedenopathy

## 2023-03-23 NOTE — ED NOTES
Family at bedside, awaiting all test results. No further bleeding at this time. Patient has been cleaned and hospital gown applied.  Has been up to [facility] to have BM with no difficulty     Amparo Santamaria RN  03/23/23 2070

## 2023-04-25 ENCOUNTER — HOSPITAL ENCOUNTER (EMERGENCY)
Age: 88
Discharge: HOME | End: 2023-04-25
Attending: FAMILY MEDICINE
Payer: MEDICARE

## 2023-04-25 VITALS
RESPIRATION RATE: 16 BRPM | BODY MASS INDEX: 24.05 KG/M2 | DIASTOLIC BLOOD PRESSURE: 65 MMHG | WEIGHT: 168 LBS | OXYGEN SATURATION: 98 % | SYSTOLIC BLOOD PRESSURE: 125 MMHG | HEART RATE: 89 BPM | HEIGHT: 70 IN

## 2023-04-25 DIAGNOSIS — R04.0 RIGHT-SIDED EPISTAXIS: Primary | ICD-10-CM

## 2023-04-25 PROCEDURE — 30901 CONTROL OF NOSEBLEED: CPT

## 2023-04-25 PROCEDURE — 99283 EMERGENCY DEPT VISIT LOW MDM: CPT

## 2023-04-25 ASSESSMENT — LIFESTYLE VARIABLES
HOW OFTEN DO YOU HAVE A DRINK CONTAINING ALCOHOL: NEVER
HOW MANY STANDARD DRINKS CONTAINING ALCOHOL DO YOU HAVE ON A TYPICAL DAY: PATIENT DOES NOT DRINK

## 2023-04-25 NOTE — DISCHARGE INSTRUCTIONS
As we spoke my recommendation would be to touch base with the primary care doctor and get referred to ear nose and throat or with your connection be seen by ear nose and throat for cauterization so the patient can continue her anticoagulation. I would have the nasal tampon removed in 2 days. If the bleeding returns please come back. Do not pull out the string. Return to the emergency department as any questions or concerns.

## 2023-04-25 NOTE — ED PROVIDER NOTES
[facility] EMERGENCY DEPT  EMERGENCY DEPARTMENT ENCOUNTER      Pt Name: Flaco Mathews  MRN: [id number]  [place] 1934  Date of evaluation: 4/25/2023  Provider: Jazz Pablo DO    CHIEF COMPLAINT       Chief Complaint   Patient presents with    Epistaxis         HISTORY OF PRESENT ILLNESS   (Location/Symptom, Timing/Onset, Context/Setting, Quality, Duration, Modifying Factors, Severity)  Note limiting factors. Flaco Mathews is a 80 y.o. female who presents to the emergency department patient comes in from the memory unit. Making HPI somewhat difficult. The son does eventually show up. Stating that he she is having a nosebleed. They did find that she was rubbing her nose earlier. Is on anticoagulation for A-fib. She denies any chest pain shortness of breath just nosebleed. HPI is not reliable from the patient secondary to dementia. Nursing Notes were reviewed. REVIEW OF SYSTEMS    (2-9 systems for level 4, 10 or more for level 5)     Review of Systems   Unable to perform ROS: Dementia     Except as noted above the remainder of the review of systems was reviewed and negative.        PAST MEDICAL HISTORY     Past Medical History:   Diagnosis Date    Anxiety     Arthropathy     Asthma     Atrial fibrillation (HCC)     CHF (congestive heart failure) ([facility] [address]     Community acquired pneumonia     Dementia ([facility] [address]     Gout     Hypertension     Ill-defined condition     dementia    Thyroid disease          SURGICAL HISTORY       Past Surgical History:   Procedure Laterality Date    ORTHOPEDIC SURGERY      left ankle orif    TONSILLECTOMY           CURRENT MEDICATIONS       Current Discharge Medication List        CONTINUE these medications which have NOT CHANGED    Details   Cranberry-Cholecalciferol 4200-500 MG-UNIT CAPS Take by mouth      LACTOBACILLUS PROBIOTIC PO Take by mouth      vitamin B-12 (CYANOCOBALAMIN) 500 MCG tablet Take 500 mcg by mouth daily      rivaroxaban (XARELTO) 15 MG TABS tablet Take 15

## 2023-08-15 ENCOUNTER — APPOINTMENT (OUTPATIENT)
Facility: HOSPITAL | Age: 88
DRG: 522 | End: 2023-08-15
Payer: MEDICARE

## 2023-08-15 ENCOUNTER — APPOINTMENT (OUTPATIENT)
Age: 88
End: 2023-08-15
Payer: MEDICARE

## 2023-08-15 ENCOUNTER — HOSPITAL ENCOUNTER (INPATIENT)
Facility: HOSPITAL | Age: 88
LOS: 4 days | Discharge: NURSING, REHAB OR LONG-TERM CARE | DRG: 522 | End: 2023-08-19
Attending: EMERGENCY MEDICINE | Admitting: INTERNAL MEDICINE
Payer: MEDICARE

## 2023-08-15 ENCOUNTER — HOSPITAL ENCOUNTER (EMERGENCY)
Age: 88
Discharge: ANOTHER HOSPITAL | End: 2023-08-15
Attending: EMERGENCY MEDICINE
Payer: MEDICARE

## 2023-08-15 VITALS
RESPIRATION RATE: 16 BRPM | WEIGHT: 170.8 LBS | HEART RATE: 81 BPM | OXYGEN SATURATION: 95 % | BODY MASS INDEX: 24.45 KG/M2 | DIASTOLIC BLOOD PRESSURE: 80 MMHG | SYSTOLIC BLOOD PRESSURE: 135 MMHG | TEMPERATURE: 99 F | HEIGHT: 70 IN

## 2023-08-15 DIAGNOSIS — S72.8X2A OTHER FRACTURE OF LEFT FEMUR, INITIAL ENCOUNTER FOR CLOSED FRACTURE (HCC): ICD-10-CM

## 2023-08-15 DIAGNOSIS — S72.002A CLOSED FRACTURE OF LEFT HIP, INITIAL ENCOUNTER (HCC): Primary | ICD-10-CM

## 2023-08-15 DIAGNOSIS — S72.002A CLOSED FRACTURE OF NECK OF LEFT FEMUR, INITIAL ENCOUNTER (HCC): Primary | ICD-10-CM

## 2023-08-15 LAB
ANION GAP SERPL CALC-SCNC: 9 MMOL/L (ref 3–18)
APPEARANCE UR: CLEAR
BACTERIA URNS QL MICRO: ABNORMAL /HPF
BASOPHILS # BLD: 0 K/UL (ref 0–0.1)
BASOPHILS NFR BLD: 0 % (ref 0–2)
BILIRUB UR QL: NEGATIVE
BUN SERPL-MCNC: 20 MG/DL (ref 7–18)
BUN/CREAT SERPL: 28 (ref 12–20)
CA-I BLD-MCNC: 8.6 MG/DL (ref 8.5–10.1)
CHLORIDE SERPL-SCNC: 101 MMOL/L (ref 100–111)
CO2 SERPL-SCNC: 26 MMOL/L (ref 21–32)
COLOR UR: YELLOW
CREAT SERPL-MCNC: 0.71 MG/DL (ref 0.6–1.3)
DIFFERENTIAL METHOD BLD: ABNORMAL
ECHO AO ASC DIAM: 3.6 CM
ECHO AO ASCENDING AORTA INDEX: 1.85 CM/M2
ECHO AO ROOT DIAM: 3.1 CM
ECHO AO ROOT INDEX: 1.59 CM/M2
ECHO AR MAX VEL PISA: 3.3 M/S
ECHO AV AREA PEAK VELOCITY: 1.5 CM2
ECHO AV AREA VTI: 1.6 CM2
ECHO AV AREA/BSA PEAK VELOCITY: 0.8 CM2/M2
ECHO AV AREA/BSA VTI: 0.8 CM2/M2
ECHO AV CUSP MM: 1 CM
ECHO AV MEAN GRADIENT: 9 MMHG
ECHO AV MEAN VELOCITY: 1.4 M/S
ECHO AV PEAK GRADIENT: 17 MMHG
ECHO AV PEAK VELOCITY: 2 M/S
ECHO AV REGURGITANT PHT: 696 MS
ECHO AV VELOCITY RATIO: 0.5
ECHO AV VTI: 37.3 CM
ECHO BSA: 1.95 M2
ECHO EST RA PRESSURE: 5 MMHG
ECHO LA AREA 2C: 30.6 CM2
ECHO LA AREA 4C: 35.6 CM2
ECHO LA DIAMETER INDEX: 1.95 CM/M2
ECHO LA DIAMETER: 3.8 CM
ECHO LA MAJOR AXIS: 7 CM
ECHO LA MINOR AXIS: 7.4 CM
ECHO LA TO AORTIC ROOT RATIO: 1.23
ECHO LA VOL 2C: 108 ML (ref 22–52)
ECHO LA VOL 4C: 151 ML (ref 22–52)
ECHO LA VOL BP: 131 ML (ref 22–52)
ECHO LA VOL/BSA BIPLANE: 67 ML/M2 (ref 16–34)
ECHO LA VOLUME INDEX A2C: 55 ML/M2 (ref 16–34)
ECHO LA VOLUME INDEX A4C: 77 ML/M2 (ref 16–34)
ECHO LV E' LATERAL VELOCITY: 8 CM/S
ECHO LV E' SEPTAL VELOCITY: 6 CM/S
ECHO LV EDV A2C: 131 ML
ECHO LV EDV A4C: 129 ML
ECHO LV EDV INDEX A4C: 66 ML/M2
ECHO LV EDV NDEX A2C: 67 ML/M2
ECHO LV EJECTION FRACTION A2C: 36 %
ECHO LV EJECTION FRACTION A4C: 45 %
ECHO LV EJECTION FRACTION BIPLANE: 40 % (ref 55–100)
ECHO LV ESV A2C: 85 ML
ECHO LV ESV A4C: 71 ML
ECHO LV ESV INDEX A2C: 44 ML/M2
ECHO LV ESV INDEX A4C: 36 ML/M2
ECHO LV FRACTIONAL SHORTENING: 14 % (ref 28–44)
ECHO LV INTERNAL DIMENSION DIASTOLE INDEX: 2.62 CM/M2
ECHO LV INTERNAL DIMENSION DIASTOLIC MMODE: 6 CM (ref 3.9–5.3)
ECHO LV INTERNAL DIMENSION DIASTOLIC: 5.1 CM (ref 3.9–5.3)
ECHO LV INTERNAL DIMENSION SYSTOLIC INDEX: 2.26 CM/M2
ECHO LV INTERNAL DIMENSION SYSTOLIC MMODE: 4.4 CM
ECHO LV INTERNAL DIMENSION SYSTOLIC: 4.4 CM
ECHO LV IVSD: 1.1 CM (ref 0.6–0.9)
ECHO LV MASS 2D: 200.8 G (ref 67–162)
ECHO LV MASS INDEX 2D: 103 G/M2 (ref 43–95)
ECHO LV POSTERIOR WALL DIASTOLIC MMODE: 0.8 CM (ref 0.6–0.9)
ECHO LV POSTERIOR WALL DIASTOLIC: 1 CM (ref 0.6–0.9)
ECHO LV RELATIVE WALL THICKNESS RATIO: 0.39
ECHO LVOT AREA: 3.1 CM2
ECHO LVOT AV VTI INDEX: 0.52
ECHO LVOT DIAM: 2 CM
ECHO LVOT MEAN GRADIENT: 2 MMHG
ECHO LVOT PEAK GRADIENT: 4 MMHG
ECHO LVOT PEAK VELOCITY: 1 M/S
ECHO LVOT STROKE VOLUME INDEX: 31.4 ML/M2
ECHO LVOT SV: 61.2 ML
ECHO LVOT VTI: 19.5 CM
ECHO MV A VELOCITY: 0.37 M/S
ECHO MV E DECELERATION TIME (DT): 114 MS
ECHO MV E VELOCITY: 1.31 M/S
ECHO MV E/A RATIO: 3.54
ECHO MV E/E' LATERAL: 16.38
ECHO MV E/E' RATIO (AVERAGED): 19.1
ECHO MV E/E' SEPTAL: 21.83
ECHO MV EROA PISA: 0.2 CM2
ECHO MV REGURGITANT ALIASING (NYQUIST) VELOCITY: 30 CM/S
ECHO MV REGURGITANT PEAK GRADIENT: 88 MMHG
ECHO MV REGURGITANT PEAK VELOCITY: 4.7 M/S
ECHO MV REGURGITANT RADIUS PISA: 0.7 CM
ECHO MV REGURGITANT VOLUME PISA: 32.02 ML
ECHO MV REGURGITANT VTIA: 163 CM
ECHO PULMONARY ARTERY END DIASTOLIC PRESSURE: 13 MMHG
ECHO PV AREA CONTINUITY EQ VELOCITY: 3.9 CM2
ECHO PV MAX VELOCITY: 0.9 M/S
ECHO PV PEAK GRADIENT: 3 MMHG
ECHO PV REGURGITANT MAX VELOCITY: 1.8 M/S
ECHO QP:QS RATIO: 1.34 NO UNITS
ECHO RA AREA 4C: 21.2 CM2
ECHO RA END SYSTOLIC VOLUME APICAL 4 CHAMBER INDEX BSA: 32 ML/M2
ECHO RA VOLUME: 62 ML
ECHO RIGHT VENTRICULAR SYSTOLIC PRESSURE (RVSP): 73 MMHG
ECHO RV BASAL DIMENSION: 3.4 CM
ECHO RV LONGITUDINAL DIMENSION: 7.2 CM
ECHO RV MID DIMENSION: 2.7 CM
ECHO RV TAPSE: 1.4 CM (ref 1.7–?)
ECHO RVOT AREA: 6.2 CM2
ECHO RVOT DIAMETER: 2.8 CM
ECHO RVOT MEAN GRADIENT: 1 MMHG
ECHO RVOT PEAK GRADIENT: 1 MMHG
ECHO RVOT PEAK VELOCITY: 0.6 M/S
ECHO RVOT STROKE VOLUME: 81.9 ML
ECHO RVOT VTI: 13.3 CM
ECHO TV REGURGITANT MAX VELOCITY: 4.12 M/S
ECHO TV REGURGITANT PEAK GRADIENT: 68 MMHG
EKG DIAGNOSIS: NORMAL
EKG Q-T INTERVAL: 368 MS
EKG QRS DURATION: 102 MS
EKG QTC CALCULATION (BAZETT): 419 MS
EKG R AXIS: 30 DEGREES
EKG T AXIS: 55 DEGREES
EKG VENTRICULAR RATE: 78 BPM
EOSINOPHIL # BLD: 0 K/UL (ref 0–0.4)
EOSINOPHIL NFR BLD: 0 % (ref 0–5)
EPITH CASTS URNS QL MICRO: ABNORMAL /LPF (ref 0–20)
ERYTHROCYTE [DISTWIDTH] IN BLOOD BY AUTOMATED COUNT: 15.7 % (ref 11.6–14.5)
GLUCOSE SERPL-MCNC: 148 MG/DL (ref 74–99)
GLUCOSE UR STRIP.AUTO-MCNC: NEGATIVE MG/DL
HCT VFR BLD AUTO: 36.8 % (ref 35–45)
HGB BLD-MCNC: 11.6 G/DL (ref 12–16)
HGB UR QL STRIP: NEGATIVE
IMM GRANULOCYTES # BLD AUTO: 0.1 K/UL (ref 0–0.04)
IMM GRANULOCYTES NFR BLD AUTO: 1 % (ref 0–0.5)
INR PPP: 1.4 (ref 0.9–1.1)
KETONES UR QL STRIP.AUTO: 15 MG/DL
LEUKOCYTE ESTERASE UR QL STRIP.AUTO: ABNORMAL
LYMPHOCYTES # BLD: 1.8 K/UL (ref 0.9–3.6)
LYMPHOCYTES NFR BLD: 15 % (ref 21–52)
MCH RBC QN AUTO: 27 PG (ref 24–34)
MCHC RBC AUTO-ENTMCNC: 31.5 G/DL (ref 31–37)
MCV RBC AUTO: 85.6 FL (ref 78–100)
MONOCYTES # BLD: 0.7 K/UL (ref 0.05–1.2)
MONOCYTES NFR BLD: 6 % (ref 3–10)
NEUTS SEG # BLD: 9.2 K/UL (ref 1.8–8)
NEUTS SEG NFR BLD: 78 % (ref 40–73)
NITRITE UR QL STRIP.AUTO: POSITIVE
NRBC # BLD: 0 K/UL (ref 0–0.01)
NRBC BLD-RTO: 0 PER 100 WBC
PH UR STRIP: 6 (ref 5–8)
PLATELET # BLD AUTO: 216 K/UL (ref 135–420)
PMV BLD AUTO: 10.2 FL (ref 9.2–11.8)
POTASSIUM SERPL-SCNC: 3.9 MMOL/L (ref 3.5–5.5)
PROT UR STRIP-MCNC: NEGATIVE MG/DL
PROTHROMBIN TIME: 17.4 SEC (ref 11.9–14.7)
RBC # BLD AUTO: 4.3 M/UL (ref 4.2–5.3)
RBC #/AREA URNS HPF: ABNORMAL /HPF (ref 0–2)
SODIUM SERPL-SCNC: 136 MMOL/L (ref 136–145)
SP GR UR REFRACTOMETRY: 1.01 (ref 1–1.03)
UROBILINOGEN UR QL STRIP.AUTO: 0.2 EU/DL (ref 0.2–1)
WBC # BLD AUTO: 11.8 K/UL (ref 4.6–13.2)
WBC URNS QL MICRO: ABNORMAL /HPF (ref 0–4)

## 2023-08-15 PROCEDURE — 2580000003 HC RX 258: Performed by: EMERGENCY MEDICINE

## 2023-08-15 PROCEDURE — 85610 PROTHROMBIN TIME: CPT

## 2023-08-15 PROCEDURE — 93005 ELECTROCARDIOGRAM TRACING: CPT | Performed by: NURSE PRACTITIONER

## 2023-08-15 PROCEDURE — 73700 CT LOWER EXTREMITY W/O DYE: CPT

## 2023-08-15 PROCEDURE — 6360000002 HC RX W HCPCS: Performed by: INTERNAL MEDICINE

## 2023-08-15 PROCEDURE — 6370000000 HC RX 637 (ALT 250 FOR IP)

## 2023-08-15 PROCEDURE — 94640 AIRWAY INHALATION TREATMENT: CPT

## 2023-08-15 PROCEDURE — 81001 URINALYSIS AUTO W/SCOPE: CPT

## 2023-08-15 PROCEDURE — C8929 TTE W OR WO FOL WCON,DOPPLER: HCPCS

## 2023-08-15 PROCEDURE — 36415 COLL VENOUS BLD VENIPUNCTURE: CPT

## 2023-08-15 PROCEDURE — 80048 BASIC METABOLIC PNL TOTAL CA: CPT

## 2023-08-15 PROCEDURE — 1100000000 HC RM PRIVATE

## 2023-08-15 PROCEDURE — 6370000000 HC RX 637 (ALT 250 FOR IP): Performed by: INTERNAL MEDICINE

## 2023-08-15 PROCEDURE — 73552 X-RAY EXAM OF FEMUR 2/>: CPT

## 2023-08-15 PROCEDURE — 99285 EMERGENCY DEPT VISIT HI MDM: CPT

## 2023-08-15 PROCEDURE — 71045 X-RAY EXAM CHEST 1 VIEW: CPT

## 2023-08-15 PROCEDURE — 85025 COMPLETE CBC W/AUTO DIFF WBC: CPT

## 2023-08-15 PROCEDURE — 6360000002 HC RX W HCPCS: Performed by: EMERGENCY MEDICINE

## 2023-08-15 PROCEDURE — 6360000002 HC RX W HCPCS: Performed by: FAMILY MEDICINE

## 2023-08-15 PROCEDURE — 96374 THER/PROPH/DIAG INJ IV PUSH: CPT

## 2023-08-15 PROCEDURE — 96375 TX/PRO/DX INJ NEW DRUG ADDON: CPT

## 2023-08-15 PROCEDURE — 87186 SC STD MICRODIL/AGAR DIL: CPT

## 2023-08-15 PROCEDURE — 6370000000 HC RX 637 (ALT 250 FOR IP): Performed by: PHYSICIAN ASSISTANT

## 2023-08-15 PROCEDURE — 2580000003 HC RX 258: Performed by: INTERNAL MEDICINE

## 2023-08-15 PROCEDURE — 87086 URINE CULTURE/COLONY COUNT: CPT

## 2023-08-15 PROCEDURE — 73502 X-RAY EXAM HIP UNI 2-3 VIEWS: CPT

## 2023-08-15 PROCEDURE — 87077 CULTURE AEROBIC IDENTIFY: CPT

## 2023-08-15 PROCEDURE — 93005 ELECTROCARDIOGRAM TRACING: CPT | Performed by: EMERGENCY MEDICINE

## 2023-08-15 RX ORDER — MEMANTINE HYDROCHLORIDE 10 MG/1
5 TABLET ORAL 2 TIMES DAILY
Status: DISCONTINUED | OUTPATIENT
Start: 2023-08-15 | End: 2023-08-19 | Stop reason: HOSPADM

## 2023-08-15 RX ORDER — SODIUM CHLORIDE 0.9 % (FLUSH) 0.9 %
5-40 SYRINGE (ML) INJECTION EVERY 12 HOURS SCHEDULED
Status: DISCONTINUED | OUTPATIENT
Start: 2023-08-15 | End: 2023-08-19 | Stop reason: HOSPADM

## 2023-08-15 RX ORDER — CYCLOBENZAPRINE HCL 10 MG
10 TABLET ORAL ONCE
Status: COMPLETED | OUTPATIENT
Start: 2023-08-15 | End: 2023-08-15

## 2023-08-15 RX ORDER — GUAIFENESIN 600 MG/1
600 TABLET, EXTENDED RELEASE ORAL 2 TIMES DAILY PRN
Status: DISCONTINUED | OUTPATIENT
Start: 2023-08-15 | End: 2023-08-19 | Stop reason: HOSPADM

## 2023-08-15 RX ORDER — ONDANSETRON 4 MG/1
4 TABLET, ORALLY DISINTEGRATING ORAL EVERY 8 HOURS PRN
Status: DISCONTINUED | OUTPATIENT
Start: 2023-08-15 | End: 2023-08-19 | Stop reason: HOSPADM

## 2023-08-15 RX ORDER — ACETAMINOPHEN 325 MG/1
650 TABLET ORAL EVERY 6 HOURS PRN
Status: DISCONTINUED | OUTPATIENT
Start: 2023-08-15 | End: 2023-08-15

## 2023-08-15 RX ORDER — ACETAMINOPHEN 500 MG
1000 TABLET ORAL EVERY 6 HOURS
Status: COMPLETED | OUTPATIENT
Start: 2023-08-15 | End: 2023-08-15

## 2023-08-15 RX ORDER — MONTELUKAST SODIUM 10 MG/1
10 TABLET ORAL DAILY
Status: DISCONTINUED | OUTPATIENT
Start: 2023-08-16 | End: 2023-08-19 | Stop reason: HOSPADM

## 2023-08-15 RX ORDER — MORPHINE SULFATE 4 MG/ML
4 INJECTION, SOLUTION INTRAMUSCULAR; INTRAVENOUS
Status: COMPLETED | OUTPATIENT
Start: 2023-08-15 | End: 2023-08-15

## 2023-08-15 RX ORDER — ONDANSETRON 2 MG/ML
4 INJECTION INTRAMUSCULAR; INTRAVENOUS
Status: COMPLETED | OUTPATIENT
Start: 2023-08-15 | End: 2023-08-15

## 2023-08-15 RX ORDER — BUDESONIDE AND FORMOTEROL FUMARATE DIHYDRATE 160; 4.5 UG/1; UG/1
2 AEROSOL RESPIRATORY (INHALATION) 2 TIMES DAILY
Status: DISCONTINUED | OUTPATIENT
Start: 2023-08-15 | End: 2023-08-19 | Stop reason: HOSPADM

## 2023-08-15 RX ORDER — SODIUM CHLORIDE 0.9 % (FLUSH) 0.9 %
5-40 SYRINGE (ML) INJECTION PRN
Status: DISCONTINUED | OUTPATIENT
Start: 2023-08-15 | End: 2023-08-19 | Stop reason: HOSPADM

## 2023-08-15 RX ORDER — LANOLIN ALCOHOL/MO/W.PET/CERES
3 CREAM (GRAM) TOPICAL DAILY
Status: ON HOLD | COMMUNITY
End: 2023-08-18 | Stop reason: HOSPADM

## 2023-08-15 RX ORDER — ONDANSETRON 2 MG/ML
4 INJECTION INTRAMUSCULAR; INTRAVENOUS EVERY 6 HOURS PRN
Status: DISCONTINUED | OUTPATIENT
Start: 2023-08-15 | End: 2023-08-19 | Stop reason: HOSPADM

## 2023-08-15 RX ORDER — SODIUM CHLORIDE 9 MG/ML
INJECTION, SOLUTION INTRAVENOUS CONTINUOUS
Status: DISCONTINUED | OUTPATIENT
Start: 2023-08-15 | End: 2023-08-15 | Stop reason: HOSPADM

## 2023-08-15 RX ORDER — OXYMETAZOLINE HYDROCHLORIDE 0.05 G/100ML
2 SPRAY NASAL 2 TIMES DAILY
Status: ON HOLD | COMMUNITY
End: 2023-08-18 | Stop reason: HOSPADM

## 2023-08-15 RX ORDER — ZOLPIDEM TARTRATE 5 MG/1
5 TABLET ORAL NIGHTLY PRN
Status: ON HOLD | COMMUNITY
End: 2023-08-18 | Stop reason: HOSPADM

## 2023-08-15 RX ORDER — SODIUM CHLORIDE 9 MG/ML
INJECTION, SOLUTION INTRAVENOUS PRN
Status: DISCONTINUED | OUTPATIENT
Start: 2023-08-15 | End: 2023-08-19 | Stop reason: HOSPADM

## 2023-08-15 RX ORDER — ACETAMINOPHEN 650 MG/1
650 SUPPOSITORY RECTAL EVERY 6 HOURS PRN
Status: DISCONTINUED | OUTPATIENT
Start: 2023-08-15 | End: 2023-08-15

## 2023-08-15 RX ORDER — CETIRIZINE HYDROCHLORIDE 10 MG/1
10 TABLET ORAL DAILY
Status: DISCONTINUED | OUTPATIENT
Start: 2023-08-16 | End: 2023-08-19 | Stop reason: HOSPADM

## 2023-08-15 RX ORDER — POLYETHYLENE GLYCOL 3350 17 G/17G
17 POWDER, FOR SOLUTION ORAL DAILY PRN
Status: DISCONTINUED | OUTPATIENT
Start: 2023-08-15 | End: 2023-08-19 | Stop reason: HOSPADM

## 2023-08-15 RX ORDER — THYROID 15 MG/1
60 TABLET ORAL DAILY
Status: DISCONTINUED | OUTPATIENT
Start: 2023-08-16 | End: 2023-08-19 | Stop reason: HOSPADM

## 2023-08-15 RX ORDER — MORPHINE SULFATE 2 MG/ML
2 INJECTION, SOLUTION INTRAMUSCULAR; INTRAVENOUS
Status: COMPLETED | OUTPATIENT
Start: 2023-08-15 | End: 2023-08-15

## 2023-08-15 RX ORDER — ZOLPIDEM TARTRATE 5 MG/1
5 TABLET ORAL NIGHTLY PRN
Status: DISCONTINUED | OUTPATIENT
Start: 2023-08-15 | End: 2023-08-19 | Stop reason: HOSPADM

## 2023-08-15 RX ORDER — CHOLECALCIFEROL (VITAMIN D3) 125 MCG
500 CAPSULE ORAL DAILY
Status: DISCONTINUED | OUTPATIENT
Start: 2023-08-16 | End: 2023-08-19 | Stop reason: HOSPADM

## 2023-08-15 RX ORDER — TRAMADOL HYDROCHLORIDE 50 MG/1
50 TABLET ORAL EVERY 6 HOURS PRN
Status: DISCONTINUED | OUTPATIENT
Start: 2023-08-15 | End: 2023-08-19 | Stop reason: HOSPADM

## 2023-08-15 RX ORDER — METOPROLOL SUCCINATE 50 MG/1
50 TABLET, EXTENDED RELEASE ORAL DAILY
Status: DISCONTINUED | OUTPATIENT
Start: 2023-08-16 | End: 2023-08-19 | Stop reason: HOSPADM

## 2023-08-15 RX ADMIN — TRAMADOL HYDROCHLORIDE 50 MG: 50 TABLET ORAL at 16:00

## 2023-08-15 RX ADMIN — MEMANTINE 5 MG: 10 TABLET ORAL at 22:12

## 2023-08-15 RX ADMIN — MORPHINE SULFATE 2 MG: 2 INJECTION, SOLUTION INTRAMUSCULAR; INTRAVENOUS at 06:46

## 2023-08-15 RX ADMIN — SODIUM CHLORIDE, PRESERVATIVE FREE 10 ML: 5 INJECTION INTRAVENOUS at 11:25

## 2023-08-15 RX ADMIN — MEROPENEM 1000 MG: 1 INJECTION, POWDER, FOR SOLUTION INTRAVENOUS at 18:14

## 2023-08-15 RX ADMIN — CYCLOBENZAPRINE 10 MG: 10 TABLET, FILM COATED ORAL at 19:40

## 2023-08-15 RX ADMIN — MORPHINE SULFATE 4 MG: 4 INJECTION, SOLUTION INTRAMUSCULAR; INTRAVENOUS at 08:35

## 2023-08-15 RX ADMIN — BUDESONIDE AND FORMOTEROL FUMARATE DIHYDRATE 2 PUFF: 160; 4.5 AEROSOL RESPIRATORY (INHALATION) at 20:16

## 2023-08-15 RX ADMIN — SODIUM CHLORIDE: 9 INJECTION, SOLUTION INTRAVENOUS at 06:40

## 2023-08-15 RX ADMIN — ONDANSETRON 4 MG: 2 INJECTION INTRAMUSCULAR; INTRAVENOUS at 06:43

## 2023-08-15 RX ADMIN — ACETAMINOPHEN 1000 MG: 500 TABLET ORAL at 11:38

## 2023-08-15 RX ADMIN — SODIUM CHLORIDE, PRESERVATIVE FREE 10 ML: 5 INJECTION INTRAVENOUS at 22:20

## 2023-08-15 ASSESSMENT — PAIN DESCRIPTION - ORIENTATION
ORIENTATION: LEFT
ORIENTATION: LEFT;UPPER
ORIENTATION: LEFT
ORIENTATION: LEFT

## 2023-08-15 ASSESSMENT — PAIN - FUNCTIONAL ASSESSMENT
PAIN_FUNCTIONAL_ASSESSMENT: 0-10

## 2023-08-15 ASSESSMENT — PAIN SCALES - GENERAL
PAINLEVEL_OUTOF10: 4
PAINLEVEL_OUTOF10: 8
PAINLEVEL_OUTOF10: 8
PAINLEVEL_OUTOF10: 0

## 2023-08-15 ASSESSMENT — PAIN DESCRIPTION - PAIN TYPE
TYPE: ACUTE PAIN

## 2023-08-15 ASSESSMENT — PAIN DESCRIPTION - DESCRIPTORS
DESCRIPTORS: ACHING

## 2023-08-15 ASSESSMENT — PAIN DESCRIPTION - LOCATION
LOCATION: HIP
LOCATION: LEG

## 2023-08-15 NOTE — CARE COORDINATION
08/15/23 1350   Service Assessment   Patient Orientation Alert and Oriented   Cognition Short Term Memory Deficit   History Provided By Patient   Primary Caregiver Self   Accompanied By/Relationship Daughter Chasity Kent son. Support Systems Children;Family Members   Patient's Healthcare Decision Maker is: Legal Next of [facility]   PCP Verified by CM Yes  (Satya Mccall - seen @ the facility.)   Last Visit to PCP Within last 3 months   Prior Functional Level Assistance with the following:;Bathing;Dressing; Toileting;Cooking;Housework; Shopping;Mobility  (Uses rollator.)   Current Functional Level Assistance with the following:;Bathing;Dressing; Toileting;Cooking;Housework; Shopping;Mobility  (Uses rollator.)   Can patient return to prior living arrangement Other (see comment)  (D/C Plan is SNF.)   Ability to make needs known: Fair   Family able to assist with home care needs: Yes   Would you like for me to discuss the discharge plan with any other family members/significant others, and if so, who? Yes  (Daughter Rhonda Butterfield.)   Financial Resources [facility] Resources None   Social/Functional History   Lives With Other (comment)  (From [place] at [address] One level   Home Access Level entry   [address] Handicap height   [address] bars in shower;Grab bars around toilet   Bathroom Accessibility Wheelchair accessible   [address] Help From Family; Other (comment)  (Facility staff.)   ADL Assistance Needs assistance   Toileting Needs assistance   Homemaking Assistance Needs assistance   Homemaking Responsibilities Yes   Ambulation Assistance Needs assistance  (Rollator.)   Transfer Assistance Needs assistance   Active  No   Occupation Retired   Discharge Planning   Type of [address] [place] [address]  (Daughter Caitlin Chavez signed Choice Letter for

## 2023-08-15 NOTE — H&P
mmol/L    CO2 26 21 - 32 mmol/L    Anion Gap 9 3.0 - 18.0 mmol/L    Glucose 148 (H) 74 - 99 mg/dL    BUN 20 (H) 7 - 18 mg/dL    Creatinine 0.71 0.60 - 1.30 mg/dL    Bun/Cre Ratio 28 (H) 12 - 20      Est, Glom Filt Rate >60 >60 ml/min/1.73m2    Calcium 8.6 8.5 - 10.1 mg/dL   Protime-INR    Collection Time: 08/15/23  5:27 AM   Result Value Ref Range    Protime 17.4 (H) 11.9 - 14.7 sec    INR 1.4 (H) 0.9 - 1.1     Urinalysis    Collection Time: 08/15/23  6:50 AM   Result Value Ref Range    Color, UA Yellow      Appearance Clear      Specific Gravity, UA 1.015 1.005 - 1.030      pH, Urine 6.0 5.0 - 8.0      Protein, UA Negative Negative mg/dL    Glucose, UA Negative Negative mg/dL    Ketones, Urine 15 (A) Negative mg/dL    Bilirubin Urine Negative Negative      Blood, Urine Negative Negative      Urobilinogen, Urine 0.2 0.2 - 1.0 EU/dL    Nitrite, Urine Positive (A) Negative      Leukocyte Esterase, Urine Large (A) Negative     Urinalysis, Micro    Collection Time: 08/15/23  6:50 AM   Result Value Ref Range    WBC, UA 0-4 0 - 4 /hpf    RBC, UA 0-5 0 - 2 /hpf    Epithelial Cells UA Few 0 - 20 /lpf    BACTERIA, URINE 3+ (A) None /hpf   EKG 12 Lead    Collection Time: 08/15/23  6:59 AM   Result Value Ref Range    Ventricular Rate 78 BPM    QRS Duration 102 ms    Q-T Interval 368 ms    QTc Calculation (Bazett) 419 ms    R Axis 30 degrees    T Axis 55 degrees    Diagnosis       Atrial fibrillation with premature ventricular or aberrantly conducted   complexes  Low voltage QRS  Cannot rule out Anterior infarct , age undetermined  Abnormal ECG  When compared with ECG of 14-DEC-2022 14:36,  PREVIOUS ECG IS PRESENT  Confirmed by Zebedee Angelucci, ALAN ([id number]) on 8/15/2023 9:42:56 AM       BMP:   Lab Results   Component Value Date/Time     08/15/2023 05:27 AM    K 3.9 08/15/2023 05:27 AM     08/15/2023 05:27 AM    CO2 26 08/15/2023 05:27 AM    BUN 20 08/15/2023 05:27 AM    CREATININE 0.71 08/15/2023 05:27 AM    GLUCOSE 148

## 2023-08-15 NOTE — ED NOTES
Attempted to insert sanders catheter with no success, purewick implemented with success. Dr Brooks Pierce has spoken with Dr Cris Hicks at this time.      Louie Mijares RN  08/15/23 4306

## 2023-08-15 NOTE — CONSULTS
issues discussed today, and wishes to proceed with the proposed treatment. Verbal informed consent was obtained. Plan will be for surgery tomorrow morning at 0900. Cardiology has already been consulted. Urinalysis with large leukocyte Esterase. Will need to treat this today but plan to proceed with surgery as soon as possible.       Signed By: Natasha Anthony MD     August 15, 2023

## 2023-08-15 NOTE — ED NOTES
Dr Shana Pineda speaking with ER physician at [facility] at this time      Bedside and Verbal shift change report given to AVA King RN (oncoming nurse) by Eduin Jane (offgoing nurse). Report included the following information Nurse Handoff Report, ED Encounter Summary, and Med Rec Status.        Roosevelt Oscar RN  08/15/23 3935 Quality 110: Preventive Care And Screening: Influenza Immunization: Influenza Immunization previously received during influenza season Detail Level: Detailed Quality 226: Preventive Care And Screening: Tobacco Use: Screening And Cessation Intervention: Patient screened for tobacco use and is an ex/non-smoker Quality 130: Documentation Of Current Medications In The Medical Record: Current Medications Documented Quality 111:Pneumonia Vaccination Status For Older Adults: Pneumococcal Vaccination Previously Received

## 2023-08-15 NOTE — ED NOTES
Transfer process initiated with transfer center     TIP GONZALES JR. [facility]  08/15/23 3261

## 2023-08-15 NOTE — ED TRIAGE NOTES
Received patient via EMS with pain in left hip. Is resident of [facility], reports fell in bathroom last night per EMS staff assisted back to bed. This morning woke up with pain in left hip.  CMS intact

## 2023-08-15 NOTE — ED NOTES
Patient left via [facility] en route to [facility]. Daughter present for departure. Patient medicated with morphine 4mg IVP prior to leaving.       Felicity Gama RN  08/15/23 6095

## 2023-08-15 NOTE — ED NOTES
Per daughter, pt last had her xaralto dose at 9 pm last night     Delicia Jack, CHASITY  08/15/23 1122

## 2023-08-15 NOTE — ED PROVIDER NOTES
[facility] EMERGENCY DEPT  EMERGENCY DEPARTMENT ENCOUNTER       Pt Name: Lino Yuan  MRN: [id number]  [address] [facility] 1934  Date of evaluation: 8/15/2023  Provider: Larisa Fuentes MD   PCP: Dev Burton MD  Note Started: 6:10 AM 8/15/23     CHIEF COMPLAINT       Chief Complaint   Patient presents with    Fall    Hip Pain        HISTORY OF PRESENT ILLNESS: 1 or more elements      History From: EMS  History limited by: Dementia     Lino Yuan is a 80 y.o. female who presents to the ED via EMS after a fall at home, resident of [address] [place] [address], reportedly fell in the bathroom 11 PM last night, was put back in bed but woke up this morning in pain. She complains of pain in her left hip. She has multiple comorbidities including A-fib(on Xarelto), CHF, asthma, dementia, hypertension, broke her right hip 12/22 and had surgery at [facility]. She is DNR but family wants hip fixed. Patient has been ambulatory. She is scheduled for Watchman procedure next month. Nursing Notes were all reviewed and agreed with or any disagreements were addressed in the HPI. REVIEW OF SYSTEMS      Review of Systems     Positives and Pertinent negatives as per HPI. PAST HISTORY     Past Medical History:  Past Medical History:   Diagnosis Date    Anxiety     Arthropathy     Asthma     Atrial fibrillation (HCC)     CHF (congestive heart failure) ([address] )     Community acquired pneumonia     Dementia ([address] )     Gout     Hypertension     Ill-defined condition     dementia    Thyroid disease        Past Surgical History:  Past Surgical History:   Procedure Laterality Date    ORTHOPEDIC SURGERY      left ankle orif    TONSILLECTOMY         Family History:  History reviewed. No pertinent family history. Social History:  Social History     Tobacco Use    Smoking status: Never    Smokeless tobacco: Never   Substance Use Topics    Alcohol use: Never    Drug use: Never       Allergies:   Allergies   Allergen Reactions    Ciprofloxacin

## 2023-08-15 NOTE — ED PROVIDER NOTES
[facility] EMERGENCY DEPT  EMERGENCY DEPARTMENT HISTORY AND PHYSICAL EXAM      Date: 8/15/2023  Patient Name: Wing Lucas  MRN: [id number]  [address] [place] 1934  Date of evaluation: 8/15/2023  Provider: Gela Pedraza MD   Note Started: 10:02 AM EDT 8/15/23    HISTORY OF PRESENT ILLNESS     Chief Complaint   Patient presents with    Fall       History Provided By: Jyoti Null    HPI: Wing Lucas is a 80 y.o. female with a history of asthma, atrial fibrillation, CHF, CAD, dementia, right hip fracture presenting with left hip fracture from outside facility. Per report, patient had a ground-level fall today and complained of left hip pain, x-rays and CT, confirmed a left femoral neck fracture. Orthopedics at [facility] stated that due to her comorbidities, they did not feel comfortable operating on her at that facility. Transferred here by family request.  Was given 2 of morphine prior to leaving the facility. No issues in route per EMS    PAST MEDICAL HISTORY   Past Medical History:  Past Medical History:   Diagnosis Date    Anxiety     Arthropathy     Asthma     Atrial fibrillation (HCC)     CHF (congestive heart failure) ([address] [facility])     Community acquired pneumonia     Dementia ([address] [facility])     Gout     Hypertension     Ill-defined condition     dementia    Thyroid disease        Past Surgical History:  Past Surgical History:   Procedure Laterality Date    ORTHOPEDIC SURGERY      left ankle orif    TONSILLECTOMY         Family History:  No family history on file. Social History:  Social History     Tobacco Use    Smoking status: Never    Smokeless tobacco: Never   Substance Use Topics    Alcohol use: Never    Drug use: Never       Allergies:   Allergies   Allergen Reactions    Ciprofloxacin Hcl Other (See Comments)    Meloxicam Other (See Comments)    Penicillin G Other (See Comments)    Shrimp Extract Allergy Skin Test Other (See Comments)    Telithromycin Other (See Comments)       PCP: Dayana Lara

## 2023-08-15 NOTE — ED NOTES
TRANSFER - OUT REPORT:    Verbal report given to Ky Roblero RN on [address]  being transferred to [facility] for routine progression of patient care       Report consisted of patient's Situation, Background, Assessment and   Recommendations(SBAR). Information from the following report(s) ED Encounter Summary, ED SBAR, and Recent Results was reviewed with the receiving nurse. [place] Fall Assessment:    Presents to emergency department  because of falls (Syncope, seizure, or loss of consciousness): Yes  Age > 79: Yes  Altered Mental Status, Intoxication with alcohol or substance confusion (Disorientation, impaired judgment, poor safety awaremess, or inability to follow instructions): No  Impaired Mobility: Ambulates or transfers with assistive devices or assistance; Unable to ambulate or transer.: Yes  Nursing Judgement: No          Lines:   Peripheral IV 08/15/23 Left Antecubital (Active)        Opportunity for questions and clarification was provided.       Patient transported with:  Monitor and O2 @ 2lpm          Cassi Butts RN  08/15/23 9913

## 2023-08-16 ENCOUNTER — ANESTHESIA (OUTPATIENT)
Facility: HOSPITAL | Age: 88
DRG: 522 | End: 2023-08-16
Payer: MEDICARE

## 2023-08-16 ENCOUNTER — ANESTHESIA EVENT (OUTPATIENT)
Facility: HOSPITAL | Age: 88
DRG: 522 | End: 2023-08-16
Payer: MEDICARE

## 2023-08-16 LAB
ALBUMIN SERPL-MCNC: 3.2 G/DL (ref 3.5–5)
ALBUMIN/GLOB SERPL: 0.9 (ref 1.1–2.2)
ALP SERPL-CCNC: 73 U/L (ref 45–117)
ALT SERPL-CCNC: 17 U/L (ref 12–78)
ANION GAP SERPL CALC-SCNC: 4 MMOL/L (ref 5–15)
AST SERPL W P-5'-P-CCNC: 14 U/L (ref 15–37)
BASOPHILS # BLD: 0 K/UL (ref 0–0.1)
BASOPHILS NFR BLD: 0 % (ref 0–1)
BILIRUB SERPL-MCNC: 0.7 MG/DL (ref 0.2–1)
BUN SERPL-MCNC: 21 MG/DL (ref 6–20)
BUN/CREAT SERPL: 23 (ref 12–20)
CA-I BLD-MCNC: 8.6 MG/DL (ref 8.5–10.1)
CHLORIDE SERPL-SCNC: 104 MMOL/L (ref 97–108)
CO2 SERPL-SCNC: 29 MMOL/L (ref 21–32)
CREAT SERPL-MCNC: 0.92 MG/DL (ref 0.55–1.02)
DIFFERENTIAL METHOD BLD: ABNORMAL
EKG ATRIAL RATE: 102 BPM
EKG DIAGNOSIS: NORMAL
EKG Q-T INTERVAL: 372 MS
EKG QRS DURATION: 102 MS
EKG QTC CALCULATION (BAZETT): 432 MS
EKG R AXIS: 32 DEGREES
EKG T AXIS: 63 DEGREES
EKG VENTRICULAR RATE: 81 BPM
EOSINOPHIL # BLD: 0.2 K/UL (ref 0–0.4)
EOSINOPHIL NFR BLD: 2 % (ref 0–7)
ERYTHROCYTE [DISTWIDTH] IN BLOOD BY AUTOMATED COUNT: 15.9 % (ref 11.5–14.5)
GLOBULIN SER CALC-MCNC: 3.4 G/DL (ref 2–4)
GLUCOSE SERPL-MCNC: 128 MG/DL (ref 65–100)
HCT VFR BLD AUTO: 36 % (ref 35–47)
HGB BLD-MCNC: 11.5 G/DL (ref 11.5–16)
IMM GRANULOCYTES # BLD AUTO: 0 K/UL (ref 0–0.04)
IMM GRANULOCYTES NFR BLD AUTO: 0 % (ref 0–0.5)
LYMPHOCYTES # BLD: 1.1 K/UL (ref 0.8–3.5)
LYMPHOCYTES NFR BLD: 12 % (ref 12–49)
MCH RBC QN AUTO: 27.3 PG (ref 26–34)
MCHC RBC AUTO-ENTMCNC: 31.9 G/DL (ref 30–36.5)
MCV RBC AUTO: 85.5 FL (ref 80–99)
MONOCYTES # BLD: 0.7 K/UL (ref 0–1)
MONOCYTES NFR BLD: 7 % (ref 5–13)
NEUTS SEG # BLD: 7.2 K/UL (ref 1.8–8)
NEUTS SEG NFR BLD: 79 % (ref 32–75)
NRBC # BLD: 0 K/UL (ref 0–0.01)
NRBC BLD-RTO: 0 PER 100 WBC
PLATELET # BLD AUTO: 170 K/UL (ref 150–400)
PMV BLD AUTO: 9.9 FL (ref 8.9–12.9)
POTASSIUM SERPL-SCNC: 4.2 MMOL/L (ref 3.5–5.1)
PROT SERPL-MCNC: 6.6 G/DL (ref 6.4–8.2)
RBC # BLD AUTO: 4.21 M/UL (ref 3.8–5.2)
SODIUM SERPL-SCNC: 137 MMOL/L (ref 136–145)
WBC # BLD AUTO: 9.2 K/UL (ref 3.6–11)

## 2023-08-16 PROCEDURE — 6370000000 HC RX 637 (ALT 250 FOR IP): Performed by: INTERNAL MEDICINE

## 2023-08-16 PROCEDURE — 1100000000 HC RM PRIVATE

## 2023-08-16 PROCEDURE — 36415 COLL VENOUS BLD VENIPUNCTURE: CPT

## 2023-08-16 PROCEDURE — 6360000002 HC RX W HCPCS: Performed by: INTERNAL MEDICINE

## 2023-08-16 PROCEDURE — 80053 COMPREHEN METABOLIC PANEL: CPT

## 2023-08-16 PROCEDURE — 2700000000 HC OXYGEN THERAPY PER DAY

## 2023-08-16 PROCEDURE — 2580000003 HC RX 258: Performed by: INTERNAL MEDICINE

## 2023-08-16 PROCEDURE — 94640 AIRWAY INHALATION TREATMENT: CPT

## 2023-08-16 PROCEDURE — 85025 COMPLETE CBC W/AUTO DIFF WBC: CPT

## 2023-08-16 PROCEDURE — 6370000000 HC RX 637 (ALT 250 FOR IP): Performed by: PHYSICIAN ASSISTANT

## 2023-08-16 RX ADMIN — SODIUM CHLORIDE, PRESERVATIVE FREE 10 ML: 5 INJECTION INTRAVENOUS at 21:54

## 2023-08-16 RX ADMIN — MEROPENEM 1000 MG: 1 INJECTION, POWDER, FOR SOLUTION INTRAVENOUS at 16:46

## 2023-08-16 RX ADMIN — BUDESONIDE AND FORMOTEROL FUMARATE DIHYDRATE 2 PUFF: 160; 4.5 AEROSOL RESPIRATORY (INHALATION) at 08:44

## 2023-08-16 RX ADMIN — MEROPENEM 1000 MG: 1 INJECTION, POWDER, FOR SOLUTION INTRAVENOUS at 10:51

## 2023-08-16 RX ADMIN — SODIUM CHLORIDE, PRESERVATIVE FREE 10 ML: 5 INJECTION INTRAVENOUS at 20:27

## 2023-08-16 RX ADMIN — MEMANTINE 5 MG: 10 TABLET ORAL at 20:23

## 2023-08-16 RX ADMIN — SERTRALINE HYDROCHLORIDE 50 MG: 50 TABLET ORAL at 10:49

## 2023-08-16 RX ADMIN — MEROPENEM 1000 MG: 1 INJECTION, POWDER, FOR SOLUTION INTRAVENOUS at 01:02

## 2023-08-16 RX ADMIN — LEVOTHYROXINE, LIOTHYRONINE 60 MG: 9.5; 2.25 TABLET ORAL at 10:49

## 2023-08-16 RX ADMIN — SODIUM CHLORIDE, PRESERVATIVE FREE 10 ML: 5 INJECTION INTRAVENOUS at 10:50

## 2023-08-16 RX ADMIN — CETIRIZINE HYDROCHLORIDE 10 MG: 10 TABLET, FILM COATED ORAL at 10:49

## 2023-08-16 RX ADMIN — CYANOCOBALAMIN TAB 500 MCG 500 MCG: 500 TAB at 10:56

## 2023-08-16 RX ADMIN — MEMANTINE 5 MG: 10 TABLET ORAL at 10:56

## 2023-08-16 RX ADMIN — TRAMADOL HYDROCHLORIDE 50 MG: 50 TABLET ORAL at 05:34

## 2023-08-16 RX ADMIN — MONTELUKAST 10 MG: 10 TABLET, FILM COATED ORAL at 10:48

## 2023-08-16 RX ADMIN — METOPROLOL SUCCINATE 50 MG: 50 TABLET, EXTENDED RELEASE ORAL at 10:48

## 2023-08-16 RX ADMIN — BUDESONIDE AND FORMOTEROL FUMARATE DIHYDRATE 2 PUFF: 160; 4.5 AEROSOL RESPIRATORY (INHALATION) at 19:02

## 2023-08-16 ASSESSMENT — PAIN DESCRIPTION - LOCATION: LOCATION: HIP

## 2023-08-16 ASSESSMENT — PAIN SCALES - GENERAL
PAINLEVEL_OUTOF10: 8
PAINLEVEL_OUTOF10: 4

## 2023-08-16 ASSESSMENT — PAIN DESCRIPTION - ORIENTATION: ORIENTATION: LEFT

## 2023-08-16 ASSESSMENT — PAIN DESCRIPTION - DESCRIPTORS: DESCRIPTORS: ACHING

## 2023-08-16 NOTE — PLAN OF CARE
Problem: Discharge Planning  Goal: Discharge to home or other facility with appropriate resources  Outcome: Progressing  Flowsheets (Taken 8/15/2023 2134)  Discharge to home or other facility with appropriate resources:   Identify barriers to discharge with patient and caregiver   Arrange for needed discharge resources and transportation as appropriate     Problem: Safety - Adult  Goal: Free from fall injury  Outcome: Progressing  Flowsheets (Taken 8/15/2023 2134)  Free From Fall Injury: Instruct family/caregiver on patient safety     Problem: Pain  Goal: Verbalizes/displays adequate comfort level or baseline comfort level  Outcome: Progressing     Problem: Skin/Tissue Integrity  Goal: Absence of new skin breakdown  Description: 1. Monitor for areas of redness and/or skin breakdown  2. Assess vascular access sites hourly  3. Every 4-6 hours minimum:  Change oxygen saturation probe site  4. Every 4-6 hours:  If on nasal continuous positive airway pressure, respiratory therapy assess nares and determine need for appliance change or resting period.   Outcome: Progressing     Problem: ABCDS Injury Assessment  Goal: Absence of physical injury  Outcome: Progressing

## 2023-08-16 NOTE — CARE COORDINATION
CM reviewed Pt medicals, Pt to the OR for surgery today. A referral has been sent to [facility] for rehab once Pt is D/C. Will send updated PT/OT notes once they become available.

## 2023-08-16 NOTE — PROGRESS NOTES
08/16/2023 05:19 AM    RBC 4.21 08/16/2023 05:19 AM    HGB 11.5 08/16/2023 05:19 AM    HCT 36.0 08/16/2023 05:19 AM    MCV 85.5 08/16/2023 05:19 AM    MCH 27.3 08/16/2023 05:19 AM    MCHC 31.9 08/16/2023 05:19 AM    RDW 15.9 08/16/2023 05:19 AM     08/16/2023 05:19 AM    MPV 9.9 08/16/2023 05:19 AM        Last 3 CBC:   Recent Labs     08/15/23  0527 08/16/23  0519   WBC 11.8 9.2   RBC 4.30 4.21   HGB 11.6* 11.5   HCT 36.8 36.0   MCV 85.6 85.5   MCH 27.0 27.3   MCHC 31.5 31.9   RDW 15.7* 15.9*    170   MPV 10.2 9.9        WBC:   Lab Results   Component Value Date/Time    WBC 9.2 08/16/2023 05:19 AM        Last 3 WBC:   Recent Labs     08/15/23  0527 08/16/23  0519   WBC 11.8 9.2        Platelets:   Lab Results   Component Value Date/Time     08/16/2023 05:19 AM        Last 3 Platelets:   Recent Labs     08/16/23  0519           Hemoglobin/Hematocrit:   Lab Results   Component Value Date/Time    HGB 11.5 08/16/2023 05:19 AM    HCT 36.0 08/16/2023 05:19 AM        Last 3 Hemoglobin/Hematocrit:   Recent Labs     08/15/23  0527 08/16/23  0519   HGB 11.6* 11.5   HCT 36.8 36.0        Hepatic Function Panel:   Lab Results   Component Value Date/Time    ALKPHOS 73 08/16/2023 05:19 AM    ALKPHOS 71 01/04/2023 08:24 AM    ALT 17 08/16/2023 05:19 AM    AST 14 08/16/2023 05:19 AM    PROT 6.6 08/16/2023 05:19 AM    BILITOT 0.7 08/16/2023 05:19 AM    LABALBU 3.2 08/16/2023 05:19 AM        Last 3 Hepatic Function Panel:   Recent Labs     08/16/23  0519   ALKPHOS 73   ALT 17   AST 14*   PROT 6.6   BILITOT 0.7   LABALBU 3.2*        Albumin:   Lab Results   Component Value Date/Time    LABALBU 3.2 08/16/2023 05:19 AM        Calcium:   Lab Results   Component Value Date/Time    CALCIUM 8.6 08/16/2023 05:19 AM        Ionized Calcium: No results found for: IONCA     Magnesium:   Lab Results   Component Value Date/Time    MG 2.1 12/30/2022 02:43 AM        ABGs: No results found for: PHART, PO2ART, [id number], Mild to moderate regurgitation with jet direction toward the septum. The estimated RVSP is 73 mmHg. Left Atrium: Left atrium is severely dilated. Left atrial volume index is severely increased (>48 mL/m2). LA Vol Index is  67 ml/m2. Contrast used: Definity.     8/16/23 no new complaints, tolerating medications well  Operative management anticipated this morning    MICROBIOLOGY    8/15/23 Urine  Pending    ASSESSMENT AND PLAN    1) Left femur fracture                 Supportive care              Pain management              Operative management per orthopedics     2) Cardiovascular issues including HFrEF, chronic atrial fibrillation on rivaroxaban                 Telemetry monitoring              Echocardiogram as above              Medical management                          Metoprolol                          Rivaroxaban - held preop     3) COPD without acute exacerbation                 Supportive care with respiratory support as needed              Bronchodilators     4) Urinary infection at admission                 Meropenem for now pending further culture data     5) Dementia continue outpatient regimen     6) Hypothyroidism on levothyroxine     7) DVT prophylaxis              Resume rivaroxaban after surgery    Code status: DNR    Social determinants of health: none    Estimated discharge date//time frame/disposition: 1-2 days    Barriers to discharge: clinical improvement    Total time spent with patient: 45 mins    Janet West MD

## 2023-08-16 NOTE — ANESTHESIA PRE PROCEDURE
Department of Anesthesiology  Preprocedure Note       Name:  [facility]   Age:  80 y.o.  :  1934                                          MRN:  [id number]         Date:  2023      Surgeon: Tiffanie Friedman):  Inga Hernandez MD    Procedure: Procedure(s):  HIP HEMIARTHROPLASTY ON LEFT    Medications prior to admission:   Prior to Admission medications    Medication Sig Start Date End Date Taking? Authorizing Provider   melatonin 3 MG TABS tablet Take 1 tablet by mouth daily 2 tabs    Historical Provider, MD   sodium chloride (OCEAN, BABY AYR) 0.65 % nasal spray 1 spray by Nasal route as needed for Congestion    Historical Provider, MD   oxymetazoline (12 HOUR NASAL DECONGESTANT) 0.05 % nasal spray 2 sprays by Nasal route 2 times daily    Historical Provider, MD   zolpidem (AMBIEN) 5 MG tablet Take 1 tablet by mouth nightly as needed for Sleep.  Max Daily Amount: 5 mg    Historical Provider, MD   Cranberry-Cholecalciferol 4200-500 MG-UNIT CAPS Take by mouth    Historical Provider, MD   LACTOBACILLUS PROBIOTIC PO Take by mouth    Historical Provider, MD   vitamin B-12 (CYANOCOBALAMIN) 500 MCG tablet Take 1 tablet by mouth daily    Historical Provider, MD   rivaroxaban (XARELTO) 15 MG TABS tablet Take 1 tablet by mouth    Historical Provider, MD   Dextromethorphan-guaiFENesin (TUSSIN DM PO) Take by mouth    Historical Provider, MD   acetaminophen (TYLENOL) 500 MG tablet Take 2 tablets by mouth every 8 hours as needed    Ar Automatic Reconciliation   albuterol sulfate HFA (PROVENTIL;VENTOLIN;PROAIR) 108 (90 Base) MCG/ACT inhaler Inhale 1-2 puffs into the lungs every 4 hours as needed 22   Ar Automatic Reconciliation   albuterol (PROVENTIL) (2.5 MG/3ML) 0.083% nebulizer solution Inhale 3 mLs into the lungs every 4 hours as needed 22   Ar Automatic Reconciliation   allopurinol (ZYLOPRIM) 300 MG tablet Take 1 tablet by mouth daily    Ar Automatic Reconciliation   budesonide-formoterol (SYMBICORT)

## 2023-08-16 NOTE — PLAN OF CARE
Problem: Discharge Planning  Goal: Discharge to home or other facility with appropriate resources  Outcome: Progressing  Flowsheets (Taken 8/16/2023 5097)  Discharge to home or other facility with appropriate resources: Identify barriers to discharge with patient and caregiver     Problem: Safety - Adult  Goal: Free from fall injury  Outcome: Progressing     Problem: Pain  Goal: Verbalizes/displays adequate comfort level or baseline comfort level  Outcome: Progressing

## 2023-08-16 NOTE — PROGRESS NOTES
CARDIOLOGY PROGRESS NOTE      Patient Name: Fazal Calix  Age: 80 y.o. Gender:female  :1934  MRN: [id number]    Patient seen and examined. This is a patient with a history of atrial fibrillation, heart failure with mildly reduced EF, hypertension, asthma who presented with hip fracture now being followed for preoperative risk assessment. Resting in bed, family at the bedside. She denies chest pain or shortness of breath. No other complaints reported. Telemetry reviewed, there were no events noted in the past 24 hours. Pertinent review of systems items noted above, all other systems are negative. Current medications reviewed. Physical Examination    Allergies   Allergen Reactions    Ciprofloxacin Hcl Other (See Comments)    Meloxicam Other (See Comments)    Penicillin G Other (See Comments)    Shrimp Extract Allergy Skin Test Other (See Comments)    Telithromycin Other (See Comments)     Vitals:    23 0853   BP:    Pulse:    Resp:    Temp:    SpO2: 95%     Vital signs are stable  No apparent distress. Heart has an irregularly irregular rate and rhythm.  + murmur  Lungs are clear  Abdomen is soft, nontender, normal bowel sounds. Extremities have trace edema  Skin is dry and warm.   Normal affect, +Douglas    Labs reviewed:  Recent Results (from the past 12 hour(s))   Comprehensive Metabolic Panel w/ Reflex to MG    Collection Time: 23  5:19 AM   Result Value Ref Range    Sodium 137 136 - 145 mmol/L    Potassium 4.2 3.5 - 5.1 mmol/L    Chloride 104 97 - 108 mmol/L    CO2 29 21 - 32 mmol/L    Anion Gap 4 (L) 5 - 15 mmol/L    Glucose 128 (H) 65 - 100 mg/dL    BUN 21 (H) 6 - 20 mg/dL    Creatinine 0.92 0.55 - 1.02 mg/dL    Bun/Cre Ratio 23 (H) 12 - 20      Est, Glom Filt Rate 60 (L) >60 ml/min/1.73m2    Calcium 8.6 8.5 - 10.1 mg/dL    Total Bilirubin 0.7 0.2 - 1.0 mg/dL    AST 14 (L) 15 - 37 U/L    ALT 17 12 - 78 U/L    Alk Phosphatase 73 45 - 117 U/L    Total Protein 6.6 6.4 - 8.2 g/dL

## 2023-08-16 NOTE — PROGRESS NOTES
PT/OT eval order received and acknowledged. Per medical records pt is undergoing left hemiarthroplasty surgery for left femur fracture on 8/16/23. Current order completed at this time. Will require new PT/OT orders following procedure with updated WB status. Thank you.

## 2023-08-17 ENCOUNTER — ANESTHESIA (OUTPATIENT)
Facility: HOSPITAL | Age: 88
DRG: 522 | End: 2023-08-17
Payer: MEDICARE

## 2023-08-17 ENCOUNTER — ANESTHESIA EVENT (OUTPATIENT)
Facility: HOSPITAL | Age: 88
DRG: 522 | End: 2023-08-17
Payer: MEDICARE

## 2023-08-17 ENCOUNTER — APPOINTMENT (OUTPATIENT)
Facility: HOSPITAL | Age: 88
DRG: 522 | End: 2023-08-17
Payer: MEDICARE

## 2023-08-17 LAB
ALBUMIN SERPL-MCNC: 2.6 G/DL (ref 3.5–5)
ALBUMIN/GLOB SERPL: 0.7 (ref 1.1–2.2)
ALP SERPL-CCNC: 69 U/L (ref 45–117)
ALT SERPL-CCNC: 16 U/L (ref 12–78)
ANION GAP SERPL CALC-SCNC: 6 MMOL/L (ref 5–15)
AST SERPL W P-5'-P-CCNC: 15 U/L (ref 15–37)
BASOPHILS # BLD: 0 K/UL (ref 0–0.1)
BASOPHILS NFR BLD: 0 % (ref 0–1)
BILIRUB SERPL-MCNC: 0.9 MG/DL (ref 0.2–1)
BUN SERPL-MCNC: 26 MG/DL (ref 6–20)
BUN/CREAT SERPL: 30 (ref 12–20)
CA-I BLD-MCNC: 8.4 MG/DL (ref 8.5–10.1)
CHLORIDE SERPL-SCNC: 99 MMOL/L (ref 97–108)
CO2 SERPL-SCNC: 26 MMOL/L (ref 21–32)
CREAT SERPL-MCNC: 0.88 MG/DL (ref 0.55–1.02)
DIFFERENTIAL METHOD BLD: ABNORMAL
EOSINOPHIL # BLD: 0 K/UL (ref 0–0.4)
EOSINOPHIL NFR BLD: 0 % (ref 0–7)
ERYTHROCYTE [DISTWIDTH] IN BLOOD BY AUTOMATED COUNT: 15.7 % (ref 11.5–14.5)
GLOBULIN SER CALC-MCNC: 3.7 G/DL (ref 2–4)
GLUCOSE SERPL-MCNC: 142 MG/DL (ref 65–100)
HCT VFR BLD AUTO: 35.9 % (ref 35–47)
HGB BLD-MCNC: 11.2 G/DL (ref 11.5–16)
IMM GRANULOCYTES # BLD AUTO: 0 K/UL (ref 0–0.04)
IMM GRANULOCYTES NFR BLD AUTO: 0 % (ref 0–0.5)
LYMPHOCYTES # BLD: 1.2 K/UL (ref 0.8–3.5)
LYMPHOCYTES NFR BLD: 12 % (ref 12–49)
MCH RBC QN AUTO: 27 PG (ref 26–34)
MCHC RBC AUTO-ENTMCNC: 31.2 G/DL (ref 30–36.5)
MCV RBC AUTO: 86.5 FL (ref 80–99)
MONOCYTES # BLD: 0.8 K/UL (ref 0–1)
MONOCYTES NFR BLD: 9 % (ref 5–13)
NEUTS SEG # BLD: 7.7 K/UL (ref 1.8–8)
NEUTS SEG NFR BLD: 79 % (ref 32–75)
NRBC # BLD: 0 K/UL (ref 0–0.01)
NRBC BLD-RTO: 0 PER 100 WBC
PLATELET # BLD AUTO: 144 K/UL (ref 150–400)
PMV BLD AUTO: 9.4 FL (ref 8.9–12.9)
POTASSIUM SERPL-SCNC: 4.3 MMOL/L (ref 3.5–5.1)
PROT SERPL-MCNC: 6.3 G/DL (ref 6.4–8.2)
RBC # BLD AUTO: 4.15 M/UL (ref 3.8–5.2)
SODIUM SERPL-SCNC: 131 MMOL/L (ref 136–145)
WBC # BLD AUTO: 9.8 K/UL (ref 3.6–11)

## 2023-08-17 PROCEDURE — 6360000002 HC RX W HCPCS: Performed by: INTERNAL MEDICINE

## 2023-08-17 PROCEDURE — C1713 ANCHOR/SCREW BN/BN,TIS/BN: HCPCS | Performed by: ORTHOPAEDIC SURGERY

## 2023-08-17 PROCEDURE — 6370000000 HC RX 637 (ALT 250 FOR IP): Performed by: ORTHOPAEDIC SURGERY

## 2023-08-17 PROCEDURE — 6360000002 HC RX W HCPCS: Performed by: ORTHOPAEDIC SURGERY

## 2023-08-17 PROCEDURE — 2580000003 HC RX 258: Performed by: INTERNAL MEDICINE

## 2023-08-17 PROCEDURE — 3700000001 HC ADD 15 MINUTES (ANESTHESIA): Performed by: ORTHOPAEDIC SURGERY

## 2023-08-17 PROCEDURE — 2580000003 HC RX 258: Performed by: NURSE ANESTHETIST, CERTIFIED REGISTERED

## 2023-08-17 PROCEDURE — 2709999900 HC NON-CHARGEABLE SUPPLY: Performed by: ORTHOPAEDIC SURGERY

## 2023-08-17 PROCEDURE — 72170 X-RAY EXAM OF PELVIS: CPT

## 2023-08-17 PROCEDURE — 85025 COMPLETE CBC W/AUTO DIFF WBC: CPT

## 2023-08-17 PROCEDURE — 0SRS0J9 REPLACEMENT OF LEFT HIP JOINT, FEMORAL SURFACE WITH SYNTHETIC SUBSTITUTE, CEMENTED, OPEN APPROACH: ICD-10-PCS | Performed by: ORTHOPAEDIC SURGERY

## 2023-08-17 PROCEDURE — 80053 COMPREHEN METABOLIC PANEL: CPT

## 2023-08-17 PROCEDURE — 2580000003 HC RX 258: Performed by: ORTHOPAEDIC SURGERY

## 2023-08-17 PROCEDURE — 2720000010 HC SURG SUPPLY STERILE: Performed by: ORTHOPAEDIC SURGERY

## 2023-08-17 PROCEDURE — 1100000000 HC RM PRIVATE

## 2023-08-17 PROCEDURE — 3700000000 HC ANESTHESIA ATTENDED CARE: Performed by: ORTHOPAEDIC SURGERY

## 2023-08-17 PROCEDURE — 6360000002 HC RX W HCPCS: Performed by: NURSE ANESTHETIST, CERTIFIED REGISTERED

## 2023-08-17 PROCEDURE — 7100000000 HC PACU RECOVERY - FIRST 15 MIN: Performed by: ORTHOPAEDIC SURGERY

## 2023-08-17 PROCEDURE — 3600000005 HC SURGERY LEVEL 5 BASE: Performed by: ORTHOPAEDIC SURGERY

## 2023-08-17 PROCEDURE — 2500000003 HC RX 250 WO HCPCS: Performed by: NURSE ANESTHETIST, CERTIFIED REGISTERED

## 2023-08-17 PROCEDURE — 7100000001 HC PACU RECOVERY - ADDTL 15 MIN: Performed by: ORTHOPAEDIC SURGERY

## 2023-08-17 PROCEDURE — 94640 AIRWAY INHALATION TREATMENT: CPT

## 2023-08-17 PROCEDURE — 6360000002 HC RX W HCPCS: Performed by: ANESTHESIOLOGY

## 2023-08-17 PROCEDURE — 6370000000 HC RX 637 (ALT 250 FOR IP): Performed by: INTERNAL MEDICINE

## 2023-08-17 PROCEDURE — 36415 COLL VENOUS BLD VENIPUNCTURE: CPT

## 2023-08-17 PROCEDURE — 3600000015 HC SURGERY LEVEL 5 ADDTL 15MIN: Performed by: ORTHOPAEDIC SURGERY

## 2023-08-17 PROCEDURE — 94761 N-INVAS EAR/PLS OXIMETRY MLT: CPT

## 2023-08-17 PROCEDURE — C1776 JOINT DEVICE (IMPLANTABLE): HCPCS | Performed by: ORTHOPAEDIC SURGERY

## 2023-08-17 DEVICE — IMPL CAPPED H9 CEM-STEM DEPUYSYNTHES: Type: IMPLANTABLE DEVICE | Site: HIP | Status: FUNCTIONAL

## 2023-08-17 DEVICE — CENTRALIZER STEM SZ 5 L120MM DIA13MM DST FEM HIP CEM MOLD: Type: IMPLANTABLE DEVICE | Site: HIP | Status: FUNCTIONAL

## 2023-08-17 DEVICE — IMPL CAPPED H7 BIPOLAR LINER AND HEAD DEPUYSYNTHES: Type: IMPLANTABLE DEVICE | Site: HIP | Status: FUNCTIONAL

## 2023-08-17 DEVICE — HEAD FEM DIA28MM NK L+1.5MM HIP MTL ON MTL 12/14 TAPR: Type: IMPLANTABLE DEVICE | Site: HIP | Status: FUNCTIONAL

## 2023-08-17 DEVICE — CEMENT BNE 40 GM RADIOPAQUE BA SIMPLEX P: Type: IMPLANTABLE DEVICE | Site: HIP | Status: FUNCTIONAL

## 2023-08-17 DEVICE — HEAD BPLR OD47MM ID28MM FEM HIP BRN POS ECC SELF CNTR: Type: IMPLANTABLE DEVICE | Site: HIP | Status: FUNCTIONAL

## 2023-08-17 DEVICE — STEM FEM SZ 5 L120MM NK L34MM 40MM OFFSET 130DEG 12/14 TAPR: Type: IMPLANTABLE DEVICE | Site: HIP | Status: FUNCTIONAL

## 2023-08-17 RX ORDER — ONDANSETRON 2 MG/ML
4 INJECTION INTRAMUSCULAR; INTRAVENOUS
Status: DISCONTINUED | OUTPATIENT
Start: 2023-08-17 | End: 2023-08-17

## 2023-08-17 RX ORDER — VANCOMYCIN HYDROCHLORIDE 1 G/20ML
INJECTION, POWDER, LYOPHILIZED, FOR SOLUTION INTRAVENOUS PRN
Status: DISCONTINUED | OUTPATIENT
Start: 2023-08-17 | End: 2023-08-17 | Stop reason: ALTCHOICE

## 2023-08-17 RX ORDER — KETOROLAC TROMETHAMINE 30 MG/ML
INJECTION, SOLUTION INTRAMUSCULAR; INTRAVENOUS PRN
Status: DISCONTINUED | OUTPATIENT
Start: 2023-08-17 | End: 2023-08-17 | Stop reason: ALTCHOICE

## 2023-08-17 RX ORDER — SODIUM CHLORIDE 0.9 % (FLUSH) 0.9 %
5-40 SYRINGE (ML) INJECTION EVERY 12 HOURS SCHEDULED
Status: DISCONTINUED | OUTPATIENT
Start: 2023-08-17 | End: 2023-08-17

## 2023-08-17 RX ORDER — ACETAMINOPHEN 500 MG
1000 TABLET ORAL EVERY 8 HOURS
Status: ACTIVE | OUTPATIENT
Start: 2023-08-17 | End: 2023-08-17

## 2023-08-17 RX ORDER — SODIUM CHLORIDE, SODIUM LACTATE, POTASSIUM CHLORIDE, CALCIUM CHLORIDE 600; 310; 30; 20 MG/100ML; MG/100ML; MG/100ML; MG/100ML
INJECTION, SOLUTION INTRAVENOUS CONTINUOUS PRN
Status: SHIPPED | OUTPATIENT
Start: 2023-08-17

## 2023-08-17 RX ORDER — SODIUM CHLORIDE, SODIUM LACTATE, POTASSIUM CHLORIDE, CALCIUM CHLORIDE 600; 310; 30; 20 MG/100ML; MG/100ML; MG/100ML; MG/100ML
INJECTION, SOLUTION INTRAVENOUS CONTINUOUS PRN
Status: DISCONTINUED | OUTPATIENT
Start: 2023-08-17 | End: 2023-08-17 | Stop reason: SDUPTHER

## 2023-08-17 RX ORDER — SODIUM CHLORIDE, SODIUM LACTATE, POTASSIUM CHLORIDE, CALCIUM CHLORIDE 600; 310; 30; 20 MG/100ML; MG/100ML; MG/100ML; MG/100ML
INJECTION, SOLUTION INTRAVENOUS ONCE
Status: DISCONTINUED | OUTPATIENT
Start: 2023-08-17 | End: 2023-08-17

## 2023-08-17 RX ORDER — HYDRALAZINE HYDROCHLORIDE 20 MG/ML
10 INJECTION INTRAMUSCULAR; INTRAVENOUS
Status: DISCONTINUED | OUTPATIENT
Start: 2023-08-17 | End: 2023-08-17

## 2023-08-17 RX ORDER — IPRATROPIUM BROMIDE AND ALBUTEROL SULFATE 2.5; .5 MG/3ML; MG/3ML
1 SOLUTION RESPIRATORY (INHALATION)
Status: DISCONTINUED | OUTPATIENT
Start: 2023-08-17 | End: 2023-08-17

## 2023-08-17 RX ORDER — LORAZEPAM 2 MG/ML
0.5 INJECTION INTRAMUSCULAR
Status: DISCONTINUED | OUTPATIENT
Start: 2023-08-17 | End: 2023-08-17

## 2023-08-17 RX ORDER — FENTANYL CITRATE 50 UG/ML
50 INJECTION, SOLUTION INTRAMUSCULAR; INTRAVENOUS EVERY 5 MIN PRN
Status: DISCONTINUED | OUTPATIENT
Start: 2023-08-17 | End: 2023-08-17

## 2023-08-17 RX ORDER — EPINEPHRINE IN SOD CHLOR,ISO 1 MG/10 ML
SYRINGE (ML) INTRAVENOUS PRN
Status: DISCONTINUED | OUTPATIENT
Start: 2023-08-17 | End: 2023-08-17 | Stop reason: ALTCHOICE

## 2023-08-17 RX ORDER — HYDROMORPHONE HYDROCHLORIDE 1 MG/ML
0.5 INJECTION, SOLUTION INTRAMUSCULAR; INTRAVENOUS; SUBCUTANEOUS EVERY 5 MIN PRN
Status: DISCONTINUED | OUTPATIENT
Start: 2023-08-17 | End: 2023-08-17

## 2023-08-17 RX ORDER — ROPIVACAINE HYDROCHLORIDE 7.5 MG/ML
INJECTION, SOLUTION EPIDURAL; PERINEURAL PRN
Status: DISCONTINUED | OUTPATIENT
Start: 2023-08-17 | End: 2023-08-17 | Stop reason: ALTCHOICE

## 2023-08-17 RX ORDER — METOCLOPRAMIDE HYDROCHLORIDE 5 MG/ML
10 INJECTION INTRAMUSCULAR; INTRAVENOUS
Status: DISCONTINUED | OUTPATIENT
Start: 2023-08-17 | End: 2023-08-17

## 2023-08-17 RX ORDER — TRANEXAMIC ACID 100 MG/ML
INJECTION, SOLUTION INTRAVENOUS PRN
Status: DISCONTINUED | OUTPATIENT
Start: 2023-08-17 | End: 2023-08-17 | Stop reason: SDUPTHER

## 2023-08-17 RX ORDER — PROPOFOL 10 MG/ML
INJECTION, EMULSION INTRAVENOUS PRN
Status: DISCONTINUED | OUTPATIENT
Start: 2023-08-17 | End: 2023-08-17 | Stop reason: SDUPTHER

## 2023-08-17 RX ORDER — LABETALOL HYDROCHLORIDE 5 MG/ML
10 INJECTION, SOLUTION INTRAVENOUS
Status: DISCONTINUED | OUTPATIENT
Start: 2023-08-17 | End: 2023-08-17

## 2023-08-17 RX ORDER — SODIUM CHLORIDE 0.9 % (FLUSH) 0.9 %
5-40 SYRINGE (ML) INJECTION PRN
Status: DISCONTINUED | OUTPATIENT
Start: 2023-08-17 | End: 2023-08-17

## 2023-08-17 RX ORDER — OXYCODONE HYDROCHLORIDE 5 MG/1
10 TABLET ORAL PRN
Status: DISCONTINUED | OUTPATIENT
Start: 2023-08-17 | End: 2023-08-17

## 2023-08-17 RX ORDER — BUPIVACAINE HYDROCHLORIDE 7.5 MG/ML
INJECTION, SOLUTION INTRASPINAL
Status: COMPLETED | OUTPATIENT
Start: 2023-08-17 | End: 2023-08-17

## 2023-08-17 RX ORDER — PROPOFOL 10 MG/ML
INJECTION, EMULSION INTRAVENOUS PRN
Status: SHIPPED | OUTPATIENT
Start: 2023-08-17

## 2023-08-17 RX ORDER — OXYCODONE HYDROCHLORIDE 5 MG/1
5 TABLET ORAL PRN
Status: DISCONTINUED | OUTPATIENT
Start: 2023-08-17 | End: 2023-08-17

## 2023-08-17 RX ORDER — DIPHENHYDRAMINE HYDROCHLORIDE 50 MG/ML
12.5 INJECTION INTRAMUSCULAR; INTRAVENOUS
Status: DISCONTINUED | OUTPATIENT
Start: 2023-08-17 | End: 2023-08-17

## 2023-08-17 RX ORDER — FENTANYL CITRATE 50 UG/ML
INJECTION, SOLUTION INTRAMUSCULAR; INTRAVENOUS PRN
Status: DISCONTINUED | OUTPATIENT
Start: 2023-08-17 | End: 2023-08-17 | Stop reason: SDUPTHER

## 2023-08-17 RX ORDER — SODIUM CHLORIDE 9 MG/ML
INJECTION, SOLUTION INTRAVENOUS PRN
Status: DISCONTINUED | OUTPATIENT
Start: 2023-08-17 | End: 2023-08-17

## 2023-08-17 RX ADMIN — FENTANYL CITRATE 50 MCG: 50 INJECTION, SOLUTION INTRAMUSCULAR; INTRAVENOUS at 10:38

## 2023-08-17 RX ADMIN — PROPOFOL 30 MCG/KG/MIN: 10 INJECTION, EMULSION INTRAVENOUS at 13:23

## 2023-08-17 RX ADMIN — SODIUM CHLORIDE, PRESERVATIVE FREE 10 ML: 5 INJECTION INTRAVENOUS at 17:51

## 2023-08-17 RX ADMIN — TRANEXAMIC ACID 1000 MG: 1 INJECTION, SOLUTION INTRAVENOUS at 14:50

## 2023-08-17 RX ADMIN — PROPOFOL 20 MG: 10 INJECTION, EMULSION INTRAVENOUS at 12:51

## 2023-08-17 RX ADMIN — MEROPENEM 1000 MG: 1 INJECTION, POWDER, FOR SOLUTION INTRAVENOUS at 17:58

## 2023-08-17 RX ADMIN — SODIUM CHLORIDE, POTASSIUM CHLORIDE, SODIUM LACTATE AND CALCIUM CHLORIDE: 600; 310; 30; 20 INJECTION, SOLUTION INTRAVENOUS at 10:25

## 2023-08-17 RX ADMIN — MEROPENEM 1000 MG: 1 INJECTION, POWDER, FOR SOLUTION INTRAVENOUS at 10:48

## 2023-08-17 RX ADMIN — PROPOFOL 75 MCG/KG/MIN: 10 INJECTION, EMULSION INTRAVENOUS at 14:01

## 2023-08-17 RX ADMIN — CEFAZOLIN SODIUM 2000 MG: 1 INJECTION, POWDER, FOR SOLUTION INTRAMUSCULAR; INTRAVENOUS at 12:39

## 2023-08-17 RX ADMIN — PROPOFOL 20 MG: 10 INJECTION, EMULSION INTRAVENOUS at 10:44

## 2023-08-17 RX ADMIN — SODIUM CHLORIDE, POTASSIUM CHLORIDE, SODIUM LACTATE AND CALCIUM CHLORIDE: 600; 310; 30; 20 INJECTION, SOLUTION INTRAVENOUS at 15:11

## 2023-08-17 RX ADMIN — BUDESONIDE AND FORMOTEROL FUMARATE DIHYDRATE 2 PUFF: 160; 4.5 AEROSOL RESPIRATORY (INHALATION) at 20:45

## 2023-08-17 RX ADMIN — PROPOFOL 50 MG: 10 INJECTION, EMULSION INTRAVENOUS at 10:41

## 2023-08-17 RX ADMIN — MEROPENEM 1000 MG: 1 INJECTION, POWDER, FOR SOLUTION INTRAVENOUS at 00:30

## 2023-08-17 RX ADMIN — MEMANTINE 5 MG: 10 TABLET ORAL at 21:38

## 2023-08-17 RX ADMIN — PROPOFOL 40 MG: 10 INJECTION, EMULSION INTRAVENOUS at 12:59

## 2023-08-17 RX ADMIN — MEROPENEM 1000 MG: 1 INJECTION, POWDER, FOR SOLUTION INTRAVENOUS at 09:45

## 2023-08-17 RX ADMIN — SODIUM CHLORIDE, SODIUM LACTATE, POTASSIUM CHLORIDE, CALCIUM CHLORIDE: 600; 310; 30; 20 INJECTION, SOLUTION INTRAVENOUS at 12:33

## 2023-08-17 RX ADMIN — TRANEXAMIC ACID 1000 MG: 1 INJECTION, SOLUTION INTRAVENOUS at 13:47

## 2023-08-17 RX ADMIN — PROPOFOL 30 MG: 10 INJECTION, EMULSION INTRAVENOUS at 12:54

## 2023-08-17 RX ADMIN — SODIUM CHLORIDE, PRESERVATIVE FREE 10 ML: 5 INJECTION INTRAVENOUS at 21:39

## 2023-08-17 RX ADMIN — FENTANYL CITRATE 50 MCG: 50 INJECTION, SOLUTION INTRAMUSCULAR; INTRAVENOUS at 12:47

## 2023-08-17 RX ADMIN — PHENYLEPHRINE HYDROCHLORIDE 30 MCG/MIN: 10 INJECTION INTRAVENOUS at 13:04

## 2023-08-17 RX ADMIN — CEFAZOLIN 2000 MG: 1 INJECTION, POWDER, FOR SOLUTION INTRAMUSCULAR; INTRAVENOUS at 21:37

## 2023-08-17 RX ADMIN — BUPIVACAINE HYDROCHLORIDE 12.75 MG: 7.5 INJECTION, SOLUTION INTRASPINAL at 12:55

## 2023-08-17 RX ADMIN — BUDESONIDE AND FORMOTEROL FUMARATE DIHYDRATE 2 PUFF: 160; 4.5 AEROSOL RESPIRATORY (INHALATION) at 08:02

## 2023-08-17 RX ADMIN — LEVOTHYROXINE, LIOTHYRONINE 60 MG: 9.5; 2.25 TABLET ORAL at 09:25

## 2023-08-17 ASSESSMENT — PAIN SCALES - GENERAL
PAINLEVEL_OUTOF10: 0
PAINLEVEL_OUTOF10: 1
PAINLEVEL_OUTOF10: 0

## 2023-08-17 NOTE — PROGRESS NOTES
Received patient from the OR to wait for room to be turned over. Per report patient received medication to do spinal in the OR. Pt arrives to holding area, awake and alert and back to baseline per pre-op nurse. Pt denies any discomfort or pain. Will continue to monitor patient.

## 2023-08-17 NOTE — PROGRESS NOTES
P/t was brought into room at 1030, ANES T/O completed, began preparing p/t surgery. At approximately 1050 the patient was induced for the spinal, it was shortly identified that there was a insect flying in the room, the insect was killed, when charge nurse and MD were notified, the team was informed to breakdown the room, terminal clean it and take the patient back to holding, then set up for the case again. P/t left room at 1054 and was in PACU at 1056.

## 2023-08-17 NOTE — ANESTHESIA PROCEDURE NOTES
Spinal Block    Patient location during procedure: OR  End time: 8/17/2023 12:55 PM  Reason for block: primary anesthetic  Staffing  Performed: anesthesiologist   Anesthesiologist: Hina Xiao MD  Spinal Block  Patient position: left lateral decubitus  Prep: ChloraPrep  Patient monitoring: cardiac monitor, continuous pulse ox, continuous capnometry, frequent blood pressure checks and oxygen  Approach: midline  Location: L3/L4  Provider prep: mask and sterile gloves  Local infiltration: lidocaine  Needle  Needle type: Quincke   Needle gauge: 22 G  Needle length: 3.5 in  Assessment  Swirl obtained: Yes  CSF: clear  Attempts: 1  Hemodynamics: stable  Additional Notes  Patient tolerated the procedure well under sedation  Preanesthetic Checklist  Completed: patient identified, IV checked, site marked, risks and benefits discussed, surgical/procedural consents, equipment checked, pre-op evaluation, timeout performed, anesthesia consent given, oxygen available, monitors applied/VS acknowledged, fire risk safety assessment completed and verbalized and blood product R/B/A discussed and consented

## 2023-08-17 NOTE — PROGRESS NOTES
1815: Upper and lower quadrants seems to be more distended. Hyperactive bowel sounds in upper quadrants. Active bowel sounds in lower quadrants. Guarding noted. Patient states no pain at the moment. Notified Dr. Alice Cisse. No new orders at this time. Continue plan of care.

## 2023-08-17 NOTE — OP NOTE
Operative Note      Patient: Penny Calix  YOB: 1934  MRN: [id number]    Date of Procedure: 8/17/2023    Pre-Op Diagnosis Codes:     * Closed fracture of left hip, initial encounter ([address] ) Neymar Vázquez    Post-Op Diagnosis: Same as above       Procedure(s):  HIP HEMIARTHROPLASTY LEFT    Surgeon(s):  Brianna Ndiaye MD    Assistant:   Surgical Assistant: Nitesh Mckeon    Anesthesia: General    Estimated Blood Loss (mL): 437    Complications: None    Specimens:   * No specimens in log *    Implants:  Implant Name Type Inv.  Item Serial No.  Lot No. LRB No. Used Action   CEMENT BNE 40 GM RADIOPAQUE BA SIMPLEX P - SN/A  CEMENT BNE 40 GM RADIOPAQUE BA SIMPLEX P N/A [employer] [facility] [id number] Left 1 Implanted   CENTRALIZER STEM SZ 5 L120MM [id number] DST FEM HIP MAHESH MOLD - SN/A  CENTRALIZER STEM SZ 5 L120MM [id number] DST FEM HIP MAHESH MOLD N/A [facility] [id number] Left 1 Implanted   STEM FEM SZ 5 L120MM NK L34MM 40MM OFFSET 130DEG 12/14 TAPR - SN/A  STEM FEM SZ 5 L120MM NK L34MM 40MM OFFSET 130DEG 12/14 TAPR N/A [employer] [facility] [id number] Left 1 Implanted   HEAD BPLR OD47MM ID28MM FEM HIP BRN POS ECC SELF CNTR - SN/A  HEAD BPLR OD47MM ID28MM FEM HIP BRN POS ECC SELF CNTR N/A [employer] [facility] [facility] [id number] Left 1 Implanted   HEAD FEM [id number] NK L+1.5MM HIP MTL ON MTL 12/14 TAPR - SN/A  HEAD FEM [id number] NK L+1.5MM HIP MTL ON MTL 12/14 TAPR N/A [employer]board [facility] [id number] Left 1 Implanted         Drains:   External Urinary Catheter (Active)   Site Assessment Clean,dry & intact 08/17/23 0754   Placement Repositioned 08/17/23 0754   Catheter Care Catheter/Wick replaced 08/17/23 0754   Perineal Care Yes 08/16/23 2030   Suction 40 mmgHg continuous 08/17/23 0754   Urine Color Yellow 08/17/23 0754   Urine Appearance Clear 08/17/23 0754   Output (mL) 300 mL 08/17/23 0551       Findings: As expected        Detailed Description of Procedure: Gooding cemented stem size 5  Outer head ball 47 mm  Inner head ball 28 mm    Indications: The patient has sustained a left femoral neck fracture. The patient and her power of  which is her daughter were educated on the risk and benefits of nonoperative management versus surgical fixation versus hemiarthroplasty versus total hip arthroplasty. The patient and daughter desires hemiarthroplasty. Risks of surgery include pain, scar, infection, dislocation, instability, leg length differences, heterotopic ossification, rotation changes, stiffness, implant loosening, fracture, revision surgery, blood clots, pulmonary embolism, heart attack, stroke and death. Surgery: The patient was placed on the operating table with all bony promises well-padded. The patient then underwent anesthesia without complications. The patient was then positioned in the lateral decubitus. The operative hip and extremity were then prepped and draped in standard fashion. A surgical timeout was performed indicating that this was the correct patient, procedure, and extremity. All necessary equipment was available and sterile. Everyone in the room was in agreement. The patient received appropriate antibiotics, tranexamic acid, and oral pain control medications as indicated. A posterior approach was then made in standard fashion. Electrocautery was used down to the IT band fascia. The fascia was then incised. The retractors were placed and the piriformis, short external rotators and capsule were then incised as a single sleeve and tagged for later repair. The femoral neck cut was then made. The fractured femoral head and neck were then removed. The acetabulum was inspected and deemed to have adequate cartilage with no significant arthrosis. The head trials were then sized. The femur was then sequentially broached ensuring adequate lateralization.   Once the appropriate broach size was achieved the trial head and neck were

## 2023-08-17 NOTE — PLAN OF CARE
Problem: Discharge Planning  Goal: Discharge to home or other facility with appropriate resources  8/16/2023 1913 by Elvi Casanova RN  Outcome: Progressing  Flowsheets (Taken 8/16/2023 9405)  Discharge to home or other facility with appropriate resources: Identify barriers to discharge with patient and caregiver     Problem: Safety - Adult  Goal: Free from fall injury  8/16/2023 2355 by Anay Dejesus RN  Outcome: Progressing  8/16/2023 1913 by Elvi Casanova RN  Outcome: Progressing     Problem: Pain  Goal: Verbalizes/displays adequate comfort level or baseline comfort level  8/16/2023 2355 by Anay Dejesus RN  Outcome: Progressing  8/16/2023 1913 by Elvi Casanova RN  Outcome: Progressing     Problem: Skin/Tissue Integrity  Goal: Absence of new skin breakdown  Description: 1. Monitor for areas of redness and/or skin breakdown  2. Assess vascular access sites hourly  3. Every 4-6 hours minimum:  Change oxygen saturation probe site  4. Every 4-6 hours:  If on nasal continuous positive airway pressure, respiratory therapy assess nares and determine need for appliance change or resting period.   Outcome: Progressing     Problem: ABCDS Injury Assessment  Goal: Absence of physical injury  Outcome: Progressing

## 2023-08-17 NOTE — PROGRESS NOTES
Hospitalist Progress Note               Daily Progress Note: 8/17/2023      Chief complaint:   Chief Complaint   Patient presents with    Fall        Subjective:   Hospital course to date:    Li Hoover is a 80 y.o. female with history of atrial fibrillation on xarelto, HFrEF, hypertension, asthma. 8/15/23 presents to hospital after a fall the night before resulting in a left femur fracture for which operative management is anticipated. I have been asked to admit to hospital for further stabilization and management of her condition. 8/15/23 orthopedics  Plan will be for surgery tomorrow morning at 0900. Cardiology has already been consulted. Urinalysis with large leukocyte Esterase. Will need to treat this today but plan to proceed with surgery as soon as possible. 8/15/23 XR hip IMPRESSION:  Left femur fracture. ORIF changes of the right femur. 8/15/23 echocardiogram    Left Ventricle: Severely reduced left ventricular systolic function with a visually estimated EF of 25 - 30%. EF by 2D Simpsons Biplane is 40%. Left ventricle size is normal. LVIDd index is 2.62 cm/m2. Mild septal thickening. Mild posterior thickening. Mass index 2D is 103.0 g/m2. Severe global hypokinesis present. Abnormal diastolic function. Right Ventricle: Right ventricle is mildly dilated. Mildly reduced systolic function. Aortic Valve: Valve structure is normal. Mild sclerosis of the aortic valve cusp. Mild to moderate regurgitation with a centrally directed jet. AV PHT is 696.0 ms. Mitral Valve: Mildly thickened leaflet. Moderate regurgitation with a centrally directed jet. Tricuspid Valve: Mild to moderate regurgitation with jet direction toward the septum. The estimated RVSP is 73 mmHg. Left Atrium: Left atrium is severely dilated. Left atrial volume index is severely increased (>48 mL/m2). LA Vol Index is  67 ml/m2. Contrast used: Definity.     8/17/23 no new complaints, tolerating medications

## 2023-08-17 NOTE — OR NURSING
Fluctuations in room temperature throughout the case created concerns by the OR team. OR leadership and , and environmental services notified multiple times to lower temperature. Temperatures still fluctuated team continued to notify environmental services.

## 2023-08-17 NOTE — PLAN OF CARE
Problem: Discharge Planning  Goal: Discharge to home or other facility with appropriate resources  Outcome: Progressing     Problem: Safety - Adult  Goal: Free from fall injury  8/17/2023 1330 by Garalnd Pimentel RN  Outcome: Progressing  8/16/2023 2355 by Marcio Neal RN  Outcome: Progressing     Problem: Pain  Goal: Verbalizes/displays adequate comfort level or baseline comfort level  8/17/2023 1330 by Garland Pimentel RN  Outcome: Progressing  8/16/2023 2355 by Marcio Neal RN  Outcome: Progressing     Problem: Skin/Tissue Integrity  Goal: Absence of new skin breakdown  Description: 1. Monitor for areas of redness and/or skin breakdown  2. Assess vascular access sites hourly  3. Every 4-6 hours minimum:  Change oxygen saturation probe site  4. Every 4-6 hours:  If on nasal continuous positive airway pressure, respiratory therapy assess nares and determine need for appliance change or resting period.   8/17/2023 1330 by Garland Pimentel RN  Outcome: Progressing  8/16/2023 2355 by Marcio Neal RN  Outcome: Progressing     Problem: ABCDS Injury Assessment  Goal: Absence of physical injury  8/17/2023 1330 by Garland Pimentel RN  Outcome: Progressing  8/16/2023 2355 by Marcio Neal RN  Outcome: Progressing

## 2023-08-18 PROBLEM — N39.0 UTI (URINARY TRACT INFECTION): Status: ACTIVE | Noted: 2022-12-29

## 2023-08-18 PROBLEM — F03.90 DEMENTIA (HCC): Status: ACTIVE | Noted: 2021-10-03

## 2023-08-18 PROBLEM — S72.8X2A OTHER FRACTURE OF LEFT FEMUR, INITIAL ENCOUNTER FOR CLOSED FRACTURE (HCC): Status: RESOLVED | Noted: 2023-08-15 | Resolved: 2023-08-18

## 2023-08-18 PROBLEM — I10 HYPERTENSION: Status: ACTIVE | Noted: 2021-10-03

## 2023-08-18 LAB
ALBUMIN SERPL-MCNC: 2.6 G/DL (ref 3.5–5)
ALBUMIN/GLOB SERPL: 0.8 (ref 1.1–2.2)
ALP SERPL-CCNC: 62 U/L (ref 45–117)
ALT SERPL-CCNC: 16 U/L (ref 12–78)
ANION GAP SERPL CALC-SCNC: 6 MMOL/L (ref 5–15)
AST SERPL W P-5'-P-CCNC: 23 U/L (ref 15–37)
BACTERIA SPEC CULT: ABNORMAL
BASOPHILS # BLD: 0 K/UL (ref 0–0.1)
BASOPHILS NFR BLD: 0 % (ref 0–1)
BILIRUB SERPL-MCNC: 0.8 MG/DL (ref 0.2–1)
BUN SERPL-MCNC: 25 MG/DL (ref 6–20)
BUN/CREAT SERPL: 30 (ref 12–20)
CA-I BLD-MCNC: 8.3 MG/DL (ref 8.5–10.1)
CHLORIDE SERPL-SCNC: 100 MMOL/L (ref 97–108)
CO2 SERPL-SCNC: 26 MMOL/L (ref 21–32)
COLONY COUNT, CNT: ABNORMAL
CREAT SERPL-MCNC: 0.83 MG/DL (ref 0.55–1.02)
DIFFERENTIAL METHOD BLD: ABNORMAL
EOSINOPHIL # BLD: 0 K/UL (ref 0–0.4)
EOSINOPHIL NFR BLD: 0 % (ref 0–7)
ERYTHROCYTE [DISTWIDTH] IN BLOOD BY AUTOMATED COUNT: 15.5 % (ref 11.5–14.5)
GLOBULIN SER CALC-MCNC: 3.3 G/DL (ref 2–4)
GLUCOSE SERPL-MCNC: 118 MG/DL (ref 65–100)
HCT VFR BLD AUTO: 31.7 % (ref 35–47)
HGB BLD-MCNC: 10.1 G/DL (ref 11.5–16)
IMM GRANULOCYTES # BLD AUTO: 0.1 K/UL (ref 0–0.04)
IMM GRANULOCYTES NFR BLD AUTO: 1 % (ref 0–0.5)
LYMPHOCYTES # BLD: 0.9 K/UL (ref 0.8–3.5)
LYMPHOCYTES NFR BLD: 9 % (ref 12–49)
Lab: ABNORMAL
MCH RBC QN AUTO: 27.4 PG (ref 26–34)
MCHC RBC AUTO-ENTMCNC: 31.9 G/DL (ref 30–36.5)
MCV RBC AUTO: 85.9 FL (ref 80–99)
MONOCYTES # BLD: 0.7 K/UL (ref 0–1)
MONOCYTES NFR BLD: 7 % (ref 5–13)
NEUTS SEG # BLD: 8.9 K/UL (ref 1.8–8)
NEUTS SEG NFR BLD: 83 % (ref 32–75)
NRBC # BLD: 0 K/UL (ref 0–0.01)
NRBC BLD-RTO: 0 PER 100 WBC
PLATELET # BLD AUTO: 157 K/UL (ref 150–400)
PMV BLD AUTO: 11 FL (ref 8.9–12.9)
POTASSIUM SERPL-SCNC: 4.2 MMOL/L (ref 3.5–5.1)
PROT SERPL-MCNC: 5.9 G/DL (ref 6.4–8.2)
RBC # BLD AUTO: 3.69 M/UL (ref 3.8–5.2)
SARS-COV-2 RDRP RESP QL NAA+PROBE: NOT DETECTED
SODIUM SERPL-SCNC: 132 MMOL/L (ref 136–145)
WBC # BLD AUTO: 10.6 K/UL (ref 3.6–11)

## 2023-08-18 PROCEDURE — 6360000002 HC RX W HCPCS: Performed by: ORTHOPAEDIC SURGERY

## 2023-08-18 PROCEDURE — 6370000000 HC RX 637 (ALT 250 FOR IP): Performed by: INTERNAL MEDICINE

## 2023-08-18 PROCEDURE — 97530 THERAPEUTIC ACTIVITIES: CPT

## 2023-08-18 PROCEDURE — 87635 SARS-COV-2 COVID-19 AMP PRB: CPT

## 2023-08-18 PROCEDURE — 2580000003 HC RX 258: Performed by: INTERNAL MEDICINE

## 2023-08-18 PROCEDURE — 80053 COMPREHEN METABOLIC PANEL: CPT

## 2023-08-18 PROCEDURE — 1100000000 HC RM PRIVATE

## 2023-08-18 PROCEDURE — 2700000000 HC OXYGEN THERAPY PER DAY

## 2023-08-18 PROCEDURE — 85025 COMPLETE CBC W/AUTO DIFF WBC: CPT

## 2023-08-18 PROCEDURE — 2580000003 HC RX 258: Performed by: ORTHOPAEDIC SURGERY

## 2023-08-18 PROCEDURE — 6360000002 HC RX W HCPCS: Performed by: INTERNAL MEDICINE

## 2023-08-18 PROCEDURE — 94640 AIRWAY INHALATION TREATMENT: CPT

## 2023-08-18 PROCEDURE — 97165 OT EVAL LOW COMPLEX 30 MIN: CPT

## 2023-08-18 PROCEDURE — 36415 COLL VENOUS BLD VENIPUNCTURE: CPT

## 2023-08-18 PROCEDURE — 6370000000 HC RX 637 (ALT 250 FOR IP): Performed by: NURSE PRACTITIONER

## 2023-08-18 PROCEDURE — 97162 PT EVAL MOD COMPLEX 30 MIN: CPT

## 2023-08-18 PROCEDURE — 6370000000 HC RX 637 (ALT 250 FOR IP): Performed by: HOSPITALIST

## 2023-08-18 PROCEDURE — 94761 N-INVAS EAR/PLS OXIMETRY MLT: CPT

## 2023-08-18 RX ORDER — NITROFURANTOIN 25; 75 MG/1; MG/1
100 CAPSULE ORAL EVERY 12 HOURS SCHEDULED
Qty: 10 CAPSULE | Refills: 0 | Status: SHIPPED | OUTPATIENT
Start: 2023-08-18 | End: 2023-08-23

## 2023-08-18 RX ORDER — ACETAMINOPHEN 325 MG/1
650 TABLET ORAL EVERY 6 HOURS PRN
Status: DISCONTINUED | OUTPATIENT
Start: 2023-08-18 | End: 2023-08-19 | Stop reason: HOSPADM

## 2023-08-18 RX ORDER — TRAMADOL HYDROCHLORIDE 50 MG/1
50 TABLET ORAL EVERY 6 HOURS PRN
Qty: 12 TABLET | Refills: 0 | Status: SHIPPED | OUTPATIENT
Start: 2023-08-18 | End: 2023-08-21

## 2023-08-18 RX ORDER — NITROFURANTOIN 25; 75 MG/1; MG/1
100 CAPSULE ORAL EVERY 12 HOURS SCHEDULED
Status: DISCONTINUED | OUTPATIENT
Start: 2023-08-18 | End: 2023-08-19 | Stop reason: HOSPADM

## 2023-08-18 RX ADMIN — ACETAMINOPHEN 650 MG: 325 TABLET ORAL at 20:42

## 2023-08-18 RX ADMIN — BUDESONIDE AND FORMOTEROL FUMARATE DIHYDRATE 2 PUFF: 160; 4.5 AEROSOL RESPIRATORY (INHALATION) at 08:03

## 2023-08-18 RX ADMIN — CEFAZOLIN 2000 MG: 1 INJECTION, POWDER, FOR SOLUTION INTRAMUSCULAR; INTRAVENOUS at 04:01

## 2023-08-18 RX ADMIN — LEVOTHYROXINE, LIOTHYRONINE 60 MG: 9.5; 2.25 TABLET ORAL at 11:12

## 2023-08-18 RX ADMIN — NITROFURANTOIN MONOHYDRATE/MACROCRYSTALLINE 100 MG: 25; 75 CAPSULE ORAL at 21:59

## 2023-08-18 RX ADMIN — MEMANTINE 5 MG: 10 TABLET ORAL at 20:42

## 2023-08-18 RX ADMIN — SODIUM CHLORIDE, PRESERVATIVE FREE 10 ML: 5 INJECTION INTRAVENOUS at 22:01

## 2023-08-18 RX ADMIN — MEMANTINE 5 MG: 10 TABLET ORAL at 09:21

## 2023-08-18 RX ADMIN — CEFAZOLIN 2000 MG: 1 INJECTION, POWDER, FOR SOLUTION INTRAMUSCULAR; INTRAVENOUS at 11:12

## 2023-08-18 RX ADMIN — CETIRIZINE HYDROCHLORIDE 10 MG: 10 TABLET, FILM COATED ORAL at 09:21

## 2023-08-18 RX ADMIN — SODIUM CHLORIDE, PRESERVATIVE FREE 10 ML: 5 INJECTION INTRAVENOUS at 11:12

## 2023-08-18 RX ADMIN — MONTELUKAST 10 MG: 10 TABLET, FILM COATED ORAL at 09:20

## 2023-08-18 RX ADMIN — METOPROLOL SUCCINATE 50 MG: 50 TABLET, EXTENDED RELEASE ORAL at 09:21

## 2023-08-18 RX ADMIN — CYANOCOBALAMIN TAB 500 MCG 500 MCG: 500 TAB at 09:21

## 2023-08-18 RX ADMIN — SERTRALINE HYDROCHLORIDE 50 MG: 50 TABLET ORAL at 09:21

## 2023-08-18 RX ADMIN — MEROPENEM 1000 MG: 1 INJECTION, POWDER, FOR SOLUTION INTRAVENOUS at 02:06

## 2023-08-18 RX ADMIN — BUDESONIDE AND FORMOTEROL FUMARATE DIHYDRATE 2 PUFF: 160; 4.5 AEROSOL RESPIRATORY (INHALATION) at 20:17

## 2023-08-18 ASSESSMENT — PAIN SCALES - GENERAL: PAINLEVEL_OUTOF10: 0

## 2023-08-18 NOTE — CARE COORDINATION
CATRACHO reviewed Pt medicals, will need PT/OT eval.  Pt had surgery yesterday, will D/C to [address] [place] [address] when medically stable. 1:30  Pt is D/C, sent over updated medicals to [facility]. [place] How[place] [facility] [place] can [place]. Left a .     2:00 PM  [facility] returned CM call, faxed over Pt medicals. 3:10  [facility] called and stated that they can accept Pt tomorrow. [facility] asked that Pt get there at 11:00 AM.    Report # [phone]    CATRACHO sent to [facility] Pt MAR and D/C order. [facility] asked for a COVID test, please call the results to the report number in the morning with results. CATRACHO called Pt daughter, Jonathan Cavazos, she is out of town. Jonathan Cavazos stated that her sister will transport Pt to [address] [place] [address] sister  will be here at 9:30 to transport Pt. Transition of Care Plan:    RUR: 16%  Prior Level of Functioning: Dependent  Disposition: [facility]   If SNF or IPR: Date FOC offered: Aug 15  Date [facility] received: Aug 15  Accepting facility: [facility]  Date authorization started with reference number: Aug 16   Transportation at discharge: Family    IM/IMM Medicare/ letter given: Yes     Caregiver Contact: Yes  Discharge Caregiver contacted prior to discharge? Yes  Care Conference needed?  No  Barriers to discharge: None

## 2023-08-18 NOTE — PLAN OF CARE
OCCUPATIONAL THERAPY EVALUATION  Patient: Rica Schwartz (89 y.o. female)  Date: 8/18/2023  Primary Diagnosis: Other fracture of left femur, initial encounter for closed fracture ([address]) [S72.8X2A]  Closed fracture of neck of left femur, initial encounter ([address]) [S72.002A]  Procedure(s) (LRB):  HIP HEMIARTHROPLASTY LEFT (Left) 1 Day Post-Op   Precautions: Ax2, Fall Risk   Left Lower Extremity Weight Bearing: Weight Bearing As Tolerated         Hip Precautions: Posterior hip precautions, No hip flexion > 90 degrees, No hip internal rotation, No ADduction  In place during session:Peripheral IV, External Catheter, and EKG/telemetry     ASSESSMENT  Pt is a 80 y.o. female presenting to [facility] with c/o a fall, admitted 8/15/23 and currently being treated for L femur fracture, COPD without exacerbation, urinary infection, dementia, hypothyroidism, and DVT prophylaxis. Pt received seated EOB with PT upon arrival, AXO x person, and agreeable to OT evaluation. Based on current observations, pt presents with decreased  functional mobility, independence in ADLs, high-level IADLs, ROM, strength, body mechanics, activity tolerance, endurance, safety awareness, cognition, command following, attention/concentration, coordination, balance, posture, increased pain levels (see below for objective details and assist levels). Overall, pt tolerates session fair with c/o 2/10 L hip. Sit<>stand transfers and SPT EOB>chair completed with mod Ax2 and additional time. VC req'd to push from bed during sit>stand with pt demo'ing understanding. VC req'd throughout SPT for initiation and sequencing of steps with RW. VC req'd to reach back for chair during stand>sit with pt demo'ing understanding. Pt then washed face with set up A to hand pt wet wash cloth. Pt educated on posterior hip precautions with pt verbalizing understanding.      Pt will benefit from continued skilled OT services to address current impairments and improve IND and

## 2023-08-18 NOTE — PROGRESS NOTES
CARDIOLOGY PROGRESS NOTE      Patient Name: Reid Calix  Age: 80 y.o. Gender:female  :1934  MRN: [id number]    Patient seen and examined. This is a patient with a history of atrial fibrillation, heart failure with mildly reduced EF, hypertension, asthma who presented with hip fracture now being followed for preoperative risk assessment. S/p surgery . She denies chest pain or shortness of breath. Feeling fine overall. No other complaints reported. Telemetry reviewed, there were no events noted in the past 24 hours. Pertinent review of systems items noted above, all other systems are negative. Current medications reviewed. Physical Examination    Allergies   Allergen Reactions    Ciprofloxacin Hcl Other (See Comments)    Meloxicam Other (See Comments)    Penicillin G Other (See Comments)    Shrimp Extract Allergy Skin Test Other (See Comments)    Telithromycin Other (See Comments)     Vitals:    23 1259   BP: 115/70   Pulse: 79   Resp:    Temp:    SpO2: 96%     Vital signs are stable  No apparent distress. Heart has an irregularly irregular rate and rhythm.  + murmur  Lungs are clear  Abdomen is soft, nontender, normal bowel sounds. Extremities have trace edema  Skin is dry and warm.   Normal affect, +Grindstone    Labs reviewed:  Recent Results (from the past 12 hour(s))   Comprehensive Metabolic Panel w/ Reflex to MG    Collection Time: 23  5:41 AM   Result Value Ref Range    Sodium 132 (L) 136 - 145 mmol/L    Potassium 4.2 3.5 - 5.1 mmol/L    Chloride 100 97 - 108 mmol/L    CO2 26 21 - 32 mmol/L    Anion Gap 6 5 - 15 mmol/L    Glucose 118 (H) 65 - 100 mg/dL    BUN 25 (H) 6 - 20 mg/dL    Creatinine 0.83 0.55 - 1.02 mg/dL    Bun/Cre Ratio 30 (H) 12 - 20      Est, Glom Filt Rate >60 >60 ml/min/1.73m2    Calcium 8.3 (L) 8.5 - 10.1 mg/dL    Total Bilirubin 0.8 0.2 - 1.0 mg/dL    AST 23 15 - 37 U/L    ALT 16 12 - 78 U/L    Alk Phosphatase 62 45 - 117 U/L    Total Protein 5.9 (L) 6.4

## 2023-08-18 NOTE — DISCHARGE SUMMARY
injection  Commonly known as: EPIPEN     ergocalciferol 1.25 MG (68954 UT) capsule  Commonly known as: ERGOCALCIFEROL     furosemide 20 MG tablet  Commonly known as: LASIX     loperamide 2 MG tablet  Commonly known as: IMODIUM A-D     memantine 5 MG tablet  Commonly known as: NAMENDA     metoprolol succinate 50 MG extended release tablet  Commonly known as: TOPROL XL     montelukast 10 MG tablet  Commonly known as: SINGULAIR     sertraline 50 MG tablet  Commonly known as: ZOLOFT     thyroid 60 MG tablet  Commonly known as: ARMOUR     vitamin B-12 500 MCG tablet  Commonly known as: CYANOCOBALAMIN           * This list has 2 medication(s) that are the same as other medications prescribed for you. Read the directions carefully, and ask your doctor or other care provider to review them with you. STOP taking these medications      12 Hour Nasal Decongestant 0.05 % nasal spray  Generic drug: oxymetazoline     Cranberry-Cholecalciferol 4200-500 MG-UNIT Caps     guaiFENesin 600 MG extended release tablet  Commonly known as: MUCINEX     LACTOBACILLUS PROBIOTIC PO     melatonin 3 MG Tabs tablet     saccharomyces boulardii 250 MG capsule  Commonly known as: FLORASTOR     sodium chloride 0.65 % nasal spray  Commonly known as: OCEAN, BABY AYR     TUSSIN DM PO     zolpidem 5 MG tablet  Commonly known as: AMBIEN               Where to Get Your Medications        Information about where to get these medications is not yet available    Ask your nurse or doctor about these medications  nitrofurantoin (macrocrystal-monohydrate) 100 MG capsule  rivaroxaban 10 MG Tabs tablet  rivaroxaban 15 MG Tabs tablet  traMADol 50 MG tablet             DISPOSITION:    Home with Family:       Home with HH/PT/OT/RN:    SNF/LTC: X   ALISON:    OTHER:            Code status: DNR  Recommended diet: regular diet  Recommended activity: activity as tolerated per PT/OT   Wound care: Keep left femur incision site clean dry intact.  Mepilex Ag dressing changes as needed.        Follow up with:   PCP : MD Myranda Barros MD  [address] [place] [address]  [phone]    Follow up in 2 week(s)  Follow-up appointment after L hip hemiarthroplasty 08/17/23    Whit Cruz MD  [address] [facility]  [place] [address]  [phone]    Follow up in 1 month(s)            Total time in minutes spent coordinating this discharge (includes going over instructions, follow-up, prescriptions, and preparing report for sign off to her PCP) :  35 minutes

## 2023-08-19 VITALS
WEIGHT: 182.32 LBS | HEIGHT: 70 IN | OXYGEN SATURATION: 95 % | DIASTOLIC BLOOD PRESSURE: 76 MMHG | SYSTOLIC BLOOD PRESSURE: 130 MMHG | RESPIRATION RATE: 21 BRPM | TEMPERATURE: 97.9 F | BODY MASS INDEX: 26.1 KG/M2 | HEART RATE: 112 BPM

## 2023-08-19 LAB
ALBUMIN SERPL-MCNC: 2.4 G/DL (ref 3.5–5)
ALBUMIN/GLOB SERPL: 0.8 (ref 1.1–2.2)
ALP SERPL-CCNC: 62 U/L (ref 45–117)
ALT SERPL-CCNC: 11 U/L (ref 12–78)
ANION GAP SERPL CALC-SCNC: 6 MMOL/L (ref 5–15)
AST SERPL W P-5'-P-CCNC: 29 U/L (ref 15–37)
BASOPHILS # BLD: 0 K/UL (ref 0–0.1)
BASOPHILS NFR BLD: 0 % (ref 0–1)
BILIRUB SERPL-MCNC: 1 MG/DL (ref 0.2–1)
BUN SERPL-MCNC: 24 MG/DL (ref 6–20)
BUN/CREAT SERPL: 39 (ref 12–20)
CA-I BLD-MCNC: 8.3 MG/DL (ref 8.5–10.1)
CHLORIDE SERPL-SCNC: 101 MMOL/L (ref 97–108)
CO2 SERPL-SCNC: 26 MMOL/L (ref 21–32)
CREAT SERPL-MCNC: 0.61 MG/DL (ref 0.55–1.02)
DIFFERENTIAL METHOD BLD: ABNORMAL
EOSINOPHIL # BLD: 0 K/UL (ref 0–0.4)
EOSINOPHIL NFR BLD: 0 % (ref 0–7)
ERYTHROCYTE [DISTWIDTH] IN BLOOD BY AUTOMATED COUNT: 15 % (ref 11.5–14.5)
GLOBULIN SER CALC-MCNC: 3.1 G/DL (ref 2–4)
GLUCOSE SERPL-MCNC: 116 MG/DL (ref 65–100)
HCT VFR BLD AUTO: 28.4 % (ref 35–47)
HGB BLD-MCNC: 9.3 G/DL (ref 11.5–16)
IMM GRANULOCYTES # BLD AUTO: 0.1 K/UL (ref 0–0.04)
IMM GRANULOCYTES NFR BLD AUTO: 1 % (ref 0–0.5)
LYMPHOCYTES # BLD: 1.1 K/UL (ref 0.8–3.5)
LYMPHOCYTES NFR BLD: 12 % (ref 12–49)
MCH RBC QN AUTO: 27.3 PG (ref 26–34)
MCHC RBC AUTO-ENTMCNC: 32.7 G/DL (ref 30–36.5)
MCV RBC AUTO: 83.3 FL (ref 80–99)
MONOCYTES # BLD: 0.8 K/UL (ref 0–1)
MONOCYTES NFR BLD: 9 % (ref 5–13)
NEUTS SEG # BLD: 6.9 K/UL (ref 1.8–8)
NEUTS SEG NFR BLD: 78 % (ref 32–75)
NRBC # BLD: 0 K/UL (ref 0–0.01)
NRBC BLD-RTO: 0 PER 100 WBC
PLATELET # BLD AUTO: 175 K/UL (ref 150–400)
PMV BLD AUTO: 10.5 FL (ref 8.9–12.9)
POTASSIUM SERPL-SCNC: 4 MMOL/L (ref 3.5–5.1)
PROT SERPL-MCNC: 5.5 G/DL (ref 6.4–8.2)
RBC # BLD AUTO: 3.41 M/UL (ref 3.8–5.2)
SODIUM SERPL-SCNC: 133 MMOL/L (ref 136–145)
WBC # BLD AUTO: 8.9 K/UL (ref 3.6–11)

## 2023-08-19 PROCEDURE — 36415 COLL VENOUS BLD VENIPUNCTURE: CPT

## 2023-08-19 PROCEDURE — 85025 COMPLETE CBC W/AUTO DIFF WBC: CPT

## 2023-08-19 PROCEDURE — 80053 COMPREHEN METABOLIC PANEL: CPT

## 2023-08-19 NOTE — PLAN OF CARE
Problem: Safety - Adult  Goal: Free from fall injury  Outcome: Progressing     Problem: Pain  Goal: Verbalizes/displays adequate comfort level or baseline comfort level  Outcome: Progressing     Problem: Skin/Tissue Integrity  Goal: Absence of new skin breakdown  Description: 1. Monitor for areas of redness and/or skin breakdown  2. Assess vascular access sites hourly  3. Every 4-6 hours minimum:  Change oxygen saturation probe site  4. Every 4-6 hours:  If on nasal continuous positive airway pressure, respiratory therapy assess nares and determine need for appliance change or resting period. Outcome: Progressing     Problem: ABCDS Injury Assessment  Goal: Absence of physical injury  Outcome: Progressing     Problem: Chronic Conditions and Co-morbidities  Goal: Patient's chronic conditions and co-morbidity symptoms are monitored and maintained or improved  Outcome: Progressing     Problem: Physical Therapy - Adult  Goal: By Discharge: Performs mobility at highest level of function for planned discharge setting. See evaluation for individualized goals. Description: FUNCTIONAL STATUS PRIOR TO ADMISSION: Patient was modified independent using a rollator for functional mobility. HOME SUPPORT PRIOR TO ADMISSION: Per pt, the patient lived with spouse and did not require assistance. However, pt with cognition deficits, per chart review and CM note, pt is a resident of [facility] ([facility] at [address] [place] [address]) and required assistance with ADL's. Physical Therapy Goals  Initiated 8/18/2023  Pt stated goal: To go home  Pt will be I with LE HEP in 7 days. Pt will perform bed mobility with Minimal Assist in 7 days. Pt will perform transfers with Minimal Assist in 7 days. Pt will amb -50 feet with LRAD safely with Minimal Assist in 7 days. Pt will verbalize and demonstrate compliance with posterior precautions  in 7 days. Pt will demonstrate improvement in sitting and standing balance to good in 7 days.

## 2023-08-19 NOTE — PROGRESS NOTES
Notified Dr. Clemente Benitez for temp 101.7. Orders received Tylenol 650 Q6 prn for fever and pain. Patient is requesting a fill of her alprazolam. States she is going out of town on Monday to a conference and there will be big crowds, which causes her anxiety.

## 2023-08-24 ENCOUNTER — HOSPITAL ENCOUNTER (OUTPATIENT)
Age: 88
End: 2023-08-24
Payer: MEDICARE

## 2023-08-24 ENCOUNTER — HOSPITAL ENCOUNTER (OUTPATIENT)
Age: 88
Discharge: HOME | End: 2023-08-24
Payer: MEDICARE

## 2023-08-24 DIAGNOSIS — R07.9 CHEST PAIN, UNSPECIFIED TYPE: ICD-10-CM

## 2023-08-24 DIAGNOSIS — R07.89 TIGHTNESS IN CHEST: ICD-10-CM

## 2023-08-24 LAB
ALBUMIN SERPL-MCNC: 2.5 G/DL (ref 3.4–5)
ALBUMIN/GLOB SERPL: 0.7 (ref 0.8–1.7)
ALP SERPL-CCNC: 88 U/L (ref 45–117)
ALT SERPL-CCNC: 20 U/L (ref 13–56)
ANION GAP SERPL CALC-SCNC: 4 MMOL/L (ref 3–18)
AST SERPL W P-5'-P-CCNC: 23 U/L (ref 10–38)
BASOPHILS # BLD: 0.1 K/UL (ref 0–0.1)
BASOPHILS NFR BLD: 1 % (ref 0–2)
BILIRUB SERPL-MCNC: 0.5 MG/DL (ref 0.2–1)
BUN SERPL-MCNC: 17 MG/DL (ref 7–18)
BUN/CREAT SERPL: 24 (ref 12–20)
CA-I BLD-MCNC: 8.4 MG/DL (ref 8.5–10.1)
CHLORIDE SERPL-SCNC: 99 MMOL/L (ref 100–111)
CO2 SERPL-SCNC: 31 MMOL/L (ref 21–32)
CREAT SERPL-MCNC: 0.72 MG/DL (ref 0.6–1.3)
DIFFERENTIAL METHOD BLD: ABNORMAL
EKG DIAGNOSIS: NORMAL
EKG Q-T INTERVAL: 334 MS
EKG QRS DURATION: 94 MS
EKG QTC CALCULATION (BAZETT): 397 MS
EKG R AXIS: 1 DEGREES
EKG T AXIS: 110 DEGREES
EKG VENTRICULAR RATE: 85 BPM
EOSINOPHIL # BLD: 0.1 K/UL (ref 0–0.4)
EOSINOPHIL NFR BLD: 1 % (ref 0–5)
ERYTHROCYTE [DISTWIDTH] IN BLOOD BY AUTOMATED COUNT: 15.2 % (ref 11.6–14.5)
GLOBULIN SER CALC-MCNC: 3.4 G/DL (ref 2–4)
GLUCOSE SERPL-MCNC: 185 MG/DL (ref 74–99)
HCT VFR BLD AUTO: 30.4 % (ref 35–45)
HGB BLD-MCNC: 9.5 G/DL (ref 12–16)
IMM GRANULOCYTES # BLD AUTO: 0.1 K/UL (ref 0–0.04)
IMM GRANULOCYTES NFR BLD AUTO: 1 % (ref 0–0.5)
LYMPHOCYTES # BLD: 1.1 K/UL (ref 0.9–3.6)
LYMPHOCYTES NFR BLD: 16 % (ref 21–52)
MCH RBC QN AUTO: 26.6 PG (ref 24–34)
MCHC RBC AUTO-ENTMCNC: 31.3 G/DL (ref 31–37)
MCV RBC AUTO: 85.2 FL (ref 78–100)
MONOCYTES # BLD: 0.5 K/UL (ref 0.05–1.2)
MONOCYTES NFR BLD: 8 % (ref 3–10)
NEUTS SEG # BLD: 5.3 K/UL (ref 1.8–8)
NEUTS SEG NFR BLD: 73 % (ref 40–73)
NRBC # BLD: 0 K/UL (ref 0–0.01)
NRBC BLD-RTO: 0 PER 100 WBC
PLATELET # BLD AUTO: 368 K/UL (ref 135–420)
PMV BLD AUTO: 9.5 FL (ref 9.2–11.8)
POTASSIUM SERPL-SCNC: 3.9 MMOL/L (ref 3.5–5.5)
PROT SERPL-MCNC: 5.9 G/DL (ref 6.4–8.2)
RBC # BLD AUTO: 3.57 M/UL (ref 4.2–5.3)
SODIUM SERPL-SCNC: 134 MMOL/L (ref 136–145)
TROPONIN I SERPL HS-MCNC: 16 NG/L (ref 0–54)
WBC # BLD AUTO: 7.1 K/UL (ref 4.6–13.2)

## 2023-08-24 PROCEDURE — 36415 COLL VENOUS BLD VENIPUNCTURE: CPT

## 2023-08-24 PROCEDURE — 84484 ASSAY OF TROPONIN QUANT: CPT

## 2023-08-24 PROCEDURE — 80053 COMPREHEN METABOLIC PANEL: CPT

## 2023-08-24 PROCEDURE — 71045 X-RAY EXAM CHEST 1 VIEW: CPT

## 2023-08-24 PROCEDURE — 93005 ELECTROCARDIOGRAM TRACING: CPT

## 2023-08-24 PROCEDURE — 85025 COMPLETE CBC W/AUTO DIFF WBC: CPT

## 2023-09-07 NOTE — ED TRIAGE NOTES
Please call the patient to let them know:    1) The left thyroid nodule came back benign.    2) The right nodule was non-diagnostic again, so we are going to send the extra sample that we saved for molecular testing to rule out thyroid cancer.    Please send the saved vial for molecular markers on the right nodule.   Resident of the [facility] in [place]. Pt had fall around 0300 this am. Dx with hip fx and pneumonia at [address] [place] [address], Transfer to [facility] due to no ortho at [address] [place] [address]. Pt alert and oriented but has hx of dementia. Pt has DNR.

## 2023-09-11 DIAGNOSIS — Z87.81 S/P LEFT HIP FRACTURE: Primary | ICD-10-CM

## 2023-09-15 ENCOUNTER — OFFICE VISIT (OUTPATIENT)
Age: 88
End: 2023-09-15

## 2023-09-15 VITALS
BODY MASS INDEX: 21.14 KG/M2 | HEIGHT: 71 IN | TEMPERATURE: 98.9 F | WEIGHT: 151 LBS | SYSTOLIC BLOOD PRESSURE: 128 MMHG | HEART RATE: 94 BPM | DIASTOLIC BLOOD PRESSURE: 72 MMHG

## 2023-09-15 DIAGNOSIS — Z47.1 AFTERCARE FOLLOWING LEFT HIP JOINT REPLACEMENT SURGERY: Primary | ICD-10-CM

## 2023-09-15 DIAGNOSIS — Z96.642 AFTERCARE FOLLOWING LEFT HIP JOINT REPLACEMENT SURGERY: Primary | ICD-10-CM

## 2023-09-15 DIAGNOSIS — S72.002D CLOSED FRACTURE OF LEFT HIP WITH ROUTINE HEALING, SUBSEQUENT ENCOUNTER: ICD-10-CM

## 2023-09-15 PROCEDURE — 99024 POSTOP FOLLOW-UP VISIT: CPT | Performed by: ORTHOPAEDIC SURGERY

## 2023-09-15 NOTE — PATIENT INSTRUCTIONS
Bear as tolerated with a walker and help. Gentle physical therapy.   Follow-up 1 month with x-ray left hip

## 2023-09-15 NOTE — PROGRESS NOTES
Almost a month from cemented hemiarthroplasty to the left hip  Patient has been doing well from the orthopedic point of view. On examination  No calf tenderness bilaterally. Neurovascular intact both feet. The wound is nicely healed. Unfortunately the patient could not have her x-ray done. Plan  Activity as tolerated weight-bear as tolerated with a walker at all times. Gentle physical therapy.   Follow-up 1 month with x-ray left hip

## 2023-09-17 PROBLEM — N39.0 UTI (URINARY TRACT INFECTION): Status: RESOLVED | Noted: 2022-12-29 | Resolved: 2023-09-17

## 2023-09-29 NOTE — ANESTHESIA POSTPROCEDURE EVALUATION
Department of Anesthesiology  Postprocedure Note    Patient: Markell Iniguez  MRN: [id number]  YOB: 1934    Procedure Summary     Date: 08/17/23 Room / Location: [facility] MAIN OR 05 / [facility] MAIN OR    Anesthesia Start: 4613 Anesthesia Stop: 5196    Procedures:       HIP HEMIARTHROPLASTY LEFT (Left: Hip)      Unlinked Anesthesia Record Diagnosis:       Closed fracture of left hip, initial encounter ([address])      (Closed fracture of left hip, initial encounter ([address]) Diann Whitfield)    Surgeons: Crissy Villagomez MD Responsible Provider: Alvin Amaral MD    Anesthesia Type: MAC, Spinal ASA Status: 4          Anesthesia Type: MAC, Spinal    Bibi Phase I: Bibi Score: 9    Bibi Phase II:        Anesthesia Post Evaluation    Patient location during evaluation: PACU  Patient participation: complete - patient cannot participate  Level of consciousness: awake  Pain score: 0  Airway patency: patent  Nausea & Vomiting: no nausea and no vomiting  Complications: no  Cardiovascular status: hemodynamically stable  Respiratory status: acceptable  Hydration status: stable  Multimodal analgesia pain management approach

## 2023-10-02 ENCOUNTER — TRANSCRIBE ORDERS (OUTPATIENT)
Facility: HOSPITAL | Age: 88
End: 2023-10-02

## 2023-10-02 DIAGNOSIS — S09.90XA TRAUMATIC INJURY OF HEAD, INITIAL ENCOUNTER: Primary | ICD-10-CM

## 2023-10-02 DIAGNOSIS — S09.90XA INJURY OF HEAD, INITIAL ENCOUNTER: Primary | ICD-10-CM

## 2023-10-02 DIAGNOSIS — S09.90XA UNSPECIFIED INJURY OF HEAD, INITIAL ENCOUNTER: ICD-10-CM

## 2023-10-02 DIAGNOSIS — S09.90XA TRAUMATIC INJURY OF HEAD, INITIAL ENCOUNTER: ICD-10-CM

## 2023-10-06 ENCOUNTER — HOSPITAL ENCOUNTER (OUTPATIENT)
Age: 88
End: 2023-10-06
Payer: MEDICARE

## 2023-10-06 DIAGNOSIS — S09.90XA INJURY OF HEAD, INITIAL ENCOUNTER: ICD-10-CM

## 2023-10-06 PROCEDURE — 70450 CT HEAD/BRAIN W/O DYE: CPT

## 2023-10-06 PROCEDURE — 72125 CT NECK SPINE W/O DYE: CPT

## 2023-10-18 ENCOUNTER — HOSPITAL ENCOUNTER (OUTPATIENT)
Age: 88
Discharge: HOME | End: 2023-10-21
Payer: MEDICARE

## 2023-10-18 LAB
APPEARANCE UR: ABNORMAL
BACTERIA URNS QL MICRO: ABNORMAL /HPF
BILIRUB UR QL: NEGATIVE
COLOR UR: YELLOW
EPITH CASTS URNS QL MICRO: ABNORMAL /LPF (ref 0–20)
GLUCOSE UR STRIP.AUTO-MCNC: NEGATIVE MG/DL
HGB UR QL STRIP: ABNORMAL
KETONES UR QL STRIP.AUTO: NEGATIVE MG/DL
LEUKOCYTE ESTERASE UR QL STRIP.AUTO: ABNORMAL
NITRITE UR QL STRIP.AUTO: POSITIVE
PH UR STRIP: 5.5 (ref 5–8)
PROT UR STRIP-MCNC: ABNORMAL MG/DL
RBC #/AREA URNS HPF: ABNORMAL /HPF (ref 0–2)
SP GR UR REFRACTOMETRY: 1.02 (ref 1–1.03)
UROBILINOGEN UR QL STRIP.AUTO: 1 EU/DL (ref 0.2–1)
WBC URNS QL MICRO: >100 /HPF (ref 0–4)

## 2023-10-18 PROCEDURE — 87077 CULTURE AEROBIC IDENTIFY: CPT

## 2023-10-18 PROCEDURE — 81001 URINALYSIS AUTO W/SCOPE: CPT

## 2023-10-18 PROCEDURE — 87086 URINE CULTURE/COLONY COUNT: CPT

## 2023-10-18 PROCEDURE — 87186 SC STD MICRODIL/AGAR DIL: CPT

## 2023-10-21 LAB
BACTERIA SPEC CULT: ABNORMAL
COLONY COUNT, CNT: ABNORMAL
Lab: ABNORMAL

## 2024-01-01 ENCOUNTER — APPOINTMENT (OUTPATIENT)
Age: 89
DRG: 291 | End: 2024-01-01
Payer: MEDICARE

## 2024-01-01 ENCOUNTER — HOSPITAL ENCOUNTER (INPATIENT)
Age: 89
LOS: 1 days | Discharge: HOME | DRG: 291 | End: 2024-01-02
Attending: EMERGENCY MEDICINE | Admitting: INTERNAL MEDICINE
Payer: MEDICARE

## 2024-01-01 DIAGNOSIS — J45.21 MILD INTERMITTENT ASTHMA WITH ACUTE EXACERBATION: ICD-10-CM

## 2024-01-01 DIAGNOSIS — I50.43 ACUTE ON CHRONIC COMBINED SYSTOLIC AND DIASTOLIC CHF (CONGESTIVE HEART FAILURE) (HCC): Primary | ICD-10-CM

## 2024-01-01 LAB
ALBUMIN SERPL-MCNC: 3.4 G/DL (ref 3.4–5)
ALBUMIN/GLOB SERPL: 0.9 (ref 0.8–1.7)
ALP SERPL-CCNC: 98 U/L (ref 45–117)
ALT SERPL-CCNC: 19 U/L (ref 13–56)
ANION GAP SERPL CALC-SCNC: 7 MMOL/L (ref 3–18)
AST SERPL W P-5'-P-CCNC: 17 U/L (ref 10–38)
BASOPHILS # BLD: 0 K/UL (ref 0–0.1)
BASOPHILS NFR BLD: 0 % (ref 0–2)
BILIRUB SERPL-MCNC: 0.4 MG/DL (ref 0.2–1)
BNP SERPL-MCNC: 5229 PG/ML (ref 0–1800)
BUN SERPL-MCNC: 28 MG/DL (ref 7–18)
BUN/CREAT SERPL: 26 (ref 12–20)
CA-I BLD-MCNC: 8.7 MG/DL (ref 8.5–10.1)
CHLORIDE SERPL-SCNC: 104 MMOL/L (ref 100–111)
CO2 SERPL-SCNC: 31 MMOL/L (ref 21–32)
CREAT SERPL-MCNC: 1.07 MG/DL (ref 0.6–1.3)
DIFFERENTIAL METHOD BLD: ABNORMAL
EOSINOPHIL # BLD: 0 K/UL (ref 0–0.4)
EOSINOPHIL NFR BLD: 0 % (ref 0–5)
ERYTHROCYTE [DISTWIDTH] IN BLOOD BY AUTOMATED COUNT: 17.1 % (ref 11.6–14.5)
FLUAV AG NPH QL IA: NEGATIVE
FLUBV AG NOSE QL IA: NEGATIVE
GLOBULIN SER CALC-MCNC: 3.8 G/DL (ref 2–4)
GLUCOSE SERPL-MCNC: 110 MG/DL (ref 74–99)
HCT VFR BLD AUTO: 33.9 % (ref 35–45)
HGB BLD-MCNC: 10.4 G/DL (ref 12–16)
IMM GRANULOCYTES # BLD AUTO: 0 K/UL (ref 0–0.04)
IMM GRANULOCYTES NFR BLD AUTO: 0 % (ref 0–0.5)
LYMPHOCYTES # BLD: 1.1 K/UL (ref 0.9–3.6)
LYMPHOCYTES NFR BLD: 14 % (ref 21–52)
MCH RBC QN AUTO: 24.2 PG (ref 24–34)
MCHC RBC AUTO-ENTMCNC: 30.7 G/DL (ref 31–37)
MCV RBC AUTO: 79 FL (ref 78–100)
MONOCYTES # BLD: 0.6 K/UL (ref 0.05–1.2)
MONOCYTES NFR BLD: 8 % (ref 3–10)
NEUTS SEG # BLD: 6 K/UL (ref 1.8–8)
NEUTS SEG NFR BLD: 78 % (ref 40–73)
NRBC # BLD: 0 K/UL (ref 0–0.01)
NRBC BLD-RTO: 0 PER 100 WBC
PLATELET # BLD AUTO: 195 K/UL (ref 135–420)
PMV BLD AUTO: 9.5 FL (ref 9.2–11.8)
POTASSIUM SERPL-SCNC: 3.8 MMOL/L (ref 3.5–5.5)
PROT SERPL-MCNC: 7.2 G/DL (ref 6.4–8.2)
RBC # BLD AUTO: 4.29 M/UL (ref 4.2–5.3)
SARS-COV-2 RDRP RESP QL NAA+PROBE: NOT DETECTED
SODIUM SERPL-SCNC: 142 MMOL/L (ref 136–145)
TROPONIN I SERPL HS-MCNC: 21 NG/L (ref 0–54)
WBC # BLD AUTO: 7.8 K/UL (ref 4.6–13.2)

## 2024-01-01 PROCEDURE — 2580000003 HC RX 258: Performed by: NURSE PRACTITIONER

## 2024-01-01 PROCEDURE — 94664 DEMO&/EVAL PT USE INHALER: CPT

## 2024-01-01 PROCEDURE — 1100000000 HC RM PRIVATE

## 2024-01-01 PROCEDURE — 84484 ASSAY OF TROPONIN QUANT: CPT

## 2024-01-01 PROCEDURE — 93005 ELECTROCARDIOGRAM TRACING: CPT | Performed by: EMERGENCY MEDICINE

## 2024-01-01 PROCEDURE — 87804 INFLUENZA ASSAY W/OPTIC: CPT

## 2024-01-01 PROCEDURE — 94640 AIRWAY INHALATION TREATMENT: CPT

## 2024-01-01 PROCEDURE — 80053 COMPREHEN METABOLIC PANEL: CPT

## 2024-01-01 PROCEDURE — 6370000000 HC RX 637 (ALT 250 FOR IP): Performed by: NURSE PRACTITIONER

## 2024-01-01 PROCEDURE — 6370000000 HC RX 637 (ALT 250 FOR IP): Performed by: EMERGENCY MEDICINE

## 2024-01-01 PROCEDURE — 85025 COMPLETE CBC W/AUTO DIFF WBC: CPT

## 2024-01-01 PROCEDURE — 99285 EMERGENCY DEPT VISIT HI MDM: CPT

## 2024-01-01 PROCEDURE — 6360000002 HC RX W HCPCS: Performed by: EMERGENCY MEDICINE

## 2024-01-01 PROCEDURE — 87635 SARS-COV-2 COVID-19 AMP PRB: CPT

## 2024-01-01 PROCEDURE — 94760 N-INVAS EAR/PLS OXIMETRY 1: CPT

## 2024-01-01 PROCEDURE — 71046 X-RAY EXAM CHEST 2 VIEWS: CPT

## 2024-01-01 PROCEDURE — 83880 ASSAY OF NATRIURETIC PEPTIDE: CPT

## 2024-01-01 PROCEDURE — 2580000003 HC RX 258: Performed by: EMERGENCY MEDICINE

## 2024-01-01 RX ORDER — MIDODRINE HYDROCHLORIDE 5 MG/1
5 TABLET ORAL DAILY
Status: DISCONTINUED | OUTPATIENT
Start: 2024-01-02 | End: 2024-01-02

## 2024-01-01 RX ORDER — ONDANSETRON 2 MG/ML
4 INJECTION INTRAMUSCULAR; INTRAVENOUS EVERY 6 HOURS PRN
Status: DISCONTINUED | OUTPATIENT
Start: 2024-01-01 | End: 2024-01-02 | Stop reason: HOSPADM

## 2024-01-01 RX ORDER — LACTOBACILLUS RHAMNOSUS GG 10B CELL
1 CAPSULE ORAL 2 TIMES DAILY
Status: DISCONTINUED | OUTPATIENT
Start: 2024-01-01 | End: 2024-01-02 | Stop reason: HOSPADM

## 2024-01-01 RX ORDER — MEMANTINE HYDROCHLORIDE 5 MG/1
10 TABLET ORAL 2 TIMES DAILY
Status: DISCONTINUED | OUTPATIENT
Start: 2024-01-01 | End: 2024-01-02 | Stop reason: HOSPADM

## 2024-01-01 RX ORDER — ACETAMINOPHEN 650 MG/1
650 SUPPOSITORY RECTAL EVERY 6 HOURS PRN
Status: DISCONTINUED | OUTPATIENT
Start: 2024-01-01 | End: 2024-01-02 | Stop reason: HOSPADM

## 2024-01-01 RX ORDER — IPRATROPIUM BROMIDE AND ALBUTEROL SULFATE 2.5; .5 MG/3ML; MG/3ML
1 SOLUTION RESPIRATORY (INHALATION)
Status: COMPLETED | OUTPATIENT
Start: 2024-01-01 | End: 2024-01-01

## 2024-01-01 RX ORDER — FUROSEMIDE 20 MG/1
20 TABLET ORAL DAILY PRN
Status: ON HOLD | COMMUNITY
End: 2024-01-02 | Stop reason: HOSPADM

## 2024-01-01 RX ORDER — POLYETHYLENE GLYCOL 3350 17 G/17G
17 POWDER, FOR SOLUTION ORAL DAILY PRN
Status: DISCONTINUED | OUTPATIENT
Start: 2024-01-01 | End: 2024-01-02 | Stop reason: HOSPADM

## 2024-01-01 RX ORDER — THYROID 60 MG/1
60 TABLET ORAL DAILY
Status: DISCONTINUED | OUTPATIENT
Start: 2024-01-02 | End: 2024-01-02 | Stop reason: HOSPADM

## 2024-01-01 RX ORDER — SODIUM CHLORIDE 0.9 % (FLUSH) 0.9 %
5-40 SYRINGE (ML) INJECTION PRN
Status: DISCONTINUED | OUTPATIENT
Start: 2024-01-01 | End: 2024-01-02 | Stop reason: HOSPADM

## 2024-01-01 RX ORDER — TRAZODONE HYDROCHLORIDE 50 MG/1
50 TABLET ORAL NIGHTLY
COMMUNITY

## 2024-01-01 RX ORDER — NICOTINE 14MG/24HR
1 PATCH, TRANSDERMAL 24 HOURS TRANSDERMAL 2 TIMES DAILY
COMMUNITY

## 2024-01-01 RX ORDER — GINSENG 100 MG
CAPSULE ORAL 2 TIMES DAILY
COMMUNITY

## 2024-01-01 RX ORDER — TRAZODONE HYDROCHLORIDE 50 MG/1
50 TABLET ORAL NIGHTLY
Status: DISCONTINUED | OUTPATIENT
Start: 2024-01-01 | End: 2024-01-02 | Stop reason: HOSPADM

## 2024-01-01 RX ORDER — SODIUM CHLORIDE 0.9 % (FLUSH) 0.9 %
5-40 SYRINGE (ML) INJECTION EVERY 12 HOURS SCHEDULED
Status: DISCONTINUED | OUTPATIENT
Start: 2024-01-01 | End: 2024-01-02 | Stop reason: HOSPADM

## 2024-01-01 RX ORDER — FUROSEMIDE 10 MG/ML
40 INJECTION INTRAMUSCULAR; INTRAVENOUS ONCE
Status: COMPLETED | OUTPATIENT
Start: 2024-01-01 | End: 2024-01-01

## 2024-01-01 RX ORDER — MIDODRINE HYDROCHLORIDE 5 MG/1
5 TABLET ORAL DAILY
COMMUNITY

## 2024-01-01 RX ORDER — ONDANSETRON 4 MG/1
4 TABLET, ORALLY DISINTEGRATING ORAL EVERY 8 HOURS PRN
Status: DISCONTINUED | OUTPATIENT
Start: 2024-01-01 | End: 2024-01-02 | Stop reason: HOSPADM

## 2024-01-01 RX ORDER — FUROSEMIDE 10 MG/ML
20 INJECTION INTRAMUSCULAR; INTRAVENOUS 2 TIMES DAILY
Status: DISCONTINUED | OUTPATIENT
Start: 2024-01-02 | End: 2024-01-02

## 2024-01-01 RX ORDER — ALLOPURINOL 300 MG/1
300 TABLET ORAL DAILY
Status: DISCONTINUED | OUTPATIENT
Start: 2024-01-02 | End: 2024-01-02 | Stop reason: HOSPADM

## 2024-01-01 RX ORDER — MONTELUKAST SODIUM 10 MG/1
10 TABLET ORAL DAILY
Status: DISCONTINUED | OUTPATIENT
Start: 2024-01-02 | End: 2024-01-02 | Stop reason: HOSPADM

## 2024-01-01 RX ORDER — GABAPENTIN 100 MG/1
100 CAPSULE ORAL
Status: DISCONTINUED | OUTPATIENT
Start: 2024-01-01 | End: 2024-01-02 | Stop reason: HOSPADM

## 2024-01-01 RX ORDER — ALBUTEROL SULFATE 2.5 MG/3ML
2.5 SOLUTION RESPIRATORY (INHALATION) EVERY 4 HOURS PRN
Status: DISCONTINUED | OUTPATIENT
Start: 2024-01-01 | End: 2024-01-02 | Stop reason: HOSPADM

## 2024-01-01 RX ORDER — ACETAMINOPHEN 325 MG/1
650 TABLET ORAL EVERY 6 HOURS PRN
Status: DISCONTINUED | OUTPATIENT
Start: 2024-01-01 | End: 2024-01-02 | Stop reason: HOSPADM

## 2024-01-01 RX ORDER — LANOLIN ALCOHOL/MO/W.PET/CERES
3 CREAM (GRAM) TOPICAL NIGHTLY
COMMUNITY

## 2024-01-01 RX ORDER — GABAPENTIN 100 MG/1
100 CAPSULE ORAL
COMMUNITY

## 2024-01-01 RX ORDER — METOPROLOL SUCCINATE 50 MG/1
25 TABLET, EXTENDED RELEASE ORAL DAILY
Status: DISCONTINUED | OUTPATIENT
Start: 2024-01-02 | End: 2024-01-02 | Stop reason: HOSPADM

## 2024-01-01 RX ADMIN — MEMANTINE 10 MG: 5 TABLET ORAL at 20:14

## 2024-01-01 RX ADMIN — MOMETASONE FUROATE AND FORMOTEROL FUMARATE DIHYDRATE 2 PUFF: 200; 5 AEROSOL RESPIRATORY (INHALATION) at 22:43

## 2024-01-01 RX ADMIN — SODIUM CHLORIDE, PRESERVATIVE FREE 10 ML: 5 INJECTION INTRAVENOUS at 20:36

## 2024-01-01 RX ADMIN — Medication 1 CAPSULE: at 20:14

## 2024-01-01 RX ADMIN — WATER 80 MG: 1 INJECTION INTRAMUSCULAR; INTRAVENOUS; SUBCUTANEOUS at 18:21

## 2024-01-01 RX ADMIN — TRAZODONE HYDROCHLORIDE 50 MG: 50 TABLET ORAL at 20:14

## 2024-01-01 RX ADMIN — GABAPENTIN 100 MG: 100 CAPSULE ORAL at 20:14

## 2024-01-01 RX ADMIN — SODIUM CHLORIDE, PRESERVATIVE FREE 10 ML: 5 INJECTION INTRAVENOUS at 20:35

## 2024-01-01 RX ADMIN — FUROSEMIDE 40 MG: 10 INJECTION, SOLUTION INTRAMUSCULAR; INTRAVENOUS at 18:26

## 2024-01-01 RX ADMIN — IPRATROPIUM BROMIDE AND ALBUTEROL SULFATE 1 DOSE: .5; 3 SOLUTION RESPIRATORY (INHALATION) at 18:20

## 2024-01-01 ASSESSMENT — PAIN - FUNCTIONAL ASSESSMENT: PAIN_FUNCTIONAL_ASSESSMENT: NONE - DENIES PAIN

## 2024-01-02 VITALS
HEART RATE: 84 BPM | OXYGEN SATURATION: 94 % | TEMPERATURE: 97 F | DIASTOLIC BLOOD PRESSURE: 88 MMHG | WEIGHT: 157 LBS | HEIGHT: 70 IN | BODY MASS INDEX: 22.48 KG/M2 | RESPIRATION RATE: 18 BRPM | SYSTOLIC BLOOD PRESSURE: 124 MMHG

## 2024-01-02 LAB
ANION GAP SERPL CALC-SCNC: 7 MMOL/L (ref 3–18)
BUN SERPL-MCNC: 23 MG/DL (ref 7–18)
BUN/CREAT SERPL: 27 (ref 12–20)
CA-I BLD-MCNC: 8.7 MG/DL (ref 8.5–10.1)
CHLORIDE SERPL-SCNC: 101 MMOL/L (ref 100–111)
CO2 SERPL-SCNC: 33 MMOL/L (ref 21–32)
CREAT SERPL-MCNC: 0.85 MG/DL (ref 0.6–1.3)
EKG DIAGNOSIS: NORMAL
EKG Q-T INTERVAL: 364 MS
EKG QRS DURATION: 96 MS
EKG QTC CALCULATION (BAZETT): 445 MS
EKG R AXIS: 30 DEGREES
EKG T AXIS: 20 DEGREES
EKG VENTRICULAR RATE: 90 BPM
ERYTHROCYTE [DISTWIDTH] IN BLOOD BY AUTOMATED COUNT: 17 % (ref 11.6–14.5)
GLUCOSE SERPL-MCNC: 192 MG/DL (ref 74–99)
HCT VFR BLD AUTO: 32.1 % (ref 35–45)
HGB BLD-MCNC: 10.1 G/DL (ref 12–16)
MAGNESIUM SERPL-MCNC: 2 MG/DL (ref 1.6–2.6)
MCH RBC QN AUTO: 24.5 PG (ref 24–34)
MCHC RBC AUTO-ENTMCNC: 31.5 G/DL (ref 31–37)
MCV RBC AUTO: 77.7 FL (ref 78–100)
NRBC # BLD: 0 K/UL (ref 0–0.01)
NRBC BLD-RTO: 0 PER 100 WBC
PLATELET # BLD AUTO: 189 K/UL (ref 135–420)
PMV BLD AUTO: 9.8 FL (ref 9.2–11.8)
POTASSIUM SERPL-SCNC: 3.5 MMOL/L (ref 3.5–5.5)
RBC # BLD AUTO: 4.13 M/UL (ref 4.2–5.3)
SODIUM SERPL-SCNC: 141 MMOL/L (ref 136–145)
WBC # BLD AUTO: 4.1 K/UL (ref 4.6–13.2)

## 2024-01-02 PROCEDURE — 83735 ASSAY OF MAGNESIUM: CPT

## 2024-01-02 PROCEDURE — 94640 AIRWAY INHALATION TREATMENT: CPT

## 2024-01-02 PROCEDURE — 6370000000 HC RX 637 (ALT 250 FOR IP): Performed by: NURSE PRACTITIONER

## 2024-01-02 PROCEDURE — 80048 BASIC METABOLIC PNL TOTAL CA: CPT

## 2024-01-02 PROCEDURE — 94761 N-INVAS EAR/PLS OXIMETRY MLT: CPT

## 2024-01-02 PROCEDURE — 36415 COLL VENOUS BLD VENIPUNCTURE: CPT

## 2024-01-02 PROCEDURE — 2580000003 HC RX 258: Performed by: NURSE PRACTITIONER

## 2024-01-02 PROCEDURE — 6370000000 HC RX 637 (ALT 250 FOR IP)

## 2024-01-02 PROCEDURE — 85027 COMPLETE CBC AUTOMATED: CPT

## 2024-01-02 PROCEDURE — 6360000002 HC RX W HCPCS: Performed by: NURSE PRACTITIONER

## 2024-01-02 RX ORDER — FUROSEMIDE 20 MG/1
20 TABLET ORAL DAILY
Status: DISCONTINUED | OUTPATIENT
Start: 2024-01-02 | End: 2024-01-02 | Stop reason: HOSPADM

## 2024-01-02 RX ORDER — METHYLPREDNISOLONE SODIUM SUCCINATE 40 MG/ML
INJECTION, POWDER, LYOPHILIZED, FOR SOLUTION INTRAMUSCULAR; INTRAVENOUS
Status: DISCONTINUED
Start: 2024-01-02 | End: 2024-01-02 | Stop reason: HOSPADM

## 2024-01-02 RX ORDER — WATER 10 ML/10ML
INJECTION INTRAMUSCULAR; INTRAVENOUS; SUBCUTANEOUS
Status: DISCONTINUED
Start: 2024-01-02 | End: 2024-01-02 | Stop reason: HOSPADM

## 2024-01-02 RX ORDER — POTASSIUM CHLORIDE 750 MG/1
20 TABLET, EXTENDED RELEASE ORAL DAILY
Status: DISCONTINUED | OUTPATIENT
Start: 2024-01-02 | End: 2024-01-02 | Stop reason: HOSPADM

## 2024-01-02 RX ORDER — MIDODRINE HYDROCHLORIDE 5 MG/1
2.5 TABLET ORAL DAILY
Status: DISCONTINUED | OUTPATIENT
Start: 2024-01-03 | End: 2024-01-02 | Stop reason: HOSPADM

## 2024-01-02 RX ORDER — FUROSEMIDE 20 MG/1
20 TABLET ORAL EVERY OTHER DAY
Qty: 15 TABLET | Refills: 3 | Status: SHIPPED | OUTPATIENT
Start: 2024-01-02

## 2024-01-02 RX ADMIN — POTASSIUM CHLORIDE 20 MEQ: 750 TABLET, EXTENDED RELEASE ORAL at 11:43

## 2024-01-02 RX ADMIN — THYROID 60 MG: 60 TABLET ORAL at 08:07

## 2024-01-02 RX ADMIN — ALLOPURINOL 300 MG: 300 TABLET ORAL at 07:58

## 2024-01-02 RX ADMIN — WATER 40 MG: 1 INJECTION INTRAMUSCULAR; INTRAVENOUS; SUBCUTANEOUS at 11:42

## 2024-01-02 RX ADMIN — SODIUM CHLORIDE, PRESERVATIVE FREE 10 ML: 5 INJECTION INTRAVENOUS at 07:59

## 2024-01-02 RX ADMIN — WATER 40 MG: 1 INJECTION INTRAMUSCULAR; INTRAVENOUS; SUBCUTANEOUS at 02:23

## 2024-01-02 RX ADMIN — FUROSEMIDE 20 MG: 10 INJECTION, SOLUTION INTRAMUSCULAR; INTRAVENOUS at 07:58

## 2024-01-02 RX ADMIN — METOPROLOL SUCCINATE 25 MG: 50 TABLET, EXTENDED RELEASE ORAL at 07:58

## 2024-01-02 RX ADMIN — Medication 1 CAPSULE: at 07:58

## 2024-01-02 RX ADMIN — MONTELUKAST 10 MG: 10 TABLET, FILM COATED ORAL at 07:58

## 2024-01-02 RX ADMIN — RIVAROXABAN 15 MG: 15 TABLET, FILM COATED ORAL at 07:58

## 2024-01-02 RX ADMIN — MIDODRINE HYDROCHLORIDE 5 MG: 5 TABLET ORAL at 07:58

## 2024-01-02 RX ADMIN — MOMETASONE FUROATE AND FORMOTEROL FUMARATE DIHYDRATE 2 PUFF: 200; 5 AEROSOL RESPIRATORY (INHALATION) at 07:51

## 2024-01-02 RX ADMIN — MEMANTINE 10 MG: 5 TABLET ORAL at 07:58

## 2024-01-02 RX ADMIN — SERTRALINE 50 MG: 50 TABLET, FILM COATED ORAL at 07:57

## 2024-01-02 NOTE — CONSULTS
650 mg, Rectal, Q6H PRN, Alina Monaco, APRN - CNP    albuterol (PROVENTIL) (2.5 MG/3ML) 0.083% nebulizer solution 2.5 mg, 2.5 mg, Nebulization, Q4H PRN, Alina Monaco, APRN - CNP    allopurinol (ZYLOPRIM) tablet 300 mg, 300 mg, Oral, Daily, Alina Monaco S, APRN - CNP, 300 mg at 01/02/24 0758    mometasone-formoterol (DULERA) 200-5 MCG/ACT inhaler 2 puff, 2 puff, Inhalation, BID RT, Alina Monaco S, APRN - CNP, 2 puff at 01/02/24 0751    gabapentin (NEURONTIN) capsule 100 mg, 100 mg, Oral, QHS, Alina Monaco, APRN - CNP, 100 mg at 01/01/24 2014    lactobacillus (CULTURELLE) capsule 1 capsule, 1 capsule, Oral, BID, Alina Monaco APRN - CNP, 1 capsule at 01/02/24 0758    memantine (NAMENDA) tablet 10 mg, 10 mg, Oral, BID, Alina Monaco APRN - CNP, 10 mg at 01/02/24 0758    metoprolol succinate (TOPROL XL) extended release tablet 25 mg, 25 mg, Oral, Daily, Alina Monaco APRN - CNP, 25 mg at 01/02/24 0758    midodrine (PROAMATINE) tablet 5 mg, 5 mg, Oral, Daily, Alina Monaco APRN - CNP, 5 mg at 01/02/24 0758    montelukast (SINGULAIR) tablet 10 mg, 10 mg, Oral, Daily, Alina Monaco, APRN - CNP, 10 mg at 01/02/24 0758    rivaroxaban (XARELTO) tablet 15 mg, 15 mg, Oral, Daily, Alina Monaco, APRN - CNP, 15 mg at 01/02/24 0758    thyroid (ARMOUR) tablet 60 mg (Patient Supplied), 60 mg, Oral, Daily, Alina Monaco APRN - CNP    sertraline (ZOLOFT) tablet 50 mg, 50 mg, Oral, Daily, Alina Monaco APRN - CNP, 50 mg at 01/02/24 0757    traZODone (DESYREL) tablet 50 mg, 50 mg, Oral, Nightly, Alina Monaco APRN - CNP, 50 mg at 01/01/24 2014    furosemide (LASIX) injection 20 mg, 20 mg, IntraVENous, BID, Alina Monaco APRN - CNP, 20 mg at 01/02/24 0758    methylPREDNISolone sodium succ (SOLU-MEDROL) 40 mg in sterile water 1 mL injection, 40 mg, IntraVENous, Q8H, Alina Monaco APRN - CNP, 40 mg at 01/02/24

## 2024-01-02 NOTE — ED NOTES
TRANSFER - OUT REPORT:    Verbal report given to Desi Davis LPN on Marline Calix  being transferred to [facility] for routine progression of patient care       Report consisted of patient's Situation, Background, Assessment and   Recommendations(SBAR).     Information from the following report(s) Nurse Handoff Report, ED Encounter Summary, ED SBAR, and MAR was reviewed with the receiving nurse.    [facility] Fall Assessment:    Presents to emergency department  because of falls (Syncope, seizure, or loss of consciousness): No  Age > 70: Yes  Altered Mental Status, Intoxication with alcohol or substance confusion (Disorientation, impaired judgment, poor safety awaremess, or inability to follow instructions): No  Impaired Mobility: Ambulates or transfers with assistive devices or assistance; Unable to ambulate or transer.: No             Lines:   Peripheral IV 01/01/24 Right Antecubital (Active)   Site Assessment Clean, dry & intact 01/01/24 1611   Line Status Blood return noted 01/01/24 1611   Phlebitis Assessment No symptoms 01/01/24 1611   Infiltration Assessment 0 01/01/24 1611        Opportunity for questions and clarification was provided.      Patient transported with:  Monitor and Registered Nurse

## 2024-01-02 NOTE — PROGRESS NOTES
1855 - Received report on pt coming to room 244 from ED nurse.     1915 - Pt arrived to room 244 and transferred with minimal assistance from wheelchair to bed. Pt in bed. Pt's daughter at bedside and assisted in answering admission questions. Pt is A/ox3 and easily reoriented at this time. Pt is Y[place] Dehe. Charge nurse will do admission assessment. CSS provided to pt for dinner. Pt tolerated well.     1945 - Rounded on pt, assisted pt to bedside commode and back to bed. Pt voided.     2014 - Rounded on pt, pt resting in bed. HS medications administered as ordered, pt tolerated well. Purewick applied to pt. Pt tolerated well. CBWR.

## 2024-01-02 NOTE — PLAN OF CARE
Problem: Skin/Tissue Integrity  Goal: Absence of new skin breakdown  Description: 1.  Monitor for areas of redness and/or skin breakdown  2.  Assess vascular access sites hourly  3.  Every 4-6 hours minimum:  Change oxygen saturation probe site  4.  Every 4-6 hours:  If on nasal continuous positive airway pressure, respiratory therapy assess nares and determine need for appliance change or resting period.  1/2/2024 1154 by Tara Sousa RN  Outcome: Adequate for Discharge  1/1/2024 2244 by Tara Sousa RN  Outcome: Progressing     Problem: Safety - Adult  Goal: Free from fall injury  1/2/2024 1154 by Tara Sousa RN  Outcome: Adequate for Discharge  1/1/2024 2244 by Tara Sousa RN  Outcome: Progressing     Problem: ABCDS Injury Assessment  Goal: Absence of physical injury  1/2/2024 1154 by Tara Sousa RN  Outcome: Adequate for Discharge  1/1/2024 2244 by Tara Sousa RN  Outcome: Progressing     Problem: Discharge Planning  Goal: Discharge to home or other facility with appropriate resources  1/2/2024 1154 by Tara Sousa RN  Outcome: Adequate for Discharge  1/1/2024 2244 by Tara Sousa RN  Outcome: Progressing     Problem: Chronic Conditions and Co-morbidities  Goal: Patient's chronic conditions and co-morbidity symptoms are monitored and maintained or improved  Outcome: Adequate for Discharge

## 2024-01-02 NOTE — PROGRESS NOTES
2030- Change in care report received from Desi. Patient was resting in bed with eyes closed, responded to verbal stimuli appropriately. Patient denied pain at this time. Patient repositions self as needed. Purewick in place. Call bell within reach.     2353- Patient was assessed as noted in flowsheet. Patient was sleeping and responded to tactile stimuli appropriately. Patient denied pain at this time. Purewick is in place draining clear yellow urine. Patient repositions self in bed as needed.     0145- Rounding completed, patient was sleeping and showed no signs of pain or distress.     0400- Patient was reassessed for changes, none noted.     0647- Patient was assessed as noted. Patient was awake and responsive. Patient continues to reposition self as needed. Purewick is functioning correctly. Call bell is within reach.     0758- Patient was given morning meds with sips of water, tolerated well.     0945- Rounding completed, patient was resting in bed and showed no signs of pain or distress at this time.     1045- Patient reported purewick failed and her sheets were wet. Patient was placed in the bedside chair with minimal assistance. Patient was cleaned. Patient requested to stay in the chair at this time. Family remains at the bedside.     1259- Report called to Guadalupe at the [facility].

## 2024-01-02 NOTE — CARE COORDINATION
Case Management Assessment  Initial Evaluation    Date/Time of Evaluation: 1/2/2024 9:59 AM  Assessment Completed by: Brooek Caro RN    If patient is discharged prior to next notation, then this note serves as note for discharge by case management.    Patient Name: Marline Calix                   YOB: 1934  Diagnosis: Mild intermittent asthma with exacerbation [J45.21]  Acute on chronic combined systolic (congestive) and diastolic (congestive) heart failure (HCC) [I50.43]  Mild intermittent asthma with acute exacerbation [J45.21]  Acute on chronic combined systolic and diastolic CHF (congestive heart failure) (HCC) [I50.43]                   Date / Time: 1/1/2024  3:51 PM    Patient Admission Status: Inpatient   Readmission Risk (Low < 19, Mod (19-27), High > 27): Readmission Risk Score: 16.2    Current PCP: Dayana Lara MD  PCP verified by CM? Yes (Cristiane Tam)    Chart Reviewed: Yes      History Provided by: Child/Family  Patient Orientation: Other (see comment) (Dementia)    Patient Cognition: Dementia / Early Alzheimer's    Hospitalization in the last 30 days (Readmission):  No    If yes, Readmission Assessment in  Navigator will be completed.    Advance Directives:      Code Status: DNR   Patient's Primary Decision Maker is: Legal Next of Kin    Primary Decision Maker: George Ramos - Child - [phone]    Discharge Planning:    [facility] ([facility] Type of Home: (P) Long-Term Care  Primary Care Giver: Other (Comment[facility])  Patient Support Systems include: Children   Current Financial resources: (P) Medicare  Current community resources: (P) None  Current services prior to admission: (P) None            Current DME:              Type of Home Care services:  (P) None    ADLS  Prior functional level: (P) Assistance with the following:, Bathing, Dressing  Current functional level: (P) Assistance with the

## 2024-01-02 NOTE — ED PROVIDER NOTES
mouth in the morning and at bedtime    SERTRALINE (ZOLOFT) 50 MG TABLET    Take 1 tablet by mouth daily    THYROID (ARMOUR) 60 MG TABLET    Take 1 tablet by mouth daily    TRAZODONE (DESYREL) 50 MG TABLET    Take 1 tablet by mouth nightly    VITAMIN B-12 (CYANOCOBALAMIN) 500 MCG TABLET    Take 1 tablet by mouth daily       SCREENINGS               No data recorded         PHYSICAL EXAM      Vitals:    01/01/24 1826 01/01/24 1830 01/01/24 1843 01/01/24 1845   BP: (!) 149/100 (!) 149/95  (!) 157/89   Pulse:  90 99 98   Resp:  19 28 22   Temp:       TempSrc:       SpO2:  100% 92%    Weight:       Height:         Physical Exam    Nursing notes and vital signs reviewed    Constitutional: Pleasant elderly female who appears in no acute distress.  Head: Normocephalic, Atraumatic  Eyes: EOMI  Neck: Supple  Cardiovascular: Regular rate and rhythm, no murmurs, rubs, or gallops  Chest: Normal work of breathing and chest excursion bilaterally  Lungs: Rales both bases  Abdomen: Soft, non tender, non distended, normoactive bowel sounds  Back: No evidence of trauma or deformity  Extremities: No evidence of trauma or deformity,trace LE edema  Skin: Warm and dry, normal cap refill  Neuro: Alert and appropriate, CN intact, normal speech, strength and sensation full and symmetric bilaterally, normal gait, normal coordination  Psychiatric: Normal mood and affect     DIAGNOSTIC RESULTS   LABS:     Labs Reviewed   CBC WITH AUTO DIFFERENTIAL - Abnormal; Notable for the following components:       Result Value    Hemoglobin 10.4 (*)     Hematocrit 33.9 (*)     MCHC 30.7 (*)     RDW 17.1 (*)     Neutrophils % 78 (*)     Lymphocytes % 14 (*)     All other components within normal limits   COMPREHENSIVE METABOLIC PANEL - Abnormal; Notable for the following components:    Glucose 110 (*)     BUN 28 (*)     Bun/Cre Ratio 26 (*)     Est, Glom Filt Rate 50 (*)     All other components within normal limits   BRAIN NATRIURETIC PEPTIDE - Abnormal;

## 2024-01-02 NOTE — H&P
History and Physical    Subjective:     Marline Calix is a 89 y.o. elderly  female with a past medical history significant for CHF (unknown EF), HTN, HLP, atrial fibrillation (on Xarelto), asthma, dementia, hypothyroidism and gout, patient presents from the [facility] unit with a chief complaint of shortness of breath.  Patient reports that the shortness of breath started last week and has not been ongoing, but worsening over the last 2 days, other accompanying symptoms includes generalized weakness and fatigue. At this time patient denies any chest pain, palpitations, headaches, dizziness, coughing, abdominal pain, nausea, vomiting, and diarrhea. Patient daughter at the bedside reports she noticed severe shortness of breath with minimal activity, increasing abdominal distention, and increasing lower extremity edema.  Patient is on Lasix 20 mg daily as needed at [facility]-term facility, but only received this dose yesterday. In the ED found with a BNP of 5229, chest x-ray showing mild interstitial edema, EKG showing atrial fibrillation, H&H 10.4/33.9.  While in the ED patient did receive 40 mg of IV Lasix, a dual neb, and IV Solu-Medrol. Discussed case with ED provider, hospital medicine will admit the patient for further evaluation and treatment. Patient assessed in the ED, at the bedside, patient is alert and oriented, there is no acute distress noted. Patient agrees to admission for a diagnosis of acute on chronic combined heart failure and asthma exacerbation, treatment to include IV diuresing, strict intake and output monitoring, daily weights, cardiology consultation and IV steroids.    Admit to medical telemetry unit.    Past Medical History:   Diagnosis Date    Anxiety     Arthropathy     Asthma     Atrial fibrillation (HCC)     CHF (congestive heart failure) (HCC)     Community acquired pneumonia     Dementia (HCC)     Gout     Hypertension     Ill-defined condition     dementia

## 2024-01-02 NOTE — PLAN OF CARE
Problem: Skin/Tissue Integrity  Goal: Absence of new skin breakdown  Description: 1.  Monitor for areas of redness and/or skin breakdown  2.  Assess vascular access sites hourly  3.  Every 4-6 hours minimum:  Change oxygen saturation probe site  4.  Every 4-6 hours:  If on nasal continuous positive airway pressure, respiratory therapy assess nares and determine need for appliance change or resting period.  Outcome: Progressing     Problem: Safety - Adult  Goal: Free from fall injury  Outcome: Progressing     Problem: ABCDS Injury Assessment  Goal: Absence of physical injury  Outcome: Progressing     Problem: Discharge Planning  Goal: Discharge to home or other facility with appropriate resources  Outcome: Progressing

## 2024-01-02 NOTE — PROGRESS NOTES
Patient discharged by BRIANA oSusa RN    1315- Patient transported off of unit via wheelchair by CATIE Godoy

## 2024-01-02 NOTE — DISCHARGE SUMMARY
interstitial edema          Time : 45 min - spent on Chart and recent lab / Image review, medical exam, Discussion with patient and family, charting and discussion / orders with the nurse and staff.     Jens Jefferson MD   Hospitalist

## 2024-04-24 ENCOUNTER — HOSPITAL ENCOUNTER (OUTPATIENT)
Age: 89
Setting detail: SPECIMEN
Discharge: HOME | End: 2024-04-27
Payer: MEDICARE

## 2024-04-24 LAB
APPEARANCE UR: ABNORMAL
BACTERIA URNS QL MICRO: ABNORMAL /HPF
BILIRUB UR QL: NEGATIVE
COLOR UR: YELLOW
EPITH CASTS URNS QL MICRO: ABNORMAL /LPF (ref 0–20)
GLUCOSE UR STRIP.AUTO-MCNC: NEGATIVE MG/DL
HGB UR QL STRIP: NEGATIVE
KETONES UR QL STRIP.AUTO: NEGATIVE MG/DL
LEUKOCYTE ESTERASE UR QL STRIP.AUTO: ABNORMAL
NITRITE UR QL STRIP.AUTO: POSITIVE
PH UR STRIP: 7 (ref 5–8)
PROT UR STRIP-MCNC: NEGATIVE MG/DL
RBC #/AREA URNS HPF: ABNORMAL /HPF (ref 0–2)
SP GR UR REFRACTOMETRY: 1.02 (ref 1–1.03)
UROBILINOGEN UR QL STRIP.AUTO: 1 EU/DL (ref 0.2–1)
WBC URNS QL MICRO: ABNORMAL /HPF (ref 0–4)

## 2024-04-24 PROCEDURE — 87086 URINE CULTURE/COLONY COUNT: CPT

## 2024-04-24 PROCEDURE — 81001 URINALYSIS AUTO W/SCOPE: CPT

## 2024-04-25 LAB
BACTERIA SPEC CULT: NORMAL
COLONY COUNT, CNT: NORMAL
Lab: NORMAL

## 2024-05-14 ENCOUNTER — HOSPITAL ENCOUNTER (OUTPATIENT)
Age: 89
Setting detail: SPECIMEN
Discharge: HOME | End: 2024-05-17
Payer: MEDICARE

## 2024-05-14 LAB
ALBUMIN SERPL-MCNC: 3.4 G/DL (ref 3.4–5)
ALBUMIN/GLOB SERPL: 1 (ref 0.8–1.7)
ALP SERPL-CCNC: 97 U/L (ref 45–117)
ALT SERPL-CCNC: 12 U/L (ref 13–56)
ANION GAP SERPL CALC-SCNC: 3 MMOL/L (ref 3–18)
AST SERPL W P-5'-P-CCNC: 15 U/L (ref 10–38)
BASOPHILS # BLD: 0 K/UL (ref 0–0.1)
BASOPHILS NFR BLD: 1 % (ref 0–2)
BILIRUB SERPL-MCNC: 0.6 MG/DL (ref 0.2–1)
BUN SERPL-MCNC: 32 MG/DL (ref 7–18)
BUN/CREAT SERPL: 39 (ref 12–20)
CA-I BLD-MCNC: 9.2 MG/DL (ref 8.5–10.1)
CHLORIDE SERPL-SCNC: 105 MMOL/L (ref 100–111)
CO2 SERPL-SCNC: 33 MMOL/L (ref 21–32)
CREAT SERPL-MCNC: 0.82 MG/DL (ref 0.6–1.3)
DIFFERENTIAL METHOD BLD: ABNORMAL
EOSINOPHIL # BLD: 0.3 K/UL (ref 0–0.4)
EOSINOPHIL NFR BLD: 5 % (ref 0–5)
ERYTHROCYTE [DISTWIDTH] IN BLOOD BY AUTOMATED COUNT: 19.9 % (ref 11.6–14.5)
GLOBULIN SER CALC-MCNC: 3.3 G/DL (ref 2–4)
GLUCOSE SERPL-MCNC: 81 MG/DL (ref 74–99)
HCT VFR BLD AUTO: 37.1 % (ref 35–45)
HGB BLD-MCNC: 11.3 G/DL (ref 12–16)
IMM GRANULOCYTES # BLD AUTO: 0 K/UL (ref 0–0.04)
IMM GRANULOCYTES NFR BLD AUTO: 0 % (ref 0–0.5)
LYMPHOCYTES # BLD: 2 K/UL (ref 0.9–3.6)
LYMPHOCYTES NFR BLD: 37 % (ref 21–52)
MCH RBC QN AUTO: 24.6 PG (ref 24–34)
MCHC RBC AUTO-ENTMCNC: 30.5 G/DL (ref 31–37)
MCV RBC AUTO: 80.7 FL (ref 78–100)
MONOCYTES # BLD: 0.5 K/UL (ref 0.05–1.2)
MONOCYTES NFR BLD: 8 % (ref 3–10)
NEUTS SEG # BLD: 2.6 K/UL (ref 1.8–8)
NEUTS SEG NFR BLD: 49 % (ref 40–73)
NRBC # BLD: 0 K/UL (ref 0–0.01)
NRBC BLD-RTO: 0 PER 100 WBC
PLATELET # BLD AUTO: 183 K/UL (ref 135–420)
PMV BLD AUTO: 10.1 FL (ref 9.2–11.8)
POTASSIUM SERPL-SCNC: 4.5 MMOL/L (ref 3.5–5.5)
PROT SERPL-MCNC: 6.7 G/DL (ref 6.4–8.2)
RBC # BLD AUTO: 4.6 M/UL (ref 4.2–5.3)
SODIUM SERPL-SCNC: 141 MMOL/L (ref 136–145)
WBC # BLD AUTO: 5.4 K/UL (ref 4.6–13.2)

## 2024-05-14 PROCEDURE — 80053 COMPREHEN METABOLIC PANEL: CPT

## 2024-05-14 PROCEDURE — 85025 COMPLETE CBC W/AUTO DIFF WBC: CPT

## 2024-07-03 ENCOUNTER — HOSPITAL ENCOUNTER (OUTPATIENT)
Age: 89
Setting detail: SPECIMEN
Discharge: HOME | End: 2024-07-06

## 2024-07-03 LAB — TSH SERPL DL<=0.05 MIU/L-ACNC: 2.01 UIU/ML (ref 0.36–3.74)

## 2024-07-03 PROCEDURE — 84443 ASSAY THYROID STIM HORMONE: CPT

## 2024-08-01 ENCOUNTER — HOSPITAL ENCOUNTER (EMERGENCY)
Age: 89
Discharge: HOME | End: 2024-08-01
Attending: EMERGENCY MEDICINE
Payer: MEDICARE

## 2024-08-01 ENCOUNTER — APPOINTMENT (OUTPATIENT)
Age: 89
End: 2024-08-01
Payer: MEDICARE

## 2024-08-01 VITALS
SYSTOLIC BLOOD PRESSURE: 134 MMHG | HEART RATE: 68 BPM | HEIGHT: 70 IN | OXYGEN SATURATION: 95 % | RESPIRATION RATE: 18 BRPM | BODY MASS INDEX: 22.9 KG/M2 | TEMPERATURE: 97.8 F | DIASTOLIC BLOOD PRESSURE: 86 MMHG | WEIGHT: 160 LBS

## 2024-08-01 DIAGNOSIS — M19.172 POST-TRAUMATIC OSTEOARTHRITIS OF LEFT ANKLE: Primary | ICD-10-CM

## 2024-08-01 PROCEDURE — 99283 EMERGENCY DEPT VISIT LOW MDM: CPT

## 2024-08-01 PROCEDURE — 73610 X-RAY EXAM OF ANKLE: CPT

## 2024-08-01 ASSESSMENT — PAIN SCALES - GENERAL: PAINLEVEL_OUTOF10: 7

## 2024-08-01 ASSESSMENT — LIFESTYLE VARIABLES
HOW MANY STANDARD DRINKS CONTAINING ALCOHOL DO YOU HAVE ON A TYPICAL DAY: PATIENT DOES NOT DRINK
HOW OFTEN DO YOU HAVE A DRINK CONTAINING ALCOHOL: NEVER

## 2024-08-01 ASSESSMENT — PAIN DESCRIPTION - ORIENTATION: ORIENTATION: LEFT

## 2024-08-01 ASSESSMENT — PAIN - FUNCTIONAL ASSESSMENT: PAIN_FUNCTIONAL_ASSESSMENT: 0-10

## 2024-08-01 ASSESSMENT — PAIN DESCRIPTION - PAIN TYPE: TYPE: ACUTE PAIN

## 2024-08-01 ASSESSMENT — PAIN DESCRIPTION - LOCATION: LOCATION: ANKLE;FOOT

## 2024-08-01 NOTE — ED PROVIDER NOTES
[facility] EMERGENCY DEPT  EMERGENCY DEPARTMENT HISTORY AND PHYSICAL EXAM      Date: 8/1/2024  Patient Name: Marline Calix  MRN: [id number]  Birthdate 1934  Date of evaluation: 8/1/2024  Provider: Tim Galdamez MD   Note Started: 5:37 PM EDT 8/1/24    HISTORY OF PRESENT ILLNESS     Chief Complaint   Patient presents with    Ankle Pain       History Provided By: Patient    HPI: Marline Calix is a 89 y.o. female states broke left ankle several years ago. Noted pain yesterday in ankle and leg with redness. Today at lunch looked bruised and discolored and noted to be limping today and almost fell.  Patient has dementia and is on a memory care unit.  She does not recall any falls and none were witnessed.    PAST MEDICAL HISTORY   Past Medical History:  Past Medical History:   Diagnosis Date    Anxiety     Arthropathy     Asthma     Atrial fibrillation (HCC)     CHF (congestive heart failure) (HCC)     Community acquired pneumonia     Dementia (HCC)     Gout     Hypertension     Ill-defined condition     dementia    Thyroid disease        Past Surgical History:  Past Surgical History:   Procedure Laterality Date    HIP SURGERY Left 8/17/2023    HIP HEMIARTHROPLASTY LEFT performed by Bj Barreto MD at [facility] MAIN OR    ORTHOPEDIC SURGERY      left ankle orif    TONSILLECTOMY         Family History:  History reviewed. No pertinent family history.    Social History:  Social History     Tobacco Use    Smoking status: Never    Smokeless tobacco: Never   Vaping Use    Vaping Use: Never used   Substance Use Topics    Alcohol use: Never    Drug use: Never       Allergies:  Allergies   Allergen Reactions    Ciprofloxacin Hcl Other (See Comments)    Meloxicam Other (See Comments)    Penicillin G Other (See Comments)    Shellfish-Derived Products      Other Reaction(s): unknown    Shrimp Extract Other (See Comments)    Telithromycin Other (See Comments)       PCP: Cristiane Steele, DAVE - CNP    Current Meds:   No

## 2024-09-06 ENCOUNTER — HOSPITAL ENCOUNTER (OUTPATIENT)
Age: 89
Discharge: HOME | End: 2024-09-09
Payer: MEDICARE

## 2024-09-06 ENCOUNTER — TRANSCRIBE ORDERS (OUTPATIENT)
Age: 89
End: 2024-09-06

## 2024-09-06 DIAGNOSIS — M25.561 RIGHT KNEE PAIN, UNSPECIFIED CHRONICITY: Primary | ICD-10-CM

## 2024-09-06 DIAGNOSIS — M25.561 RIGHT KNEE PAIN, UNSPECIFIED CHRONICITY: ICD-10-CM

## 2024-09-06 PROCEDURE — 73560 X-RAY EXAM OF KNEE 1 OR 2: CPT

## 2024-10-31 ENCOUNTER — HOSPITAL ENCOUNTER (OUTPATIENT)
Age: 89
Setting detail: SPECIMEN
Discharge: HOME | End: 2024-11-03
Payer: MEDICARE

## 2024-10-31 LAB
ALBUMIN SERPL-MCNC: 3.4 G/DL (ref 3.4–5)
ALBUMIN/GLOB SERPL: 1 (ref 0.8–1.7)
ALP SERPL-CCNC: 95 U/L (ref 45–117)
ALT SERPL-CCNC: 16 U/L (ref 13–56)
ANION GAP SERPL CALC-SCNC: 4 MMOL/L (ref 3–18)
APPEARANCE UR: CLEAR
AST SERPL W P-5'-P-CCNC: 20 U/L (ref 10–38)
BACTERIA URNS QL MICRO: ABNORMAL /HPF
BILIRUB SERPL-MCNC: 0.6 MG/DL (ref 0.2–1)
BILIRUB UR QL: NEGATIVE
BUN SERPL-MCNC: 30 MG/DL (ref 7–18)
BUN/CREAT SERPL: 32 (ref 12–20)
CA-I BLD-MCNC: 9.1 MG/DL (ref 8.5–10.1)
CHLORIDE SERPL-SCNC: 108 MMOL/L (ref 100–111)
CO2 SERPL-SCNC: 32 MMOL/L (ref 21–32)
COLOR UR: YELLOW
CREAT SERPL-MCNC: 0.94 MG/DL (ref 0.6–1.3)
EPITH CASTS URNS QL MICRO: ABNORMAL /LPF (ref 0–20)
ERYTHROCYTE [DISTWIDTH] IN BLOOD BY AUTOMATED COUNT: 16.3 % (ref 11.6–14.5)
GLOBULIN SER CALC-MCNC: 3.5 G/DL (ref 2–4)
GLUCOSE SERPL-MCNC: 123 MG/DL (ref 74–99)
GLUCOSE UR STRIP.AUTO-MCNC: NEGATIVE MG/DL
HCT VFR BLD AUTO: 40.8 % (ref 35–45)
HGB BLD-MCNC: 12.9 G/DL (ref 12–16)
HGB UR QL STRIP: NEGATIVE
KETONES UR QL STRIP.AUTO: NEGATIVE MG/DL
LEUKOCYTE ESTERASE UR QL STRIP.AUTO: ABNORMAL
MCH RBC QN AUTO: 28.4 PG (ref 24–34)
MCHC RBC AUTO-ENTMCNC: 31.6 G/DL (ref 31–37)
MCV RBC AUTO: 89.9 FL (ref 78–100)
NITRITE UR QL STRIP.AUTO: POSITIVE
NRBC # BLD: 0 K/UL (ref 0–0.01)
NRBC BLD-RTO: 0 PER 100 WBC
PH UR STRIP: 6 (ref 5–8)
PLATELET # BLD AUTO: 197 K/UL (ref 135–420)
PMV BLD AUTO: 11.3 FL (ref 9.2–11.8)
POTASSIUM SERPL-SCNC: 4.5 MMOL/L (ref 3.5–5.5)
PROT SERPL-MCNC: 6.9 G/DL (ref 6.4–8.2)
PROT UR STRIP-MCNC: NEGATIVE MG/DL
RBC # BLD AUTO: 4.54 M/UL (ref 4.2–5.3)
RBC #/AREA URNS HPF: ABNORMAL /HPF (ref 0–2)
SODIUM SERPL-SCNC: 144 MMOL/L (ref 136–145)
SP GR UR REFRACTOMETRY: 1.02 (ref 1–1.03)
UROBILINOGEN UR QL STRIP.AUTO: 1 EU/DL (ref 0.2–1)
WBC # BLD AUTO: 7.8 K/UL (ref 4.6–13.2)
WBC URNS QL MICRO: ABNORMAL /HPF (ref 0–4)

## 2024-10-31 PROCEDURE — 87186 SC STD MICRODIL/AGAR DIL: CPT

## 2024-10-31 PROCEDURE — 85027 COMPLETE CBC AUTOMATED: CPT

## 2024-10-31 PROCEDURE — 87086 URINE CULTURE/COLONY COUNT: CPT

## 2024-10-31 PROCEDURE — 81001 URINALYSIS AUTO W/SCOPE: CPT

## 2024-10-31 PROCEDURE — 87088 URINE BACTERIA CULTURE: CPT

## 2024-10-31 PROCEDURE — 80053 COMPREHEN METABOLIC PANEL: CPT

## 2024-11-01 ENCOUNTER — HOSPITAL ENCOUNTER (OUTPATIENT)
Age: 89
Setting detail: SPECIMEN
Discharge: HOME | End: 2024-11-04
Payer: MEDICARE

## 2024-11-01 LAB — AMMONIA PLAS-SCNC: <10 UMOL/L (ref 11–32)

## 2024-11-01 PROCEDURE — 36415 COLL VENOUS BLD VENIPUNCTURE: CPT

## 2024-11-01 PROCEDURE — 82140 ASSAY OF AMMONIA: CPT

## 2024-11-03 LAB
BACTERIA SPEC CULT: ABNORMAL
COLONY COUNT, CNT: ABNORMAL
Lab: ABNORMAL

## 2025-01-02 NOTE — PLAN OF CARE
Disp Refills Start End    norethindrone (MICRONOR) 0.35 MG tablet 1 packet 11 4/1/2024 --    Sig - Route: Take 1 tablet by mouth daily. - Oral    Sent to pharmacy as: Norethindrone 0.35 MG Oral Tablet      Pt should have refills until April.  No need for refill at this time.     Problem: Physical Therapy - Adult  Goal: By Discharge: Performs mobility at highest level of function for planned discharge setting. See evaluation for individualized goals. Description: FUNCTIONAL STATUS PRIOR TO ADMISSION: Patient was modified independent using a rollator for functional mobility. HOME SUPPORT PRIOR TO ADMISSION: Per pt, the patient lived with spouse and did not require assistance. However, pt with cognition deficits, per chart review and CM note, pt is a resident of [facility] ([place] [address] and required assistance with ADL's. Physical Therapy Goals  Initiated 8/18/2023  Pt stated goal: To go home  Pt will be I with [facility] in 7 days. Pt will perform bed mobility with Minimal Assist in 7 days. Pt will perform transfers with Minimal Assist in 7 days. Pt will amb -50 feet with LRAD safely with Minimal Assist in 7 days. Pt will verbalize and demonstrate compliance with posterior precautions  in 7 days. Pt will demonstrate improvement in sitting and standing balance to good in 7 days.     Outcome: Progressing    PHYSICAL THERAPY EVALUATION  Patient: Kelsie Lopez (49 y.o. female)  Date: 8/18/2023  Primary Diagnosis: Other fracture of left femur, initial encounter for closed fracture ([address]) [S72.8X2A]  Closed fracture of neck of left femur, initial encounter ([address]) [S72.002A]  Procedure(s) (LRB):  HIP HEMIARTHROPLASTY LEFT (Left) 1 Day Post-Op   Precautions: Fall Risk   Left Lower Extremity Weight Bearing: Weight Bearing As Tolerated         Hip Precautions: Posterior hip precautions, No hip flexion > 90 degrees, No hip internal rotation, No ADduction, posterior hip precautions for 3 months  In place during session: Peripheral IV, Nasal Cannula 2L, and External Catheter  ASSESSMENT  Pt is a 80 y.o. female with PMH of history of atrial fibrillation, HFrEF, hypertension, and asthma presented to [facility] with following a fall admitted on 8/15/2023 for closed fracture neck of L Verbalized understanding;Demonstrated understanding;Continued education needed    PT/OT sessions occurred together for increased safety of pt and clinician.       Thank you for this referral.  Isaak Ellsworth, PT  Minutes: 62

## 2025-01-13 ENCOUNTER — TRANSCRIBE ORDERS (OUTPATIENT)
Age: 89
End: 2025-01-13

## 2025-01-13 ENCOUNTER — HOSPITAL ENCOUNTER (OUTPATIENT)
Age: 89
Setting detail: SPECIMEN
Discharge: HOME | End: 2025-01-16
Payer: MEDICARE

## 2025-01-13 DIAGNOSIS — R41.0 SUBACUTE DELIRIUM: Primary | ICD-10-CM

## 2025-01-13 LAB
APPEARANCE UR: CLEAR
BACTERIA URNS QL MICRO: ABNORMAL /HPF
BILIRUB UR QL: NEGATIVE
COLOR UR: YELLOW
EPITH CASTS URNS QL MICRO: ABNORMAL /LPF (ref 0–20)
GLUCOSE UR STRIP.AUTO-MCNC: NEGATIVE MG/DL
HGB UR QL STRIP: NEGATIVE
KETONES UR QL STRIP.AUTO: NEGATIVE MG/DL
LEUKOCYTE ESTERASE UR QL STRIP.AUTO: ABNORMAL
MUCOUS THREADS URNS QL MICRO: NEGATIVE /LPF
NITRITE UR QL STRIP.AUTO: POSITIVE
PH UR STRIP: 6 (ref 5–8)
PROT UR STRIP-MCNC: NEGATIVE MG/DL
RBC #/AREA URNS HPF: ABNORMAL /HPF (ref 0–2)
SP GR UR REFRACTOMETRY: 1.01 (ref 1–1.03)
UROBILINOGEN UR QL STRIP.AUTO: 0.2 EU/DL (ref 0.2–1)
WBC URNS QL MICRO: >100 /HPF (ref 0–4)

## 2025-01-13 PROCEDURE — 87186 SC STD MICRODIL/AGAR DIL: CPT

## 2025-01-13 PROCEDURE — 87086 URINE CULTURE/COLONY COUNT: CPT

## 2025-01-13 PROCEDURE — 87088 URINE BACTERIA CULTURE: CPT

## 2025-01-13 PROCEDURE — 81001 URINALYSIS AUTO W/SCOPE: CPT

## 2025-01-15 LAB
BACTERIA SPEC CULT: ABNORMAL
COLONY COUNT, CNT: ABNORMAL
Lab: ABNORMAL

## 2025-03-19 ENCOUNTER — HOSPITAL ENCOUNTER (OUTPATIENT)
Age: 89
Setting detail: SPECIMEN
Discharge: HOME | End: 2025-03-22
Payer: MEDICARE

## 2025-03-19 LAB
25(OH)D3 SERPL-MCNC: 94.8 NG/ML (ref 30–100)
ALBUMIN SERPL-MCNC: 3.1 G/DL (ref 3.4–5)
ALBUMIN/GLOB SERPL: 0.9 (ref 0.8–1.7)
ALP SERPL-CCNC: 79 U/L (ref 45–117)
ALT SERPL-CCNC: 10 U/L (ref 13–56)
ANION GAP SERPL CALC-SCNC: 4 MMOL/L (ref 3–18)
AST SERPL W P-5'-P-CCNC: 12 U/L (ref 10–38)
BASOPHILS # BLD: 0.04 K/UL (ref 0–0.1)
BASOPHILS NFR BLD: 0.5 % (ref 0–2)
BILIRUB SERPL-MCNC: 1 MG/DL (ref 0.2–1)
BUN SERPL-MCNC: 25 MG/DL (ref 7–18)
BUN/CREAT SERPL: 27 (ref 12–20)
CA-I BLD-MCNC: 8.7 MG/DL (ref 8.5–10.1)
CHLORIDE SERPL-SCNC: 100 MMOL/L (ref 100–111)
CO2 SERPL-SCNC: 32 MMOL/L (ref 21–32)
CREAT SERPL-MCNC: 0.91 MG/DL (ref 0.6–1.3)
DIFFERENTIAL METHOD BLD: ABNORMAL
EOSINOPHIL # BLD: 0.12 K/UL (ref 0–0.4)
EOSINOPHIL NFR BLD: 1.4 % (ref 0–5)
ERYTHROCYTE [DISTWIDTH] IN BLOOD BY AUTOMATED COUNT: 15.3 % (ref 11.6–14.5)
EST. AVERAGE GLUCOSE BLD GHB EST-MCNC: 131 MG/DL
GLOBULIN SER CALC-MCNC: 3.6 G/DL (ref 2–4)
GLUCOSE SERPL-MCNC: 115 MG/DL (ref 74–99)
HBA1C MFR BLD: 6.2 % (ref 4.2–5.6)
HCT VFR BLD AUTO: 37.7 % (ref 35–45)
HGB BLD-MCNC: 12.5 G/DL (ref 12–16)
IMM GRANULOCYTES # BLD AUTO: 0.04 K/UL (ref 0–0.04)
IMM GRANULOCYTES NFR BLD AUTO: 0.5 % (ref 0–0.5)
LYMPHOCYTES # BLD: 2.03 K/UL (ref 0.9–3.6)
LYMPHOCYTES NFR BLD: 23.6 % (ref 21–52)
MCH RBC QN AUTO: 29.6 PG (ref 24–34)
MCHC RBC AUTO-ENTMCNC: 33.2 G/DL (ref 31–37)
MCV RBC AUTO: 89.3 FL (ref 78–100)
MONOCYTES # BLD: 0.84 K/UL (ref 0.05–1.2)
MONOCYTES NFR BLD: 9.8 % (ref 3–10)
NEUTS SEG # BLD: 5.53 K/UL (ref 1.8–8)
NEUTS SEG NFR BLD: 64.2 % (ref 40–73)
NRBC # BLD: 0 K/UL (ref 0–0.01)
NRBC BLD-RTO: 0 PER 100 WBC
PLATELET # BLD AUTO: 138 K/UL (ref 135–420)
PMV BLD AUTO: 11.3 FL (ref 9.2–11.8)
POTASSIUM SERPL-SCNC: 3.8 MMOL/L (ref 3.5–5.5)
PROT SERPL-MCNC: 6.7 G/DL (ref 6.4–8.2)
RBC # BLD AUTO: 4.22 M/UL (ref 4.2–5.3)
SODIUM SERPL-SCNC: 136 MMOL/L (ref 136–145)
TSH SERPL DL<=0.05 MIU/L-ACNC: 2.5 UIU/ML (ref 0.36–3.74)
VIT B12 SERPL-MCNC: 807 PG/ML (ref 211–911)
WBC # BLD AUTO: 8.6 K/UL (ref 4.6–13.2)

## 2025-03-19 PROCEDURE — 83036 HEMOGLOBIN GLYCOSYLATED A1C: CPT

## 2025-03-19 PROCEDURE — 84443 ASSAY THYROID STIM HORMONE: CPT

## 2025-03-19 PROCEDURE — 82306 VITAMIN D 25 HYDROXY: CPT

## 2025-03-19 PROCEDURE — 85025 COMPLETE CBC W/AUTO DIFF WBC: CPT

## 2025-03-19 PROCEDURE — 80053 COMPREHEN METABOLIC PANEL: CPT

## 2025-03-19 PROCEDURE — 82607 VITAMIN B-12: CPT

## 2025-03-19 PROCEDURE — 36415 COLL VENOUS BLD VENIPUNCTURE: CPT

## 2025-04-01 ENCOUNTER — HOSPITAL ENCOUNTER (OUTPATIENT)
Age: 89
Setting detail: SPECIMEN
Discharge: HOME | End: 2025-04-04
Payer: MEDICARE

## 2025-04-01 LAB
APPEARANCE UR: ABNORMAL
BACTERIA URNS QL MICRO: ABNORMAL /HPF
BILIRUB UR QL: NEGATIVE
COLOR UR: YELLOW
EPITH CASTS URNS QL MICRO: ABNORMAL /LPF (ref 0–20)
GLUCOSE UR STRIP.AUTO-MCNC: NEGATIVE MG/DL
HGB UR QL STRIP: ABNORMAL
KETONES UR QL STRIP.AUTO: NEGATIVE MG/DL
LEUKOCYTE ESTERASE UR QL STRIP.AUTO: ABNORMAL
NITRITE UR QL STRIP.AUTO: NEGATIVE
PH UR STRIP: 7.5 (ref 5–8)
PROT UR STRIP-MCNC: ABNORMAL MG/DL
RBC #/AREA URNS HPF: ABNORMAL /HPF (ref 0–2)
SP GR UR REFRACTOMETRY: 1.01 (ref 1–1.03)
UROBILINOGEN UR QL STRIP.AUTO: 0.2 EU/DL (ref 0.2–1)
WBC URNS QL MICRO: >100 /HPF (ref 0–4)

## 2025-04-01 PROCEDURE — 87088 URINE BACTERIA CULTURE: CPT

## 2025-04-01 PROCEDURE — 87186 SC STD MICRODIL/AGAR DIL: CPT

## 2025-04-01 PROCEDURE — 87086 URINE CULTURE/COLONY COUNT: CPT

## 2025-04-01 PROCEDURE — 81001 URINALYSIS AUTO W/SCOPE: CPT

## 2025-04-03 LAB
BACTERIA SPEC CULT: ABNORMAL
COLONY COUNT, CNT: ABNORMAL
Lab: ABNORMAL

## 2025-04-07 ENCOUNTER — HOSPITAL ENCOUNTER (OUTPATIENT)
Age: 89
Discharge: HOME | End: 2025-04-10
Payer: MEDICARE

## 2025-04-07 DIAGNOSIS — R60.9 EDEMA, UNSPECIFIED TYPE: ICD-10-CM

## 2025-04-07 PROCEDURE — 73560 X-RAY EXAM OF KNEE 1 OR 2: CPT

## 2025-04-30 ENCOUNTER — HOSPITAL ENCOUNTER (OUTPATIENT)
Age: 89
Discharge: HOME | End: 2025-05-03
Payer: MEDICARE

## 2025-04-30 ENCOUNTER — TRANSCRIBE ORDERS (OUTPATIENT)
Age: 89
End: 2025-04-30

## 2025-04-30 DIAGNOSIS — I48.91 ATRIAL FIBRILLATION, UNSPECIFIED TYPE (HCC): ICD-10-CM

## 2025-04-30 DIAGNOSIS — I48.91 ATRIAL FIBRILLATION, UNSPECIFIED TYPE (HCC): Primary | ICD-10-CM

## 2025-04-30 LAB
EKG DIAGNOSIS: NORMAL
EKG Q-T INTERVAL: 428 MS
EKG QRS DURATION: 94 MS
EKG QTC CALCULATION (BAZETT): 394 MS
EKG R AXIS: -24 DEGREES
EKG T AXIS: 86 DEGREES
EKG VENTRICULAR RATE: 51 BPM

## 2025-04-30 PROCEDURE — 93005 ELECTROCARDIOGRAM TRACING: CPT

## (undated) DEVICE — COVER LT HNDL BLU PLAS

## (undated) DEVICE — SPONGE GZ W4XL4IN COT RADPQ HIGHLY ABSRB

## (undated) DEVICE — SUTURE STRATAFIX SPRL SZ 4-0 L12IN ABSRB UD FS-2 L19MM 3/8 SXMP2B409

## (undated) DEVICE — INTENDED FOR TISSUE SEPARATION, AND OTHER PROCEDURES THAT REQUIRE A SHARP SURGICAL BLADE TO PUNCTURE OR CUT.: Brand: BARD-PARKER ® CARBON RIB-BACK BLADES

## (undated) DEVICE — INTENT TO BE USED WITH SUTURE MATERIAL FOR TISSUE CLOSURE: Brand: RICHARD-ALLAN® NEEDLE 1/2 CIRCLE TAPER

## (undated) DEVICE — 3M™ STERI-DRAPE™ U-DRAPE 1015: Brand: STERI-DRAPE™

## (undated) DEVICE — BASIC SINGLE BASIN-LF: Brand: MEDLINE INDUSTRIES, INC.

## (undated) DEVICE — GOWN,SURGICAL,AURORA,SLEEVE: Brand: MEDLINE

## (undated) DEVICE — GLOVE SURG SZ 8 L12IN FNGR THK79MIL GRN LTX FREE

## (undated) DEVICE — DRAPE,ORTHOMAX ,HIP,W/POUCHES: Brand: MEDLINE

## (undated) DEVICE — SOLUTION IRRIG 3000ML 0.9% SOD CHL USP UROMATIC PLAS CONT

## (undated) DEVICE — APPLICATOR MEDICATED 10.5 CC SOLUTION HI LT ORNG CHLORAPREP

## (undated) DEVICE — DRILL, AO SMALL: Brand: GAMMA

## (undated) DEVICE — DRESSING FOAM POST OPERATIVE 4X10 IN MEPILEX BORDER AG

## (undated) DEVICE — SUTURE ETHBND EXCEL SZ 2 L30IN NONABSORBABLE GRN L40MM V-37 MX69G

## (undated) DEVICE — SYRINGE, LUER LOCK, 10ML: Brand: MEDLINE

## (undated) DEVICE — 3M™ TEGADERM™ TRANSPARENT FILM DRESSING FRAME STYLE, 1629, 8 IN X 12 IN (20 CM X 30 CM), 10/CT 8CT/CASE: Brand: 3M™ TEGADERM™

## (undated) DEVICE — DRAPE EQUIP C ARM 74X42 IN MOB XR W/ TIE RUBBER BND LF

## (undated) DEVICE — SYRINGE MED 30ML STD CLR PLAS LUERLOCK TIP N CTRL DISP

## (undated) DEVICE — ULNAR NERVE PROTECTOR FOAM POSITIONER: Brand: CARDINAL HEALTH

## (undated) DEVICE — HOOD, PEEL-AWAY: Brand: FLYTE

## (undated) DEVICE — K-WIRE

## (undated) DEVICE — BLADE ES L6IN ELASTOMERIC COAT EXT DURABLE BEND UPTO 90DEG

## (undated) DEVICE — Device: Brand: JELCO

## (undated) DEVICE — DRAPE,ISOLATION,INCISE,INVISISHIELD: Brand: MEDLINE

## (undated) DEVICE — SUTURE FIBERWIRE SZ 2 W/ TAPERED NEEDLE BLUE L38IN NONABSORB BLU L26.5MM 1/2 CIRCLE AR7200

## (undated) DEVICE — BOWL BNE CEM MIX SPAT CURET SMARTMIX CTS

## (undated) DEVICE — GLOVE ORTHO 8   MSG9480

## (undated) DEVICE — DRAPE, HEAVY DUTY, STERILE. FOR A 6' BIG CASE BACK TABLE MODEL 429: Brand: OR SPECIFIC

## (undated) DEVICE — LUER-LOK 360°: Brand: CONNECTA, LUER-LOK

## (undated) DEVICE — SUTURE VCRL + SZ 2-0 L27IN ABSRB UD CP-1 1/2 CIR REV CUT VCP266H

## (undated) DEVICE — SOLUTION IRRIG 1000ML STRL H2O USP PLAS POUR BTL

## (undated) DEVICE — INTENDED FOR TISSUE SEPARATION, AND OTHER PROCEDURES THAT REQUIRE A SHARP SURGICAL BLADE TO PUNCTURE OR CUT.: Brand: BARD-PARKER SAFETY BLADES SIZE 15, STERILE

## (undated) DEVICE — SOUTHSIDE TURNOVER: Brand: MEDLINE INDUSTRIES, INC.

## (undated) DEVICE — LAMINECTOMY ARM CRADLE FOAM POSITIONER: Brand: CARDINAL HEALTH

## (undated) DEVICE — BLADE ELECTRODE: Brand: EDGE

## (undated) DEVICE — APPLICATOR MEDICATED 26 CC SOLUTION HI LT ORNG CHLORAPREP

## (undated) DEVICE — 2108 SERIES SAGITTAL BLADE FLARED, GROUND  (19.0 X 1.37 X 90.0MM)

## (undated) DEVICE — DRAPE,U/ SHT,SPLIT,PLAS,STERIL: Brand: MEDLINE

## (undated) DEVICE — BLADE,STAINLESS-STEEL,10,STRL,DISPOSABLE: Brand: MEDLINE

## (undated) DEVICE — SUT VCRL + 1 27IN OS6 VIO --

## (undated) DEVICE — SPONGE LAP SFT 18X18 IN X RAY DETECTABLE

## (undated) DEVICE — DRESSING HYDROFIBER AQUACEL AG ADVANTAGE 3.5X10 IN

## (undated) DEVICE — GARMENT,MEDLINE,DVT,INT,CALF,MED, GEN2: Brand: MEDLINE

## (undated) DEVICE — GLOVE ORANGE PI 7 1/2   MSG9075

## (undated) DEVICE — ADHESIVE SKIN CLOSURE WND 8.661X1.5 IN 22 CM LIQUIBAND SECUR

## (undated) DEVICE — TOWEL,OR,DSP,ST,BLUE,DLX,6/PK,12PK/CS: Brand: MEDLINE

## (undated) DEVICE — DRAPE,HIP,W/POUCHES,STERILE: Brand: MEDLINE

## (undated) DEVICE — TAPE,CLOTH/SILK,CURAD,3"X10YD,LF,40/CS: Brand: CURAD

## (undated) DEVICE — MAJOR ORTHO PROCEDURE PACK: Brand: MEDLINE INDUSTRIES, INC.

## (undated) DEVICE — HYPODERMIC SAFETY NEEDLE: Brand: MONOJECT

## (undated) DEVICE — HANDPIECE SET WITH HIGH FLOW TIP AND SUCTION TUBE: Brand: INTERPULSE

## (undated) DEVICE — KIT BNE CEM PREP FEM QUIK-USE 1 PER CA

## (undated) DEVICE — SYRINGE MED 50ML LUERSLIP TIP

## (undated) DEVICE — SPONGE GZ 4X4 IN 16-PLY DETECTABLE W/ DMT MSTR TAG

## (undated) DEVICE — 2108 SERIES SAGITTAL BLADE AGGRESSIVE  (18.5 X 1.24 X 83.5MM)

## (undated) DEVICE — TOWEL SURG W17XL27IN STD BLU COT NONFENESTRATED PREWASHED

## (undated) DEVICE — GUIDE WIRE, BALL-TIPPED, STERILE

## (undated) DEVICE — PILLOW ABDUCTN MED 23X15X6 IN HIP SFT CONTACT CLOSURE

## (undated) DEVICE — SOLUTION IRRIG 1000ML 0.9% SOD CHL USP POUR PLAS BTL

## (undated) DEVICE — CONTAINER,SPECIMEN,PNEU TUBE,4OZ,OR STRL: Brand: MEDLINE

## (undated) DEVICE — INTENDED FOR TISSUE SEPARATION, AND OTHER PROCEDURES THAT REQUIRE A SHARP SURGICAL BLADE TO PUNCTURE OR CUT.: Brand: BARD-PARKER ®  SAFETY SCALPED

## (undated) DEVICE — BIPOLAR SEALER 23-301-1 AQM MBS: Brand: AQUAMANTYS™

## (undated) DEVICE — 3M™ IOBAN™ 2 ANTIMICROBIAL INCISE DRAPE 6650EZ: Brand: IOBAN™ 2

## (undated) DEVICE — MARKER,SKIN,WI/RULER AND LABELS: Brand: MEDLINE

## (undated) DEVICE — 450 ML BOTTLE OF 0.05% CHLORHEXIDINE GLUCONATE IN 99.95% STERILE WATER FOR IRRIGATION, USP AND APPLICATOR.: Brand: IRRISEPT ANTIMICROBIAL WOUND LAVAGE

## (undated) DEVICE — SUTURE VCRL + SZ 2-0 L36IN ABSRB UD L36MM CT-1 1/2 CIR VCP945H

## (undated) DEVICE — NEEDLE SPNL 22GA L3.5IN BLK WHTACR PNCL PNT HI FLO DISP

## (undated) DEVICE — SUTURE VCRL + SZ 1 L36IN ABSRB UD L36MM CT-1 1/2 CIR VCP947H